# Patient Record
Sex: FEMALE | Race: WHITE | NOT HISPANIC OR LATINO | Employment: UNEMPLOYED | URBAN - METROPOLITAN AREA
[De-identification: names, ages, dates, MRNs, and addresses within clinical notes are randomized per-mention and may not be internally consistent; named-entity substitution may affect disease eponyms.]

---

## 2022-08-21 ENCOUNTER — ANESTHESIA EVENT (INPATIENT)
Dept: PERIOP | Facility: HOSPITAL | Age: 62
End: 2022-08-21
Payer: COMMERCIAL

## 2022-08-21 ENCOUNTER — APPOINTMENT (OUTPATIENT)
Dept: RADIOLOGY | Facility: HOSPITAL | Age: 62
End: 2022-08-21
Payer: COMMERCIAL

## 2022-08-21 ENCOUNTER — ANESTHESIA (INPATIENT)
Dept: PERIOP | Facility: HOSPITAL | Age: 62
End: 2022-08-21
Payer: COMMERCIAL

## 2022-08-21 ENCOUNTER — APPOINTMENT (EMERGENCY)
Dept: CT IMAGING | Facility: HOSPITAL | Age: 62
End: 2022-08-21
Payer: COMMERCIAL

## 2022-08-21 ENCOUNTER — HOSPITAL ENCOUNTER (INPATIENT)
Facility: HOSPITAL | Age: 62
LOS: 9 days | Discharge: HOME HEALTH | End: 2022-08-30
Attending: SURGERY | Admitting: SURGERY
Payer: COMMERCIAL

## 2022-08-21 ENCOUNTER — HOSPITAL ENCOUNTER (EMERGENCY)
Facility: HOSPITAL | Age: 62
End: 2022-08-21
Attending: EMERGENCY MEDICINE
Payer: COMMERCIAL

## 2022-08-21 VITALS
RESPIRATION RATE: 17 BRPM | OXYGEN SATURATION: 96 % | SYSTOLIC BLOOD PRESSURE: 107 MMHG | WEIGHT: 116.84 LBS | BODY MASS INDEX: 22.06 KG/M2 | HEIGHT: 61 IN | HEART RATE: 66 BPM | DIASTOLIC BLOOD PRESSURE: 56 MMHG | TEMPERATURE: 97.9 F

## 2022-08-21 DIAGNOSIS — S12.200A C3 CERVICAL FRACTURE (HCC): ICD-10-CM

## 2022-08-21 DIAGNOSIS — S02.2XXA NASAL BONE FRACTURE: ICD-10-CM

## 2022-08-21 DIAGNOSIS — J43.9 PULMONARY EMPHYSEMA, UNSPECIFIED EMPHYSEMA TYPE (HCC): ICD-10-CM

## 2022-08-21 DIAGNOSIS — S02.2XXA NASAL BONE FRACTURES: ICD-10-CM

## 2022-08-21 DIAGNOSIS — D64.9 ANEMIA: ICD-10-CM

## 2022-08-21 DIAGNOSIS — S12.100A C2 CERVICAL FRACTURE (HCC): Primary | ICD-10-CM

## 2022-08-21 DIAGNOSIS — S12.291D OTHER CLOSED NONDISPLACED FRACTURE OF THIRD CERVICAL VERTEBRA WITH ROUTINE HEALING, SUBSEQUENT ENCOUNTER: ICD-10-CM

## 2022-08-21 DIAGNOSIS — S12.101A CLOSED NONDISPLACED FRACTURE OF SECOND CERVICAL VERTEBRA, UNSPECIFIED FRACTURE MORPHOLOGY, INITIAL ENCOUNTER (HCC): Primary | ICD-10-CM

## 2022-08-21 DIAGNOSIS — S01.01XA SCALP LACERATION, INITIAL ENCOUNTER: ICD-10-CM

## 2022-08-21 DIAGNOSIS — S09.90XA INJURY OF HEAD, INITIAL ENCOUNTER: ICD-10-CM

## 2022-08-21 PROBLEM — G20 PARKINSON DISEASE (HCC): Status: ACTIVE | Noted: 2022-01-01

## 2022-08-21 PROBLEM — I82.409 DVT (DEEP VENOUS THROMBOSIS) (HCC): Status: ACTIVE | Noted: 2022-01-01

## 2022-08-21 PROBLEM — S15.102A: Status: ACTIVE | Noted: 2022-08-21
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
## 2022-08-21 LAB
ABO GROUP BLD: NORMAL
ABO GROUP BLD: NORMAL
ALBUMIN SERPL BCP-MCNC: 2.4 G/DL (ref 3.5–5)
ALP SERPL-CCNC: 108 U/L (ref 46–116)
ALT SERPL W P-5'-P-CCNC: 15 U/L (ref 12–78)
ANION GAP SERPL CALCULATED.3IONS-SCNC: 5 MMOL/L (ref 4–13)
APTT PPP: 33 SECONDS (ref 23–37)
AST SERPL W P-5'-P-CCNC: 51 U/L (ref 5–45)
BASOPHILS # BLD AUTO: 0.05 THOUSANDS/ΜL (ref 0–0.1)
BASOPHILS NFR BLD AUTO: 1 % (ref 0–1)
BILIRUB SERPL-MCNC: 0.56 MG/DL (ref 0.2–1)
BLD GP AB SCN SERPL QL: NEGATIVE
BUN SERPL-MCNC: 20 MG/DL (ref 5–25)
CA-I BLD-SCNC: 1.14 MMOL/L (ref 1.12–1.32)
CALCIUM ALBUM COR SERPL-MCNC: 8.7 MG/DL (ref 8.3–10.1)
CALCIUM SERPL-MCNC: 7.4 MG/DL (ref 8.3–10.1)
CHLORIDE SERPL-SCNC: 105 MMOL/L (ref 96–108)
CO2 SERPL-SCNC: 29 MMOL/L (ref 21–32)
CREAT SERPL-MCNC: 0.84 MG/DL (ref 0.6–1.3)
EOSINOPHIL # BLD AUTO: 0.13 THOUSAND/ΜL (ref 0–0.61)
EOSINOPHIL NFR BLD AUTO: 1 % (ref 0–6)
ERYTHROCYTE [DISTWIDTH] IN BLOOD BY AUTOMATED COUNT: 21.2 % (ref 11.6–15.1)
GFR SERPL CREATININE-BSD FRML MDRD: 74 ML/MIN/1.73SQ M
GLUCOSE SERPL-MCNC: 121 MG/DL (ref 65–140)
HCT VFR BLD AUTO: 24.2 % (ref 34.8–46.1)
HCT VFR BLD AUTO: 25.5 % (ref 34.8–46.1)
HCT VFR BLD AUTO: 28.8 % (ref 34.8–46.1)
HGB BLD-MCNC: 7.1 G/DL (ref 11.5–15.4)
HGB BLD-MCNC: 7.7 G/DL (ref 11.5–15.4)
HGB BLD-MCNC: 8.8 G/DL (ref 11.5–15.4)
IMM GRANULOCYTES # BLD AUTO: 0.02 THOUSAND/UL (ref 0–0.2)
IMM GRANULOCYTES NFR BLD AUTO: 0 % (ref 0–2)
INR PPP: 1.36 (ref 0.84–1.19)
LYMPHOCYTES # BLD AUTO: 2.14 THOUSANDS/ΜL (ref 0.6–4.47)
LYMPHOCYTES NFR BLD AUTO: 23 % (ref 14–44)
MAGNESIUM SERPL-MCNC: 1.7 MG/DL (ref 1.6–2.6)
MCH RBC QN AUTO: 28 PG (ref 26.8–34.3)
MCHC RBC AUTO-ENTMCNC: 30.2 G/DL (ref 31.4–37.4)
MCV RBC AUTO: 93 FL (ref 82–98)
MONOCYTES # BLD AUTO: 0.9 THOUSAND/ΜL (ref 0.17–1.22)
MONOCYTES NFR BLD AUTO: 10 % (ref 4–12)
NEUTROPHILS # BLD AUTO: 6.12 THOUSANDS/ΜL (ref 1.85–7.62)
NEUTS SEG NFR BLD AUTO: 65 % (ref 43–75)
NRBC BLD AUTO-RTO: 0 /100 WBCS
PHOSPHATE SERPL-MCNC: 4.2 MG/DL (ref 2.3–4.1)
PLATELET # BLD AUTO: 169 THOUSANDS/UL (ref 149–390)
PMV BLD AUTO: 11.5 FL (ref 8.9–12.7)
POTASSIUM SERPL-SCNC: 4.4 MMOL/L (ref 3.5–5.3)
PROT SERPL-MCNC: 5.2 G/DL (ref 6.4–8.4)
PROTHROMBIN TIME: 16.9 SECONDS (ref 11.6–14.5)
RBC # BLD AUTO: 2.75 MILLION/UL (ref 3.81–5.12)
RH BLD: NEGATIVE
RH BLD: NEGATIVE
SODIUM SERPL-SCNC: 139 MMOL/L (ref 135–147)
SPECIMEN EXPIRATION DATE: NORMAL
WBC # BLD AUTO: 9.36 THOUSAND/UL (ref 4.31–10.16)

## 2022-08-21 PROCEDURE — 99253 IP/OBS CNSLTJ NEW/EST LOW 45: CPT | Performed by: NEUROLOGICAL SURGERY

## 2022-08-21 PROCEDURE — 85014 HEMATOCRIT: CPT | Performed by: PHYSICIAN ASSISTANT

## 2022-08-21 PROCEDURE — 85025 COMPLETE CBC W/AUTO DIFF WBC: CPT | Performed by: EMERGENCY MEDICINE

## 2022-08-21 PROCEDURE — 99223 1ST HOSP IP/OBS HIGH 75: CPT | Performed by: EMERGENCY MEDICINE

## 2022-08-21 PROCEDURE — 85610 PROTHROMBIN TIME: CPT | Performed by: EMERGENCY MEDICINE

## 2022-08-21 PROCEDURE — 86920 COMPATIBILITY TEST SPIN: CPT

## 2022-08-21 PROCEDURE — 90471 IMMUNIZATION ADMIN: CPT

## 2022-08-21 PROCEDURE — 99291 CRITICAL CARE FIRST HOUR: CPT | Performed by: EMERGENCY MEDICINE

## 2022-08-21 PROCEDURE — 86850 RBC ANTIBODY SCREEN: CPT | Performed by: STUDENT IN AN ORGANIZED HEALTH CARE EDUCATION/TRAINING PROGRAM

## 2022-08-21 PROCEDURE — 96375 TX/PRO/DX INJ NEW DRUG ADDON: CPT

## 2022-08-21 PROCEDURE — 85018 HEMOGLOBIN: CPT | Performed by: PHYSICIAN ASSISTANT

## 2022-08-21 PROCEDURE — 99222 1ST HOSP IP/OBS MODERATE 55: CPT | Performed by: SURGERY

## 2022-08-21 PROCEDURE — 30233N1 TRANSFUSION OF NONAUTOLOGOUS RED BLOOD CELLS INTO PERIPHERAL VEIN, PERCUTANEOUS APPROACH: ICD-10-PCS | Performed by: SURGERY

## 2022-08-21 PROCEDURE — 93005 ELECTROCARDIOGRAM TRACING: CPT

## 2022-08-21 PROCEDURE — 94668 MNPJ CHEST WALL SBSQ: CPT

## 2022-08-21 PROCEDURE — 99285 EMERGENCY DEPT VISIT HI MDM: CPT

## 2022-08-21 PROCEDURE — 94664 DEMO&/EVAL PT USE INHALER: CPT

## 2022-08-21 PROCEDURE — P9040 RBC LEUKOREDUCED IRRADIATED: HCPCS

## 2022-08-21 PROCEDURE — NC001 PR NO CHARGE: Performed by: PHYSICIAN ASSISTANT

## 2022-08-21 PROCEDURE — 71045 X-RAY EXAM CHEST 1 VIEW: CPT

## 2022-08-21 PROCEDURE — 70450 CT HEAD/BRAIN W/O DYE: CPT

## 2022-08-21 PROCEDURE — 90715 TDAP VACCINE 7 YRS/> IM: CPT | Performed by: EMERGENCY MEDICINE

## 2022-08-21 PROCEDURE — 86900 BLOOD TYPING SEROLOGIC ABO: CPT | Performed by: STUDENT IN AN ORGANIZED HEALTH CARE EDUCATION/TRAINING PROGRAM

## 2022-08-21 PROCEDURE — G1004 CDSM NDSC: HCPCS

## 2022-08-21 PROCEDURE — 94760 N-INVAS EAR/PLS OXIMETRY 1: CPT

## 2022-08-21 PROCEDURE — 36415 COLL VENOUS BLD VENIPUNCTURE: CPT | Performed by: STUDENT IN AN ORGANIZED HEALTH CARE EDUCATION/TRAINING PROGRAM

## 2022-08-21 PROCEDURE — 86901 BLOOD TYPING SEROLOGIC RH(D): CPT | Performed by: STUDENT IN AN ORGANIZED HEALTH CARE EDUCATION/TRAINING PROGRAM

## 2022-08-21 PROCEDURE — 72125 CT NECK SPINE W/O DYE: CPT

## 2022-08-21 PROCEDURE — 96376 TX/PRO/DX INJ SAME DRUG ADON: CPT

## 2022-08-21 PROCEDURE — 85730 THROMBOPLASTIN TIME PARTIAL: CPT | Performed by: EMERGENCY MEDICINE

## 2022-08-21 PROCEDURE — 80053 COMPREHEN METABOLIC PANEL: CPT | Performed by: EMERGENCY MEDICINE

## 2022-08-21 PROCEDURE — 83735 ASSAY OF MAGNESIUM: CPT | Performed by: STUDENT IN AN ORGANIZED HEALTH CARE EDUCATION/TRAINING PROGRAM

## 2022-08-21 PROCEDURE — 84100 ASSAY OF PHOSPHORUS: CPT | Performed by: STUDENT IN AN ORGANIZED HEALTH CARE EDUCATION/TRAINING PROGRAM

## 2022-08-21 PROCEDURE — 70486 CT MAXILLOFACIAL W/O DYE: CPT

## 2022-08-21 PROCEDURE — 94640 AIRWAY INHALATION TREATMENT: CPT

## 2022-08-21 PROCEDURE — 82330 ASSAY OF CALCIUM: CPT | Performed by: STUDENT IN AN ORGANIZED HEALTH CARE EDUCATION/TRAINING PROGRAM

## 2022-08-21 PROCEDURE — 96374 THER/PROPH/DIAG INJ IV PUSH: CPT

## 2022-08-21 PROCEDURE — 70498 CT ANGIOGRAPHY NECK: CPT

## 2022-08-21 PROCEDURE — 72040 X-RAY EXAM NECK SPINE 2-3 VW: CPT

## 2022-08-21 PROCEDURE — 12004 RPR S/N/AX/GEN/TRK7.6-12.5CM: CPT | Performed by: EMERGENCY MEDICINE

## 2022-08-21 RX ORDER — NICOTINE 21 MG/24HR
14 PATCH, TRANSDERMAL 24 HOURS TRANSDERMAL DAILY
Status: DISCONTINUED | OUTPATIENT
Start: 2022-08-21 | End: 2022-08-30 | Stop reason: HOSPADM

## 2022-08-21 RX ORDER — FENTANYL CITRATE 50 UG/ML
50 INJECTION, SOLUTION INTRAMUSCULAR; INTRAVENOUS ONCE
Status: COMPLETED | OUTPATIENT
Start: 2022-08-21 | End: 2022-08-21

## 2022-08-21 RX ORDER — ACETYLCYSTEINE 200 MG/ML
3 SOLUTION ORAL; RESPIRATORY (INHALATION)
Status: DISCONTINUED | OUTPATIENT
Start: 2022-08-21 | End: 2022-08-23

## 2022-08-21 RX ORDER — ENTACAPONE 200 MG/1
200 TABLET ORAL 4 TIMES DAILY
Status: DISCONTINUED | OUTPATIENT
Start: 2022-08-21 | End: 2022-08-21 | Stop reason: SDUPTHER

## 2022-08-21 RX ORDER — AMOXICILLIN 250 MG
2 CAPSULE ORAL 2 TIMES DAILY
Status: DISCONTINUED | OUTPATIENT
Start: 2022-08-21 | End: 2022-08-30 | Stop reason: HOSPADM

## 2022-08-21 RX ORDER — MORPHINE SULFATE 4 MG/ML
4 INJECTION, SOLUTION INTRAMUSCULAR; INTRAVENOUS ONCE
Status: COMPLETED | OUTPATIENT
Start: 2022-08-21 | End: 2022-08-21

## 2022-08-21 RX ORDER — OXYCODONE HYDROCHLORIDE 5 MG/1
5 TABLET ORAL EVERY 4 HOURS PRN
Status: DISCONTINUED | OUTPATIENT
Start: 2022-08-21 | End: 2022-08-30 | Stop reason: HOSPADM

## 2022-08-21 RX ORDER — PAROXETINE HYDROCHLORIDE 20 MG/1
20 TABLET, FILM COATED ORAL DAILY
Status: DISCONTINUED | OUTPATIENT
Start: 2022-08-21 | End: 2022-08-30 | Stop reason: HOSPADM

## 2022-08-21 RX ORDER — SODIUM CHLORIDE, SODIUM GLUCONATE, SODIUM ACETATE, POTASSIUM CHLORIDE, MAGNESIUM CHLORIDE, SODIUM PHOSPHATE, DIBASIC, AND POTASSIUM PHOSPHATE .53; .5; .37; .037; .03; .012; .00082 G/100ML; G/100ML; G/100ML; G/100ML; G/100ML; G/100ML; G/100ML
500 INJECTION, SOLUTION INTRAVENOUS ONCE
Status: COMPLETED | OUTPATIENT
Start: 2022-08-21 | End: 2022-08-21

## 2022-08-21 RX ORDER — PAROXETINE HYDROCHLORIDE 20 MG/1
20 TABLET, FILM COATED ORAL DAILY
COMMUNITY

## 2022-08-21 RX ORDER — LEVALBUTEROL 1.25 MG/.5ML
1.25 SOLUTION, CONCENTRATE RESPIRATORY (INHALATION)
Status: DISCONTINUED | OUTPATIENT
Start: 2022-08-21 | End: 2022-08-23

## 2022-08-21 RX ORDER — CARBIDOPA, LEVODOPA AND ENTACAPONE 50; 200; 200 MG/1; MG/1; MG/1
1 TABLET, FILM COATED ORAL 4 TIMES DAILY
COMMUNITY

## 2022-08-21 RX ORDER — ACETAMINOPHEN 325 MG/1
975 TABLET ORAL EVERY 8 HOURS SCHEDULED
Status: DISCONTINUED | OUTPATIENT
Start: 2022-08-21 | End: 2022-08-30 | Stop reason: HOSPADM

## 2022-08-21 RX ORDER — ASPIRIN 81 MG/1
81 TABLET ORAL DAILY
Status: DISCONTINUED | OUTPATIENT
Start: 2022-08-21 | End: 2022-08-30 | Stop reason: HOSPADM

## 2022-08-21 RX ORDER — HEPARIN SODIUM 5000 [USP'U]/ML
5000 INJECTION, SOLUTION INTRAVENOUS; SUBCUTANEOUS EVERY 8 HOURS SCHEDULED
Status: DISCONTINUED | OUTPATIENT
Start: 2022-08-21 | End: 2022-08-22

## 2022-08-21 RX ORDER — ENTACAPONE 200 MG/1
200 TABLET ORAL 4 TIMES DAILY
Status: DISCONTINUED | OUTPATIENT
Start: 2022-08-21 | End: 2022-08-30 | Stop reason: HOSPADM

## 2022-08-21 RX ORDER — ALBUMIN, HUMAN INJ 5% 5 %
25 SOLUTION INTRAVENOUS ONCE
Status: COMPLETED | OUTPATIENT
Start: 2022-08-21 | End: 2022-08-21

## 2022-08-21 RX ORDER — ENOXAPARIN SODIUM 100 MG/ML
30 INJECTION SUBCUTANEOUS EVERY 12 HOURS
Status: DISCONTINUED | OUTPATIENT
Start: 2022-08-21 | End: 2022-08-21

## 2022-08-21 RX ORDER — LIDOCAINE 50 MG/G
1 PATCH TOPICAL DAILY
Status: DISCONTINUED | OUTPATIENT
Start: 2022-08-21 | End: 2022-08-30 | Stop reason: HOSPADM

## 2022-08-21 RX ORDER — CHLORHEXIDINE GLUCONATE 0.12 MG/ML
15 RINSE ORAL EVERY 12 HOURS SCHEDULED
Status: DISCONTINUED | OUTPATIENT
Start: 2022-08-21 | End: 2022-08-21

## 2022-08-21 RX ORDER — CARBIDOPA AND LEVODOPA 25; 100 MG/1; MG/1
2 TABLET, EXTENDED RELEASE ORAL 4 TIMES DAILY
Status: DISCONTINUED | OUTPATIENT
Start: 2022-08-21 | End: 2022-08-21 | Stop reason: SDUPTHER

## 2022-08-21 RX ORDER — PROPRANOLOL HYDROCHLORIDE 80 MG/1
160 CAPSULE, EXTENDED RELEASE ORAL DAILY
Status: DISCONTINUED | OUTPATIENT
Start: 2022-08-22 | End: 2022-08-30 | Stop reason: HOSPADM

## 2022-08-21 RX ORDER — GABAPENTIN 300 MG/1
300 CAPSULE ORAL 3 TIMES DAILY
COMMUNITY

## 2022-08-21 RX ORDER — MAGNESIUM SULFATE HEPTAHYDRATE 40 MG/ML
2 INJECTION, SOLUTION INTRAVENOUS ONCE
Status: COMPLETED | OUTPATIENT
Start: 2022-08-21 | End: 2022-08-21

## 2022-08-21 RX ORDER — GABAPENTIN 300 MG/1
300 CAPSULE ORAL 3 TIMES DAILY
Status: DISCONTINUED | OUTPATIENT
Start: 2022-08-21 | End: 2022-08-30 | Stop reason: HOSPADM

## 2022-08-21 RX ORDER — ACETYLCYSTEINE 200 MG/ML
3 SOLUTION ORAL; RESPIRATORY (INHALATION)
Status: DISCONTINUED | OUTPATIENT
Start: 2022-08-21 | End: 2022-08-21

## 2022-08-21 RX ORDER — ASPIRIN 81 MG/1
81 TABLET, CHEWABLE ORAL DAILY
COMMUNITY

## 2022-08-21 RX ORDER — HYDROMORPHONE HCL IN WATER/PF 6 MG/30 ML
0.2 PATIENT CONTROLLED ANALGESIA SYRINGE INTRAVENOUS EVERY 4 HOURS PRN
Status: DISCONTINUED | OUTPATIENT
Start: 2022-08-21 | End: 2022-08-26

## 2022-08-21 RX ORDER — OXYCODONE HYDROCHLORIDE 5 MG/1
2.5 TABLET ORAL EVERY 4 HOURS PRN
Status: DISCONTINUED | OUTPATIENT
Start: 2022-08-21 | End: 2022-08-30 | Stop reason: HOSPADM

## 2022-08-21 RX ADMIN — MORPHINE SULFATE 4 MG: 4 INJECTION INTRAVENOUS at 08:39

## 2022-08-21 RX ADMIN — GABAPENTIN 300 MG: 300 CAPSULE ORAL at 14:47

## 2022-08-21 RX ADMIN — TETANUS TOXOID, REDUCED DIPHTHERIA TOXOID AND ACELLULAR PERTUSSIS VACCINE, ADSORBED 0.5 ML: 5; 2.5; 8; 8; 2.5 SUSPENSION INTRAMUSCULAR at 03:22

## 2022-08-21 RX ADMIN — CARBIDOPA AND LEVODOPA 2 TABLET: 25; 100 TABLET ORAL at 18:21

## 2022-08-21 RX ADMIN — MORPHINE SULFATE 4 MG: 4 INJECTION INTRAVENOUS at 06:41

## 2022-08-21 RX ADMIN — ACETYLCYSTEINE 600 MG: 200 SOLUTION ORAL; RESPIRATORY (INHALATION) at 19:42

## 2022-08-21 RX ADMIN — IOHEXOL 64 ML: 350 INJECTION, SOLUTION INTRAVENOUS at 05:52

## 2022-08-21 RX ADMIN — FENTANYL CITRATE 50 MCG: 50 INJECTION INTRAMUSCULAR; INTRAVENOUS at 06:04

## 2022-08-21 RX ADMIN — ACETAMINOPHEN 975 MG: 325 TABLET ORAL at 13:58

## 2022-08-21 RX ADMIN — HEPARIN SODIUM 5000 UNITS: 5000 INJECTION INTRAVENOUS; SUBCUTANEOUS at 14:47

## 2022-08-21 RX ADMIN — HEPARIN SODIUM 5000 UNITS: 5000 INJECTION INTRAVENOUS; SUBCUTANEOUS at 21:53

## 2022-08-21 RX ADMIN — PAROXETINE HYDROCHLORIDE 20 MG: 20 TABLET, FILM COATED ORAL at 14:05

## 2022-08-21 RX ADMIN — ENTACAPONE 200 MG: 200 TABLET, FILM COATED ORAL at 21:55

## 2022-08-21 RX ADMIN — ENTACAPONE 200 MG: 200 TABLET, FILM COATED ORAL at 18:21

## 2022-08-21 RX ADMIN — NICOTINE 14 MG: 14 PATCH, EXTENDED RELEASE TRANSDERMAL at 13:58

## 2022-08-21 RX ADMIN — AMPICILLIN SODIUM AND SULBACTAM SODIUM 3 G: 2; 1 INJECTION, POWDER, FOR SOLUTION INTRAMUSCULAR; INTRAVENOUS at 14:58

## 2022-08-21 RX ADMIN — GABAPENTIN 300 MG: 300 CAPSULE ORAL at 21:53

## 2022-08-21 RX ADMIN — LIDOCAINE 5% 1 PATCH: 700 PATCH TOPICAL at 13:58

## 2022-08-21 RX ADMIN — ASPIRIN 81 MG: 81 TABLET, COATED ORAL at 14:47

## 2022-08-21 RX ADMIN — CARBIDOPA AND LEVODOPA 2 TABLET: 25; 100 TABLET ORAL at 21:55

## 2022-08-21 RX ADMIN — SENNOSIDES AND DOCUSATE SODIUM 2 TABLET: 50; 8.6 TABLET ORAL at 18:21

## 2022-08-21 RX ADMIN — ALBUMIN (HUMAN) 12.5 G: 12.5 INJECTION, SOLUTION INTRAVENOUS at 21:05

## 2022-08-21 RX ADMIN — CARBIDOPA AND LEVODOPA 2 TABLET: 25; 100 TABLET ORAL at 14:06

## 2022-08-21 RX ADMIN — ENTACAPONE 200 MG: 200 TABLET, FILM COATED ORAL at 14:06

## 2022-08-21 RX ADMIN — MAGNESIUM SULFATE HEPTAHYDRATE 2 G: 40 INJECTION, SOLUTION INTRAVENOUS at 18:18

## 2022-08-21 RX ADMIN — LEVALBUTEROL HYDROCHLORIDE 1.25 MG: 1.25 SOLUTION, CONCENTRATE RESPIRATORY (INHALATION) at 19:42

## 2022-08-21 RX ADMIN — AMPICILLIN SODIUM AND SULBACTAM SODIUM 3 G: 2; 1 INJECTION, POWDER, FOR SOLUTION INTRAMUSCULAR; INTRAVENOUS at 20:30

## 2022-08-21 RX ADMIN — SODIUM CHLORIDE, SODIUM GLUCONATE, SODIUM ACETATE, POTASSIUM CHLORIDE, MAGNESIUM CHLORIDE, SODIUM PHOSPHATE, DIBASIC, AND POTASSIUM PHOSPHATE 500 ML: .53; .5; .37; .037; .03; .012; .00082 INJECTION, SOLUTION INTRAVENOUS at 16:43

## 2022-08-21 RX ADMIN — FENTANYL CITRATE 50 MCG: 50 INJECTION INTRAMUSCULAR; INTRAVENOUS at 03:21

## 2022-08-21 RX ADMIN — ACETAMINOPHEN 975 MG: 325 TABLET ORAL at 21:53

## 2022-08-21 NOTE — ASSESSMENT & PLAN NOTE
8/21 CTA neck: No vascular enhancement of left vertebral artery in the neck  However, in this patient with no acute/persistent neurologic symptoms, the appearance is most suggestive of anatomic variation rather than acute traumatic left vertebral artery injury, specifically as there is no enhancement even of left vertebral artery origin  There is enhancement of left vertebral artery and left posterior cerebellar artery above the level of foramen magnum presumably via collateral circulation across vertebral basilar junction  There are patent posterior communicating arteries bilaterally and the basilar artery is somewhat diminutive consistent with anatomic variation       Neurosurgery consulted  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

## 2022-08-21 NOTE — H&P
H&P - Trauma   Dyan Lujan 57 y o  female MRN: [id number]  Unit/Bed#: ICU 13 Encounter: [id number]    Trauma Alert: Other transfer   Model of Arrival: Ambulance    Trauma Team: Attending Dr Mihir Beasley  Consultants:     Neurosurgery:   - STAT consult; returned call/text yes   ;     Assessment/Plan   Active Problems / Assessment:   C2,C3 fracture  Scalp laceration      Plan:   Admit SD 1   -neurosurgery consulted, Q1 hour neuro checks    History of Present Illness     Chief Complaint: fall  Mechanism:Fall     HPI:    Dyan Lujan is a 57 y o  female on eliquis who presented to [facility]  ED after experiencing and mechanical fall from standing height and striking her head on the shower  Pt was in the bathroom when she fell  She incurred a large scalp laceration that was stapled in the ED  She also was found to have C2 and C3 fractures and no neuro deficits  CTA showed vertebral artery fillin deficit vs anatomical variation  She was transferred to [facility] were she will be sent to [facility]  Review of Systems   Constitutional: Negative for diaphoresis and fever  HENT: Negative for dental problem, facial swelling, nosebleeds and rhinorrhea  Eyes: Negative for photophobia  Respiratory: Negative for cough, chest tightness and wheezing  Cardiovascular: Negative for chest pain  Gastrointestinal: Negative for anal bleeding, constipation and vomiting  Endocrine: Negative for polydipsia and polyuria  Genitourinary: Negative for dysuria, frequency, genital sores, hematuria and urgency  Musculoskeletal: Positive for neck pain  Negative for back pain and myalgias  Skin: Positive for wound  Neurological: Positive for tremors  Negative for syncope and headaches  Psychiatric/Behavioral: Negative for decreased concentration and hallucinations  All other systems reviewed and are negative  12-point, complete review of systems was reviewed and negative except as stated above       Historical Information     Past Medical History:   Diagnosis Date    Basedow's disease     Disease of thyroid gland     DVT (deep venous thrombosis) (HCC)     MGUS (monoclonal gammopathy of unknown significance)     Pulmonary emphysema (HCC)      No past surgical history on file  Immunization History   Administered Date(s) Administered    Tdap 08/21/2022     Last Tetanus: 8/21/2022  Family History: Non-contributory     Meds/Allergies   all current active meds have been reviewed   No Known Allergies    Objective   Initial Vitals:   Temperature: 97 5 °F (36 4 °C) (08/21/22 1000)  Pulse: 65 (08/21/22 1000)  Respirations: 22 (08/21/22 1000)  Blood Pressure: 100/53 (08/21/22 1000)    Primary Survey:   Airway:        Status: patent;        Pre-hospital Interventions: none        Hospital Interventions: none  Breathing:        Pre-hospital Interventions: none              Right breath sounds: normal       Left breath sounds: normal  Circulation:        Rhythm: regular       Rate: regular   Right Pulses Left Pulses    R radial: 2+  R femoral: 2+  R pedal: 2+     L radial: 2+  L femoral: 2+  L pedal: 2+       Disability:        GCS: Eye: 4; Verbal: 5 Motor: 6 Total: 15       Right Pupil: round;  reactive         Left Pupil:  round;  reactive      R Motor Strength L Motor Strength    R : 5/5  R dorsiflex: 5/5  R plantarflex: 5/5 L : 5/5  L dorsiflex: 5/5  L plantarflex: 5/5        Sensory:  No sensory deficit  Exposure:           Secondary Survey:  Physical Exam  Vitals reviewed  Constitutional:       General: She is not in acute distress  Appearance: She is not ill-appearing  HENT:      Head: Normocephalic  Comments: 12 cm stapled scalp laceration     Right Ear: External ear normal       Left Ear: External ear normal       Nose: Nose normal       Mouth/Throat:      Mouth: Mucous membranes are moist       Pharynx: No oropharyngeal exudate or posterior oropharyngeal erythema     Eyes:      General: Right eye: No discharge  Left eye: No discharge  Extraocular Movements: Extraocular movements intact  Pupils: Pupils are equal, round, and reactive to light  Neck:      Comments: c-collar  Cardiovascular:      Rate and Rhythm: Normal rate and regular rhythm  Pulses: Normal pulses  Pulmonary:      Effort: Pulmonary effort is normal       Breath sounds: Normal breath sounds  Abdominal:      General: Abdomen is flat  There is no distension  Tenderness: There is no abdominal tenderness  Musculoskeletal:         General: No swelling, tenderness or deformity  Normal range of motion  Skin:     General: Skin is warm  Neurological:      General: No focal deficit present  Mental Status: She is alert and oriented to person, place, and time  Cranial Nerves: No cranial nerve deficit  Comments: Tremor baseline   Psychiatric:         Mood and Affect: Mood normal          Invasive Devices  Report    Peripheral Intravenous Line  Duration           Peripheral IV 08/21/22 Left Antecubital <1 day    Peripheral IV 08/21/22 Left;Proximal;Ventral (anterior) Forearm <1 day              Lab Results:   BMP/CMP:   Lab Results   Component Value Date    SODIUM 139 08/21/2022    K 4 4 08/21/2022     08/21/2022    CO2 29 08/21/2022    BUN 20 08/21/2022    CREATININE 0 84 08/21/2022    CALCIUM 7 4 (L) 08/21/2022    AST 51 (H) 08/21/2022    ALT 15 08/21/2022    ALKPHOS 108 08/21/2022    EGFR 74 08/21/2022       Imaging Results: I have personally reviewed pertinent reports      Chest Xray(s): pending   FAST exam(s): negative for acute findings   CT Scan(s): positive for acute findings: see results   Additional Xray(s): N/A     Other Studies:     Code Status: Level 1 - Full Code  Advance Directive and Living Will:      Power of :    POLST:    I have spent 30 minutes with Patient  today in which greater than 50% of this time was spent in counseling/coordination of care regarding Impressions

## 2022-08-21 NOTE — ED PROVIDER NOTES
Emergency Department Trauma Note  Romario Silva 57 y o  female MRN: [id number]  Unit/Bed#: ED 07/ED 07 Encounter: [id number]      Trauma Alert: Trauma Acuity: Trauma Evaluation  Model of Arrival: Mode of Arrival: ALS via    Trauma Team: Current Providers  Attending Provider: Tiki Paul MD  Attending Provider: Kit Bower DO  Registered Nurse: Anton Ordaz RN  Registered Nurse: Oscar Chin RN  Consultants:     None      History of Present Illness     Chief Complaint:   Chief Complaint   Patient presents with   Meng Shack twice at home in the bathroom from standing position, struck head on a tablet and shower, no LOC, denies neck or back pain  Laceration to the top of the head and abrasion to the nose  No blood thinners  Bleeding controlled on arrival       HPI:  Romario Silva is a 57 y o  female who presents with fall in the bathroom, head injury, facial injury on Eliquis  Mechanism:Details of Incident: 2 falls at home from standing position, struck head on a tablet and shower          HPI  Review of Systems   Constitutional: Negative for chills, fatigue and fever  HENT: Negative for ear discharge, ear pain, rhinorrhea and sore throat  Eyes: Negative for pain and visual disturbance  Respiratory: Negative for cough and shortness of breath  Cardiovascular: Negative for chest pain and palpitations  Gastrointestinal: Negative for abdominal pain, diarrhea, nausea and vomiting  Endocrine: Negative for polydipsia, polyphagia and polyuria  Genitourinary: Negative for difficulty urinating, dysuria, flank pain and hematuria  Musculoskeletal: Negative for arthralgias and back pain  Skin: Positive for wound  Negative for color change and rash  Allergic/Immunologic: Negative for immunocompromised state  Neurological: Positive for headaches  Negative for dizziness, seizures, syncope and weakness  Psychiatric/Behavioral: Negative for confusion and self-injury   The patient is not nervous/anxious  All other systems reviewed and are negative  Historical Information     Immunizations:   Immunization History   Administered Date(s) Administered    Tdap 08/21/2022       Past Medical History:   Diagnosis Date    Basedow's disease     Disease of thyroid gland     DVT (deep venous thrombosis) (HCC)     MGUS (monoclonal gammopathy of unknown significance)     Pulmonary emphysema (HCC)      No family history on file  No past surgical history on file  Social History     Tobacco Use    Smoking status: Current Every Day Smoker     Packs/day: 1 00     Years: 45 00     Pack years: 45 00     Types: Cigarettes    Smokeless tobacco: Never Used   Substance Use Topics    Alcohol use: Never    Drug use: Never     E-Cigarette/Vaping     E-Cigarette/Vaping Substances       Family History: non-contributory    Meds/Allergies   Prior to Admission Medications   Prescriptions Last Dose Informant Patient Reported? Taking?    PARoxetine (PAXIL) 20 mg tablet   Yes Yes   Sig: Take 20 mg by mouth daily   apixaban (ELIQUIS) 5 mg   Yes Yes   Sig: Take 5 mg by mouth 2 (two) times a day   aspirin 81 mg chewable tablet   Yes Yes   Sig: Chew 81 mg daily   carbidopa-levodopa-entacapone (STALEVO) -200 MG per tablet   Yes Yes   Sig: Take 1 tablet by mouth 4 (four) times a day   gabapentin (NEURONTIN) 300 mg capsule   Yes Yes   Sig: Take 300 mg by mouth 3 (three) times a day   propranolol (INNOPRAN XL) 120 MG 24 hr capsule   Yes Yes   Sig: Take 160 mg by mouth daily at bedtime      Facility-Administered Medications: None       No Known Allergies    PHYSICAL EXAM    PE limited by:  Not limited    Objective   Vitals:   First set: Temperature: 97 9 °F (36 6 °C) (08/21/22 0310)  Pulse: 71 (08/21/22 0310)  Respirations: (!) 24 (08/21/22 0310)  Blood Pressure: 117/82 (08/21/22 0310)  SpO2: 95 % (2L NC) (08/21/22 0310)    Primary Survey:   (A) Airway:  Intact  (B) Breathing:  Clear to auscultation bilaterally  (C) Circulation: Pulses:   radial  4/4  (D) Disabliity:  GCS Total:  15  (E) Expose:  Completed    Secondary Survey: (Click on Physical Exam tab above)  Physical Exam  Vitals and nursing note reviewed  Constitutional:       General: She is not in acute distress  Appearance: Normal appearance  She is not ill-appearing, toxic-appearing or diaphoretic  HENT:      Head: Normocephalic  Comments: 11 cm laceration to the mid posterior frontal scalp     Right Ear: Tympanic membrane normal       Left Ear: Tympanic membrane normal       Nose: No congestion or rhinorrhea  Comments: Nasal swelling, ecchymosis to the nasal bridge, minor abrasions to the nasal bridge, no gross deformity     Mouth/Throat:      Mouth: Mucous membranes are moist       Pharynx: Oropharynx is clear  No oropharyngeal exudate or posterior oropharyngeal erythema  Eyes:      General:         Right eye: No discharge  Left eye: No discharge  Extraocular Movements: Extraocular movements intact  Conjunctiva/sclera: Conjunctivae normal       Pupils: Pupils are equal, round, and reactive to light  Cardiovascular:      Rate and Rhythm: Normal rate and regular rhythm  Pulses: Normal pulses  Heart sounds: Normal heart sounds  No murmur heard  No gallop  Pulmonary:      Effort: Pulmonary effort is normal  No respiratory distress  Breath sounds: No stridor  Wheezing present  No rhonchi or rales  Comments: Diffuse bilateral wheezes  Chest:      Chest wall: No tenderness  Abdominal:      General: Bowel sounds are normal  There is no distension  Palpations: Abdomen is soft  There is no mass  Tenderness: There is no abdominal tenderness  There is no right CVA tenderness, left CVA tenderness, guarding or rebound  Hernia: No hernia is present  Musculoskeletal:         General: Normal range of motion  Cervical back: Normal range of motion and neck supple     Skin: General: Skin is warm and dry  Capillary Refill: Capillary refill takes less than 2 seconds  Neurological:      General: No focal deficit present  Mental Status: She is alert and oriented to person, place, and time  Cranial Nerves: No cranial nerve deficit  Sensory: No sensory deficit  Motor: No weakness  Coordination: Coordination normal       Gait: Gait normal       Deep Tendon Reflexes: Reflexes normal       Comments: Intention tremor   Psychiatric:         Mood and Affect: Mood normal          Behavior: Behavior normal          Thought Content: Thought content normal          Judgment: Judgment normal          Cervical spine cleared by clinical criteria?  Yes     Invasive Devices  Report    None                 Lab Results:   Results Reviewed     Procedure Component Value Units Date/Time    Protime-INR [[id number]]  (Abnormal) Collected: 08/21/22 0316    Lab Status: Final result Specimen: Blood from Line, Venous Updated: 08/21/22 0346     Protime 16 9 seconds      INR 1 36    APTT [[id number]]  (Normal) Collected: 08/21/22 0316    Lab Status: Final result Specimen: Blood from Line, Venous Updated: 08/21/22 0346     PTT 33 seconds     Comprehensive metabolic panel [[id number]]  (Abnormal) Collected: 08/21/22 0316    Lab Status: Final result Specimen: Blood from Line, Venous Updated: 08/21/22 0343     Sodium 139 mmol/L      Potassium 4 4 mmol/L      Chloride 105 mmol/L      CO2 29 mmol/L      ANION GAP 5 mmol/L      BUN 20 mg/dL      Creatinine 0 84 mg/dL      Glucose 121 mg/dL      Calcium 7 4 mg/dL      Corrected Calcium 8 7 mg/dL      AST 51 U/L      ALT 15 U/L      Alkaline Phosphatase 108 U/L      Total Protein 5 2 g/dL      Albumin 2 4 g/dL      Total Bilirubin 0 56 mg/dL      eGFR 74 ml/min/1 73sq m     Narrative:      [place] guidelines for Chronic Kidney Disease (CKD):     Stage 1 with normal or high GFR (GFR > 90 mL/min/1 73 square meters)    Stage 2 Mild CKD (GFR = 60-89 mL/min/1 73 square meters)    Stage 3A Moderate CKD (GFR = 45-59 mL/min/1 73 square meters)    Stage 3B Moderate CKD (GFR = 30-44 mL/min/1 73 square meters)    Stage 4 Severe CKD (GFR = 15-29 mL/min/1 73 square meters)    Stage 5 End Stage CKD (GFR <15 mL/min/1 73 square meters)  Note: GFR calculation is accurate only with a steady state creatinine    CBC and differential [[id number]]  (Abnormal) Collected: 08/21/22 0316    Lab Status: Final result Specimen: Blood from Line, Venous Updated: 08/21/22 0321     WBC 9 36 Thousand/uL      RBC 2 75 Million/uL      Hemoglobin 7 7 g/dL      Hematocrit 25 5 %      MCV 93 fL      MCH 28 0 pg      MCHC 30 2 g/dL      RDW 21 2 %      MPV 11 5 fL      Platelets 142 Thousands/uL      nRBC 0 /100 WBCs      Neutrophils Relative 65 %      Immat GRANS % 0 %      Lymphocytes Relative 23 %      Monocytes Relative 10 %      Eosinophils Relative 1 %      Basophils Relative 1 %      Neutrophils Absolute 6 12 Thousands/µL      Immature Grans Absolute 0 02 Thousand/uL      Lymphocytes Absolute 2 14 Thousands/µL      Monocytes Absolute 0 90 Thousand/µL      Eosinophils Absolute 0 13 Thousand/µL      Basophils Absolute 0 05 Thousands/µL                  Imaging Studies:   Direct to CT: Yes  CTA neck with and without contrast   Final Result by Faustino Honeycutt MD (08/21 0731)      No vascular enhancement of left vertebral artery in the neck  However, in this patient with no acute/persistent neurologic symptoms, the appearance is most suggestive of anatomic variation rather than acute traumatic left vertebral artery injury,    specifically as there is no enhancement even of left vertebral artery origin  There is enhancement of left vertebral artery and left posterior cerebellar artery above the level of foramen magnum presumably via collateral circulation across vertebral    basilar junction    There are patent posterior communicating arteries bilaterally and the basilar artery is somewhat diminutive consistent with anatomic variation  Continued neurologic monitoring is recommended to exclude the development of neurologic    symptoms  Atherosclerotic changes, more advanced stability expected for the patient's age but without flow-limiting atherosclerotic stenosis  Emphysematous changes in the visualized upper lung zones  Reidentification of cervical spine degenerative changes and no cervical spine fractures without malalignment  I personally discussed this study with Santiago Leal on 8/21/2022 at 6:10 AM                   Workstation performed: [id number]         CT facial bones wo contrast   Final Result by Leela Pang DO (08/21 5992)      Large posterior vertex scalp hematoma/laceration, no calvarial fracture or acute intracranial abnormality is seen  Other findings as above  CT CERVICAL SPINE - WITHOUT CONTRAST      INDICATION:   Head trauma, moderate-severe   Fall, head injury, face injury, on Eliquis  COMPARISON:  None  TECHNIQUE:  CT examination of the cervical spine was performed without intravenous contrast   Contiguous axial images were obtained  Sagittal and coronal reconstructions were performed  Radiation dose length product (DLP) for this visit:  76 310 744 mGy-cm  (accession [id number]), 314 mGy-cm  (accession [id number]), 326 mGy-cm  (accession [id number])  This examination, like all CT scans performed in the [facility], was performed    utilizing techniques to minimize radiation dose exposure, including the use of iterative reconstruction and automated exposure control  IMAGE QUALITY:  Diagnostic  FINDINGS:      ALIGNMENT:  Normal alignment of the cervical spine  No subluxation  VERTEBRAL BODIES:  Oblique fracture of the anteroinferior aspect of C2 (sagittal image 57, series 604)  Oblique fracture of the left transverse process of C3 (axial image 62, series 5)  Visualized bones otherwise appear intact  Anterior plate and    screw fusion of C4-C7  Hardware appears intact  DEGENERATIVE CHANGES:  Intervertebral disc space narrowing at C2/C3, C3/C4, and C7/T1 with anterior, marginal, and uncovertebral osteophytosis at these levels  Multilevel facet joint arthropathy  PREVERTEBRAL AND PARASPINAL SOFT TISSUES:  Grossly unremarkable      THORACIC INLET:  Old fracture of the left clavicle  Moderate to severe pulmonary emphysematous changes  Mild atherosclerosis  IMPRESSION:      Oblique fracture of the anteroinferior aspect of C2 (sagittal image 57, series 604)  Oblique fracture of the left transverse process of C3 (axial image 62, series 5)  Recommend CTA of the neck to exclude vascular injury on the left  Spinal alignment appears maintained  Degenerative changes as described  Old fracture of the left clavicle, pulmonary emphysematous changes, and other findings as above  CT FACIAL BONES WITHOUT INTRAVENOUS CONTRAST      INDICATION:   Head trauma, moderate-severe   Fall, head injury, face injury, on Eliquis  COMPARISON: None  TECHNIQUE:  Axial CT images were obtained through the facial bones with additional sagittal and coronal reconstructions  Radiation dose length product (DLP) for this visit:  76 310 744 mGy-cm  (accession [id number]), 314 mGy-cm  (accession [id number]), 326 mGy-cm  (accession [id number])  This examination, like all CT scans performed in the [facility], was performed    utilizing techniques to minimize radiation dose exposure, including the use of iterative reconstruction and automated exposure control  IMAGE QUALITY:  Diagnostic  FINDINGS:       FACIAL BONES:  Nasal bone fracture  Normal alignment of the temporomandibular joints  No suspicious appearing osseous lesion        ORBITS:  Orbital globes, optic nerves, and extraocular muscles appear symmetric and normal  There is no evidence of retrobulbar mass, abscess, or hematoma  SINUSES:  Opacification of several bilateral ethmoid air cells; otherwise grossly unremarkable  SOFT TISSUES:  Perinasal soft tissue swelling and a small amount of subcutaneous emphysema  IMPRESSION:      Nasal bone fracture with associated perinasal soft tissue swelling  The orbits appear intact  Other findings as above  I personally discussed this study with Kehinde Friends on 8/21/2022 at 4:32 AM                Workstation performed: [id number]         CT spine cervical without contrast   Final Result by Yovnai Curry DO (08/21 9737)      Large posterior vertex scalp hematoma/laceration, no calvarial fracture or acute intracranial abnormality is seen  Other findings as above  CT CERVICAL SPINE - WITHOUT CONTRAST      INDICATION:   Head trauma, moderate-severe   Fall, head injury, face injury, on Eliquis  COMPARISON:  None  TECHNIQUE:  CT examination of the cervical spine was performed without intravenous contrast   Contiguous axial images were obtained  Sagittal and coronal reconstructions were performed  Radiation dose length product (DLP) for this visit:  76 310 744 mGy-cm  (accession [id number]), 314 mGy-cm  (accession [id number]), 326 mGy-cm  (accession [id number])  This examination, like all CT scans performed in the [facility], was performed    utilizing techniques to minimize radiation dose exposure, including the use of iterative reconstruction and automated exposure control  IMAGE QUALITY:  Diagnostic  FINDINGS:      ALIGNMENT:  Normal alignment of the cervical spine  No subluxation  VERTEBRAL BODIES:  Oblique fracture of the anteroinferior aspect of C2 (sagittal image 57, series 604)  Oblique fracture of the left transverse process of C3 (axial image 62, series 5)  Visualized bones otherwise appear intact    Anterior plate and screw fusion of C4-C7  Hardware appears intact  DEGENERATIVE CHANGES:  Intervertebral disc space narrowing at C2/C3, C3/C4, and C7/T1 with anterior, marginal, and uncovertebral osteophytosis at these levels  Multilevel facet joint arthropathy  PREVERTEBRAL AND PARASPINAL SOFT TISSUES:  Grossly unremarkable      THORACIC INLET:  Old fracture of the left clavicle  Moderate to severe pulmonary emphysematous changes  Mild atherosclerosis  IMPRESSION:      Oblique fracture of the anteroinferior aspect of C2 (sagittal image 57, series 604)  Oblique fracture of the left transverse process of C3 (axial image 62, series 5)  Recommend CTA of the neck to exclude vascular injury on the left  Spinal alignment appears maintained  Degenerative changes as described  Old fracture of the left clavicle, pulmonary emphysematous changes, and other findings as above  CT FACIAL BONES WITHOUT INTRAVENOUS CONTRAST      INDICATION:   Head trauma, moderate-severe   Fall, head injury, face injury, on Eliquis  COMPARISON: None  TECHNIQUE:  Axial CT images were obtained through the facial bones with additional sagittal and coronal reconstructions  Radiation dose length product (DLP) for this visit:  76 310 744 mGy-cm  (accession [id number]), 314 mGy-cm  (accession [id number]), 326 mGy-cm  (accession [id number])  This examination, like all CT scans performed in the [facility], was performed    utilizing techniques to minimize radiation dose exposure, including the use of iterative reconstruction and automated exposure control  IMAGE QUALITY:  Diagnostic  FINDINGS:       FACIAL BONES:  Nasal bone fracture  Normal alignment of the temporomandibular joints  No suspicious appearing osseous lesion  ORBITS:  Orbital globes, optic nerves, and extraocular muscles appear symmetric and normal  There is no evidence of retrobulbar mass, abscess, or hematoma  SINUSES:  Opacification of several bilateral ethmoid air cells; otherwise grossly unremarkable  SOFT TISSUES:  Perinasal soft tissue swelling and a small amount of subcutaneous emphysema  IMPRESSION:      Nasal bone fracture with associated perinasal soft tissue swelling  The orbits appear intact  Other findings as above  I personally discussed this study with Brooke Hernandes on 8/21/2022 at 4:32 AM                Workstation performed: [id number]         CT head without contrast   Final Result by Saima Munguia DO (08/21 7421)      Large posterior vertex scalp hematoma/laceration, no calvarial fracture or acute intracranial abnormality is seen  Other findings as above  CT CERVICAL SPINE - WITHOUT CONTRAST      INDICATION:   Head trauma, moderate-severe   Fall, head injury, face injury, on Eliquis  COMPARISON:  None  TECHNIQUE:  CT examination of the cervical spine was performed without intravenous contrast   Contiguous axial images were obtained  Sagittal and coronal reconstructions were performed  Radiation dose length product (DLP) for this visit:  76 310 744 mGy-cm  (accession [id number]), 314 mGy-cm  (accession [id number]), 326 mGy-cm  (accession [id number])  This examination, like all CT scans performed in the [facility], was performed    utilizing techniques to minimize radiation dose exposure, including the use of iterative reconstruction and automated exposure control  IMAGE QUALITY:  Diagnostic  FINDINGS:      ALIGNMENT:  Normal alignment of the cervical spine  No subluxation  VERTEBRAL BODIES:  Oblique fracture of the anteroinferior aspect of C2 (sagittal image 57, series 604)  Oblique fracture of the left transverse process of C3 (axial image 62, series 5)  Visualized bones otherwise appear intact  Anterior plate and    screw fusion of C4-C7  Hardware appears intact        DEGENERATIVE CHANGES:  Intervertebral disc space narrowing at C2/C3, C3/C4, and C7/T1 with anterior, marginal, and uncovertebral osteophytosis at these levels  Multilevel facet joint arthropathy  PREVERTEBRAL AND PARASPINAL SOFT TISSUES:  Grossly unremarkable      THORACIC INLET:  Old fracture of the left clavicle  Moderate to severe pulmonary emphysematous changes  Mild atherosclerosis  IMPRESSION:      Oblique fracture of the anteroinferior aspect of C2 (sagittal image 57, series 604)  Oblique fracture of the left transverse process of C3 (axial image 62, series 5)  Recommend CTA of the neck to exclude vascular injury on the left  Spinal alignment appears maintained  Degenerative changes as described  Old fracture of the left clavicle, pulmonary emphysematous changes, and other findings as above  CT FACIAL BONES WITHOUT INTRAVENOUS CONTRAST      INDICATION:   Head trauma, moderate-severe   Fall, head injury, face injury, on Eliquis  COMPARISON: None  TECHNIQUE:  Axial CT images were obtained through the facial bones with additional sagittal and coronal reconstructions  Radiation dose length product (DLP) for this visit:  76 310 744 mGy-cm  (accession [id number]), 314 mGy-cm  (accession [id number]), 326 mGy-cm  (accession [id number])  This examination, like all CT scans performed in the [facility], was performed    utilizing techniques to minimize radiation dose exposure, including the use of iterative reconstruction and automated exposure control  IMAGE QUALITY:  Diagnostic  FINDINGS:       FACIAL BONES:  Nasal bone fracture  Normal alignment of the temporomandibular joints  No suspicious appearing osseous lesion  ORBITS:  Orbital globes, optic nerves, and extraocular muscles appear symmetric and normal  There is no evidence of retrobulbar mass, abscess, or hematoma        SINUSES:  Opacification of several bilateral ethmoid air cells; otherwise grossly unremarkable  SOFT TISSUES:  Perinasal soft tissue swelling and a small amount of subcutaneous emphysema  IMPRESSION:      Nasal bone fracture with associated perinasal soft tissue swelling  The orbits appear intact  Other findings as above  I personally discussed this study with Juan Conklinmarkie on 8/21/2022 at 4:32 AM                Workstation performed: [id number]               Procedures  Laceration repair    Date/Time: 8/21/2022 4:50 AM  Performed by: Penny Mann MD  Authorized by: Penny Mann MD   Consent: Verbal consent obtained  Risks and benefits: risks, benefits and alternatives were discussed  Consent given by: patient and spouse  Patient identity confirmed: verbally with patient, arm band, provided demographic data and hospital-assigned identification number  Time out: Immediately prior to procedure a "time out" was called to verify the correct patient, procedure, equipment, support staff and site/side marked as required  Body area: head/neck  Location details: scalp  Laceration length: 11 cm  Tendon involvement: none  Nerve involvement: none  Vascular damage: no  Anesthesia: local infiltration    Anesthesia:  Local Anesthetic: lidocaine 1% with epinephrine  Anesthetic total: 8 mL    Sedation:  Patient sedated: no      Wound Dehiscence:  Superficial Wound Dehiscence: simple closure      Procedure Details:  Preparation: Patient was prepped and draped in the usual sterile fashion  Irrigation solution: saline  Irrigation method: syringe  Amount of cleaning: extensive  Debridement: none  Degree of undermining: none  Skin closure: staples  Number of sutures: 23 staples    Approximation: close  Approximation difficulty: simple  Patient tolerance: patient tolerated the procedure well with no immediate complications    CriticalCare Time  Performed by: Penny Mann MD  Authorized by: Penny Mann MD     Critical care provider statement:     Critical care time (minutes):  45    Critical care was necessary to treat or prevent imminent or life-threatening deterioration of the following conditions:  Trauma    Critical care was time spent personally by me on the following activities:  Interpretation of cardiac output measurements, ordering and performing treatments and interventions, ordering and review of laboratory studies, ordering and review of radiographic studies, re-evaluation of patient's condition, review of old charts, examination of patient, evaluation of patient's response to treatment, discussions with consultants, development of treatment plan with patient or surrogate and obtaining history from patient or surrogate    I assumed direction of critical care for this patient from another provider in my specialty: no               ED Course  ED Course as of 08/21/22 1915   Sun Aug 21, 2022   [id number]:  Patient appears well, vital signs reviewed  Trauma eval called  Head injury/facial injury on Eliquis  Normal neurological exam   Plan to complete CT head, CT cervical spine, CT maxillofacial   No chest trauma, no chest complaints, chest x-ray for gone  Full range of motion of both hips, no pelvic pain, x-rays of the pelvis for gone  I will give analgesics for her discomfort, update her tetanus status, and prepare for primary repair of scalp laceration  0528 102:  CTs discussed with radiologist   Patient noted to have cervical spine fractures  Patient still without neck pain  I will consult Trauma for admission  Plan to repair wound, see procedure note for full details  [id number]:  Discussed with Dr Colby Vazquez trauma team, accepts for admission  Requesting CTA neck    [id number]: CTA neck d/w radiologist, 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  Neuro exam unchanged             MDM        Disposition  [facility] Transfer: No  Final diagnoses:   Injury of head, initial encounter   Scalp laceration, initial encounter   Nasal bone fractures   Anemia   C2 cervical fracture ([facility])   C3 cervical fracture ([facility] [address] )     Time reflects when diagnosis was documented in both MDM as applicable and the Disposition within this note     Time User Action Codes Description Comment    8/21/2022  4:09 AM Sandrea Stage Add [S09 90XA] Injury of head, initial encounter     8/21/2022  4:09 AM Sandrea Stage Add [S01 01XA] Scalp laceration, initial encounter     8/21/2022  4:09 AM Sandrea Stage Add [S02  2XXA] Nasal bone fractures     8/21/2022  4:09 AM Qing Shahab [D64 9] Anemia     8/21/2022  4:33 AM Sandrea Stage Add [Y61 923A] C2 cervical fracture ([facility] [address] )     8/21/2022  4:33 AM Sandrea Stage Modify [S09 90XA] Injury of head, initial encounter     8/21/2022  4:33 AM Sandrea Stage Modify [D10 302B] C2 cervical fracture ([facility] [address] )     8/21/2022  4:33 AM Sandrea Stage Add [S12 200A] C3 cervical fracture [facility])       ED Disposition     ED Disposition   Transfer to Another Facility-In Network    Condition   --    Date/Time   Sun Aug 21, 2022  4:33 AM    Comment              MD Documentation    Flowsheet Row Most Recent Value   Patient Condition The patient has been stabilized such that within reasonable medical probability, no material deterioration of the patient condition or the condition of the unborn child(maryam) is likely to result from the transfer   Reason for Transfer Level of Care needed not available at this facility   Benefits of Transfer Specialized equipment and/or services available at the receiving facility (Include comment)________________________   Risks of Transfer Potential for delay in receiving treatment, Possible worsening of condition or death during transfer, Loss of IV, Potential deterioration of medical condition, Increased discomfort during transfer   Accepting Physician Dr Darline Paris Name, Antonio Ferrell MD   Provider Certification General risk, such as traffic hazards, adverse weather conditions, rough terrain or turbulence, possible failure of equipment (including vehicle or aircraft), or consequences of actions of persons outside the control of the transport personnel, Unanticipated needs of medical equipment and personnel during transport, Risk of worsening condition, The possibility of a transport vehicle being unavailable      RN Documentation    Flowsheet Row Most [facility] Name, [place]  [facility]      Follow-up Information    None       Discharge Medication List as of 8/21/2022  8:57 AM      CONTINUE these medications which have NOT CHANGED    Details   apixaban (ELIQUIS) 5 mg Take 5 mg by mouth 2 (two) times a day, Historical Med      aspirin 81 mg chewable tablet Chew 81 mg daily, Historical Med      carbidopa-levodopa-entacapone (STALEVO) -200 MG per tablet Take 1 tablet by mouth 4 (four) times a day, Historical Med      gabapentin (NEURONTIN) 300 mg capsule Take 300 mg by mouth 3 (three) times a day, Historical Med      PARoxetine (PAXIL) 20 mg tablet Take 20 mg by mouth daily, Historical Med      propranolol (INNOPRAN XL) 120 MG 24 hr capsule Take 160 mg by mouth daily at bedtime, Historical Med           No discharge procedures on file      PDMP Review     None          ED Provider  Electronically Signed by         Vladimir Mckeon MD  08/21/22 3129

## 2022-08-21 NOTE — EMTALA/ACUTE CARE TRANSFER
[facility]  [address]  [facility] [address]  Dept: [phone]      EMTALA TRANSFER CONSENT    NAME Seema Ambriz                                         1960                              MRN [id number]    I have been informed of my rights regarding examination, treatment, and transfer   by Dr Kay Reynolds MD    Benefits: Specialized equipment and/or services available at the receiving facility (Include comment)________________________    Risks: Potential for delay in receiving treatment, Possible worsening of condition or death during transfer, Loss of IV, Potential deterioration of medical condition, Increased discomfort during transfer      Consent for Transfer:  I acknowledge that my medical condition has been evaluated and explained to me by the emergency department physician or other qualified medical person and/or my attending physician, who has recommended that I be transferred to the service of  Accepting Physician: Dr Mary Valdivia at [facility] Name, [address] : [facility]  The above potential benefits of such transfer, the potential risks associated with such transfer, and the probable risks of not being transferred have been explained to me, and I fully understand them  The doctor has explained that, in my case, the benefits of transfer outweigh the risks  I agree to be transferred  I authorize the performance of emergency medical procedures and treatments upon me in both transit and upon arrival at the receiving facility  Additionally, I authorize the release of any and all medical records to the receiving facility and request they be transported with me, if possible  I understand that the safest mode of transportation during a medical emergency is an ambulance and that the Hospital advocates the use of this mode of transport   Risks of traveling to the receiving facility by car, including absence of medical control, life sustaining equipment, such as oxygen, and medical personnel has been explained to me and I fully understand them  (PREMA CORRECT BOX BELOW)  [  ]  I consent to the stated transfer and to be transported by ambulance/helicopter  [  ]  I consent to the stated transfer, but refuse transportation by ambulance and accept full responsibility for my transportation by car  I understand the risks of non-ambulance transfers and I exonerate the Hospital and its staff from any deterioration in my condition that results from this refusal     X___________________________________________    DATE  22  TIME________  Signature of patient or legally responsible individual signing on patient behalf           RELATIONSHIP TO PATIENT_________________________          Provider Certification    NAME Jason Costello                                         1960                              MRN [id number]    A medical screening exam was performed on the above named patient  Based on the examination:    Condition Necessitating Transfer The primary encounter diagnosis was C2 cervical fracture ([address] )  Diagnoses of Injury of head, initial encounter, Scalp laceration, initial encounter, Nasal bone fractures, Anemia, and C3 cervical fracture ([address] ) were also pertinent to this visit      Patient Condition: The patient has been stabilized such that within reasonable medical probability, no material deterioration of the patient condition or the condition of the unborn child(maryam) is likely to result from the transfer    Reason for Transfer: Level of Care needed not available at this facility    Transfer Requirements: Facility [facility]   · Space available and qualified personnel available for treatment as acknowledged by    · Agreed to accept transfer and to provide appropriate medical treatment as acknowledged by       Dr Anton Turner  · Appropriate medical records of the examination and treatment of the patient are provided at the time of transfer   STAFF INITIAL WHEN COMPLETED _______  · Transfer will be performed by qualified personnel from    and appropriate transfer equipment as required, including the use of necessary and appropriate life support measures  Provider Certification: I have examined the patient and explained the following risks and benefits of being transferred/refusing transfer to the patient/family:  General risk, such as traffic hazards, adverse weather conditions, rough terrain or turbulence, possible failure of equipment (including vehicle or aircraft), or consequences of actions of persons outside the control of the transport personnel, Unanticipated needs of medical equipment and personnel during transport, Risk of worsening condition, The possibility of a transport vehicle being unavailable      Based on these reasonable risks and benefits to the patient and/or the unborn child(maryam), and based upon the information available at the time of the patients examination, I certify that the medical benefits reasonably to be expected from the provision of appropriate medical treatments at another medical facility outweigh the increasing risks, if any, to the individuals medical condition, and in the case of labor to the unborn child, from effecting the transfer      X____________________________________________ DATE 08/21/22        TIME_______      ORIGINAL - SEND TO MEDICAL RECORDS   COPY - SEND WITH PATIENT DURING TRANSFER

## 2022-08-21 NOTE — CONSULTS
Patient Name: Seun Kelley YOB: 1960    Medical Record No : [id number]     Admit/Registration Date: 8/21/2022  9:49 AM  Date of Consult: 08/21/22      Oral and Maxillofacial Surgery Consult Note    Assessment:  57 y o  female with PMH of MGUS, parkinson disease, emphysema, DVT, scalp laceration, left vertebral artery occlusion who is s/p alleged fall sustained bilateral nasal bones and C2, C3 cervical fractures  The patient is in C-collar presently  Plan/Recs:  - OR with [facility] for closed reduction of bilateral nasal bone fractures in OR setting pending clearance from Neurosurgery  - NPO  - Sinus precautions  - Unasyn 3 gm q6h or Augmentin 875/125 mg BID x 5-7 days  - Pain meds as per primary team  - [email or web address] if permissible  - Call [facility] with any questions, thank you  -----------------------------------------    Chief Complaint:  Facial trauma secondary to fall    HPI:  57 y o  female with PMH of MGUS, parkinson disease, emphysema, DVT, scalp laceration, left vertebral artery occlusion who is s/p alleged fall sustained bilateral nasal bones and C2, C3 cervical fractures  The patient is in C-collar presently  Pre-admission records reviewed  PMH/PSH/Meds/Allergies Reviewed  Past Medical History:   Diagnosis Date    Basedow's disease     Disease of thyroid gland     DVT (deep venous thrombosis) (HCC)     MGUS (monoclonal gammopathy of unknown significance)     Pulmonary emphysema (HCC)      No past surgical history on file      No Known Allergies    Social History     Socioeconomic History    Marital status: Unknown     Spouse name: Not on file    Number of children: Not on file    Years of education: Not on file    Highest education level: Not on file   Occupational History    Not on file   Tobacco Use    Smoking status: Not on file    Smokeless tobacco: Not on file   Substance and Sexual Activity    Alcohol use: Not on file    Drug use: Not on file    Sexual activity: Not on file   Other Topics Concern    Not on file   Social History Narrative    Not on file     Social Determinants of Health     Financial Resource Strain: Not on file   Food Insecurity: Not on file   Transportation Needs: Not on file   Physical Activity: Not on file   Stress: Not on file   Social Connections: Not on file   Intimate Partner Violence: Not on file   Housing Stability: Not on file       Scheduled Medications  Current Facility-Administered Medications   Medication Dose Route Frequency Provider Last Rate    acetaminophen  975 mg Oral [facility] Chilo Marcos [place], DO      HYDROmorphone  0 2 mg Intravenous Q4H PRN Chilo Marcos Cerni, DO      lidocaine  1 patch Topical Daily Felisa Hughes MD      nicotine  14 mg Transdermal Daily Felisa Hughes MD      oxyCODONE  2 5 mg Oral Q4H PRN Chilo Marcos Cerni, DO      oxyCODONE  5 mg Oral Q4H PRN Chilo Marcos Cerni, DO      senna-docusate sodium  2 tablet Oral BID Felisa Hughes MD         PRN Medications    HYDROmorphone    oxyCODONE    oxyCODONE    Medication Infusions       Review of Systems    Vitals:    Temp:  [97 5 °F (36 4 °C)-97 9 °F (36 6 °C)] 97 5 °F (36 4 °C)  HR:  [58-83] 58  Resp:  [15-26] 15  BP: ()/(50-82) 92/51  Wt Readings from Last 1 Encounters:   08/21/22 53 kg (116 lb 13 5 oz)     Ht Readings from Last 1 Encounters:   08/21/22 5' 1" (1 549 m)     There is no height or weight on file to calculate BMI  Respiratory  Report   Lab Data (Last 4 hours)    None         O2/Vent Data (Last 4 hours)    None              Patient Lines/Drains/Airways Status     Active Airway     None              I/O:  Current Diet Order:        Diet Orders   (From admission, onward)             Start     Ordered    08/21/22 1114  Diet NPO  Diet effective now        References:    Nutrtion Support Algorithm Enteral vs  Parenteral   Question Answer Comment   Diet Type NPO    RD to adjust diet per protocol?  Yes        08/21/22 1115               No intake or output data in the 24 hours ending 08/21/22 1254    Labs:  Results from last 7 days   Lab Units 08/21/22  0316   WBC Thousand/uL 9 36   HEMOGLOBIN g/dL 7 7*   HEMATOCRIT % 25 5*   PLATELETS Thousands/uL 169     Results from last 7 days   Lab Units 08/21/22  0316   POTASSIUM mmol/L 4 4   CHLORIDE mmol/L 105   CO2 mmol/L 29   BUN mg/dL 20   CREATININE mg/dL 0 84   CALCIUM mg/dL 7 4*     Results from last 7 days   Lab Units 08/21/22  0316   INR  1 36*   PTT seconds 33         Pain Management Panel    There is no flowsheet data to display  Imaging:   CT: Maxillofacial without contrast reviewed : Bilateral nasal bone fractures  Physical Exam:   General: Integment: skin warm and dry, patient is WD/WN, Voice quality: normal, in NAD  Neuro Exam: AAOX3, CN V,VII grossly intact, GCS: 15  Head: Normocephalic, no scalp lacerations or hematoma  Face: No lacerations/abrasions   Ears: Pinna wnl bilaterally, no otorrhea, hearing grossly intact,    Eyes/Periorbital: Pupils equal, round, reactive to light and Extraocular movements intact, intercanthal distance wnl   Nose: Edema and ecchymosis noted with nasal dorsum, +nasal crepitus, no nasal septal hematoma, no rhinorrhea, no epistaxis, bilateral nares patent   Oral Exam:Lips and mucosal surfaces wnl, floor of mouth is soft with no palpable masses, tongue protrusion is midline and has full range of motion, no pharyngeal edema or exudate   Dentalalveolar Exam: edentulous maxilla and mandible     Lymph/Neck Exam: Neck is soft, trachea is midline, no gross cervical lymphadenopathy bilaterally     Tayler Zavala, JAMIE

## 2022-08-21 NOTE — ASSESSMENT & PLAN NOTE
8/21 CT cspine: anterioinferior C2 fx, L transverse process fx C3, old L clavicle fx  Neurosurgery consulted  Appreciate recommendations  Cervical spine precautions  Cervical collar  Holding DVT ppx and AC/AP pending NSG recommendations  Neurovascular exams Q1hr  No focal deficits on arrival to [facility]

## 2022-08-21 NOTE — ASSESSMENT & PLAN NOTE
On Eliquis for DVT   Holding in setting of trauma   Hgb 7 7, INR 1 36, PTT 33  Follow up NSG recommendations regarding resumption of anticoagulation

## 2022-08-21 NOTE — CONSULTS
[facility] 1960, 57 y o  female MRN: [id number]  Unit/Bed#: ICU 13 Encounter: [id number]  Primary Care Provider: No primary care provider on file  Date and time admitted to hospital: 8/21/2022  9:49 AM    Consults    MGUS (monoclonal gammopathy of unknown significance)  Assessment & Plan  Per Epic review, MGUS w/ h/o recurrent DVTs on Eliquis/ASA  Holding Eliquis and ASA pending NSG evaluation  Outpatient follow up    Pulmonary emphysema ([address] )  Assessment & Plan  [facility] [place] on 3L supplemental O2 sating 100%  Not on home O2  Wean O2 as able  Pulmonary toilet  F/u cxr r/o rib fractures - no chest wall tenderness  Q1hr I/S use  1PPD smoker  Nicotine patch    DVT (deep venous thrombosis) ([facility])  Assessment & Plan  On Eliquis for DVT   Holding in setting of trauma   Hgb 7 7, INR 1 36, PTT 33  Follow up NSG recommendations regarding resumption of anticoagulation    Fall  Assessment & Plan  8/21 mechanical fall d/t ambulatory dysfunction and Parkinson's disease  No home assistive devices for ambulation  PT OT consulted  Injuries as below    CTH negative for acute injuries  C cspine w/C2 fx, and L transverse process C3 fx  CT facial bones w/nasal bone fx  CTA neck w/nonenhancement of L vertebral artery  Chest xray pending  FAST pending    Scalp laceration, initial encounter  Assessment & Plan  11cm scalp laceration after fall  S/p staple repair at OSH 8/21  Staple removal 10-14 days  [facility] and monitoring  Outpatient follow up trauma clinic    Unspecified injury of left vertebral artery, initial encounter  Assessment & Plan  8/21 CTA neck: No vascular enhancement of left vertebral artery in the neck    However, in this patient with no acute/persistent neurologic symptoms, the appearance is most suggestive of anatomic variation rather than acute traumatic left vertebral artery injury, specifically as there is no enhancement even of left vertebral artery origin  There is enhancement of left vertebral artery and left posterior cerebellar artery above the level of foramen magnum presumably via collateral circulation across vertebral basilar junction  There are patent posterior communicating arteries bilaterally and the basilar artery is somewhat diminutive consistent with anatomic variation       Neurosurgery consulted  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      Nasal fracture  Assessment & Plan  8/21 CT facial bones: nasal bone fracture  OMFS consulted  Appreciate recommendations    C3 cervical fracture (HCC)  Assessment & Plan  As per C2 cervical fracture plan:    8/21 CT cspine: anterioinferior C2 fx, L transverse process fx C3, old L clavicle fx  Neurosurgery consulted  Appreciate recommendations  Cervical spine precautions  Cervical collar  Holding DVT ppx and AC/AP pending NSG recommendations  Neurovascular exams Q1hr  No focal deficits on arrival to [facility]    Parkinson disease ([facility] [address] )  Assessment & Plan  Takes carbidopa-levodopa-entacapone (STALEVO) -200 MG per tablet  Formulary recommendation per pharmacy:     * C2 cervical fracture [facility])  Assessment & Plan  8/21 CT cspine: anterioinferior C2 fx, L transverse process fx C3, old L clavicle fx  Neurosurgery consulted  Appreciate recommendations  Cervical spine precautions  Cervical collar  Holding DVT ppx and AC/AP pending NSG recommendations  Neurovascular exams Q1hr  No focal deficits on arrival to [address] [place] [address] 57 y o  female MRN: [id number]  Unit/Bed#: ICU 13 Encounter: [id number]      -------------------------------------------------------------------------------------------------------------  Chief Complaint: headache    History of Present Illness     Kiki Clark is a 57 y o  female w/ PMHx C5-7 anterior discectomy and fusion - Dr Esa Lopez at [facility], H/o Thyrotoxicosis, Parkinsons, MGUS w/recurrent DVTs on ASA/Eliquis, COLALDO, HTN, smoker 1 PPD, Emphysema not on home O2, who presents as a trauma transfer for Neurosurgery evaluation of C2 and C3 fractures after mechanical fall  Patient experienced a ground level fall striking the back of her head  She denies symptoms suggestive of syncopal episode or seizure activity  No neurological deficits on exam and maintained in cervical collar  Additional injuries significant for nasal bone fracture      History obtained from spouse, chart review and the patient   -------------------------------------------------------------------------------------------------------------  Assessment and Plan:    Neuro:    Diagnosis: Analgesia  o PRN oxy, breakthrough dilaudid  o Tosha tylenol   o Lidocaine patch   Diagnosis: anterioinferior C2 fracture, C3 fracture transverse process  o S/p mechanical fall  o Cervical spine precautions  o Maintain c-collar  o Neurosurgery evaluation  o Q1hr neurovascular checks   Diagnosis: Parkinson's disease  o Medication reconciliation pending  o Per patient, her sister, Yusef Bryant, manages her home medications  o Awaiting call back for resumption of home medications      CV:    No acute issues      Pulm:   Diagnosis: Pulmonary emhysema  o Not on home O2  o Arrived to hospitals on 3L NC  o Wean NC as tolerated  o Pulmonary toilet  o Smoking cessation - 1PPD smoker  o Nicotine patch  o Q1hr I/S use  o F/u cxr      GI:    No acute concerns      :    No acute concerns      F/E/N:    F - hold off pending NSG recs can consider resume diet initiation    E - repletion PRN   N - NPO      Heme/Onc:    Diagnosis: MGUS w/recurrent DVTs  o Holding home Eliquis and ASA  o Holding DVT ppx pending NSG consult  o SCDs  o Outpatient follow up with Hematology   Diagnosis: Anemia  o Hgb 7 7  o Trend Q8hrs      Endo:    No acute concerns  o 3/14/22 A1C 5 2%      ID:    No acute concerns      MSK/Skin:    Diagnosis: Ambulatory dysfunction   o S/p mechanical fall  o No home assistive devices  o PT OT consults   Diagnosis: At risk for deconditioning  o Q2hr repositioning  o LWC PRN  o PT OT consults    Diagnosis: Scalp laceration  o S/p staple repair at OSH  o [address]  o Outpatient follow up w/trauma clinic 10-14d for removal    Disposition: Admit to Stepdown Level 1  Code Status: Level 1 - Full Code  --------------------------------------------------------------------------------------------------------------  Review of Systems   Constitutional: Negative  HENT: Negative  Eyes: Negative  Respiratory: Negative  Cardiovascular: Negative  Gastrointestinal: Negative  Endocrine: Negative  Genitourinary: Negative  Musculoskeletal: Negative  Skin: Negative  Allergic/Immunologic: Negative  Neurological: Positive for headaches  Negative for weakness, light-headedness and numbness  Hematological: Negative  Psychiatric/Behavioral: Negative  All other systems reviewed and are negative  A 12-point, complete review of systems was reviewed and negative except as stated above     Physical Exam  Vitals reviewed  Constitutional:       General: She is not in acute distress  Appearance: She is ill-appearing  Comments: Frail, baseline generalized tremors, ecchymosis to b/l eyes   HENT:      Head:      Comments: Large scalp laceration s/p staple repair with some oozing     Right Ear: External ear normal       Left Ear: External ear normal       Nose: Nose normal       Mouth/Throat:      Mouth: Mucous membranes are dry  Pharynx: Oropharynx is clear  Eyes:      Extraocular Movements: Extraocular movements intact  Conjunctiva/sclera: Conjunctivae normal       Comments: Glasses, ecchymosis   Neck:      Comments: Cervical collar in place  Cardiovascular:      Rate and Rhythm: Normal rate  Pulmonary:      Effort: Pulmonary effort is normal  No respiratory distress        Comments: No accessory muscle use, on 3L NC  Abdominal:      General: There is no distension  Palpations: Abdomen is soft  Tenderness: There is no abdominal tenderness  Musculoskeletal:         General: No swelling, tenderness or deformity  Normal range of motion  Comments: L hip ecchymosis, no pelvic instability or pain, m/s grossly intact to b/l upper and lower extremities   Skin:     General: Skin is warm and dry  Findings: Bruising present  Neurological:      General: No focal deficit present  Mental Status: She is alert and oriented to person, place, and time  Cranial Nerves: No cranial nerve deficit  Sensory: No sensory deficit  Motor: No weakness  Psychiatric:         Mood and Affect: Mood normal          Behavior: Behavior normal          Thought Content: Thought content normal          Judgment: Judgment normal        --------------------------------------------------------------------------------------------------------------  Vitals:   Vitals:    08/21/22 1015 08/21/22 1030 08/21/22 1044 08/21/22 1109   BP: 92/56 165/71  92/51   Pulse: 68 64 60 58   Resp: 20 (!) 26 18 15   Temp:       TempSrc:       SpO2: 100% 99% 99% 100%     Temp  Min: 97 5 °F (36 4 °C)  Max: 97 9 °F (36 6 °C)        There is no height or weight on file to calculate BMI    PAP:  , PAP mean:   , CVP:  , PCWP:   , SvO2:   , ScvO2:  , CO:  , CI:  , SVR:  , SVRI:  , PVR:  , SV:  , SVI:  , ICP Mean:  , CPP:      Laboratory and Diagnostics:  Results from last 7 days   Lab Units 08/21/22  0316   WBC Thousand/uL 9 36   HEMOGLOBIN g/dL 7 7*   HEMATOCRIT % 25 5*   PLATELETS Thousands/uL 169   NEUTROS PCT % 65   MONOS PCT % 10     Results from last 7 days   Lab Units 08/21/22  0316   SODIUM mmol/L 139   POTASSIUM mmol/L 4 4   CHLORIDE mmol/L 105   CO2 mmol/L 29   ANION GAP mmol/L 5   BUN mg/dL 20   CREATININE mg/dL 0 84   CALCIUM mg/dL 7 4*   GLUCOSE RANDOM mg/dL 121   ALT U/L 15   AST U/L 51*   ALK PHOS U/L 108   ALBUMIN g/dL 2 4* TOTAL BILIRUBIN mg/dL 0 56          Results from last 7 days   Lab Units 08/21/22  0316   INR  1 36*   PTT seconds 33              ABG:    VBG:          Micro:        EKG: sinus rhythm w/short MD interval  Imaging: I have personally reviewed pertinent reports  Historical Information   Past Medical History:   Diagnosis Date    Basedow's disease     Disease of thyroid gland     DVT (deep venous thrombosis) (HCC)     MGUS (monoclonal gammopathy of unknown significance)     Pulmonary emphysema (HCC)      No past surgical history on file  Social History   Social History     Substance and Sexual Activity   Alcohol Use Not on file     Social History     Substance and Sexual Activity   Drug Use Not on file     Social History     Tobacco Use   Smoking Status Not on file   Smokeless Tobacco Not on file     Exercise History: ambulatory dysfunction  Family History:   No family history on file  I have reviewed this patient's family history and commented on sigificant items within the HPI      Medications:  Current Facility-Administered Medications   Medication Dose Route Frequency    acetaminophen (TYLENOL) tablet 975 mg  975 mg Oral Q8H [facility] & intermediate    HYDROmorphone HCl (DILAUDID) injection 0 2 mg  0 2 mg Intravenous Q4H PRN    lidocaine (LIDODERM) 5 % patch 1 patch  1 patch Topical Daily    nicotine (NICODERM CQ) 14 mg/24hr TD 24 hr patch 14 mg  14 mg Transdermal Daily    oxyCODONE (ROXICODONE) IR tablet 2 5 mg  2 5 mg Oral Q4H PRN    oxyCODONE (ROXICODONE) IR tablet 5 mg  5 mg Oral Q4H PRN    senna-docusate sodium (SENOKOT S) 8 6-50 mg per tablet 2 tablet  2 tablet Oral BID     Home medications:  Prior to Admission Medications   Prescriptions Last Dose Informant Patient Reported? Taking?    PARoxetine (PAXIL) 20 mg tablet   Yes No   Sig: Take 20 mg by mouth daily   apixaban (Eliquis) 5 mg   Yes No   Sig: Take 5 mg by mouth 2 (two) times a day   aspirin 81 mg chewable tablet   Yes No   Sig: Chew 81 mg daily carbidopa-levodopa-entacapone (STALEVO) -200 MG per tablet   Yes No   Sig: Take 1 tablet by mouth 4 (four) times a day   gabapentin (NEURONTIN) 300 mg capsule   Yes No   Sig: Take 300 mg by mouth 3 (three) times a day   propranolol (INNOPRAN XL) 120 MG 24 hr capsule   Yes No   Sig: Take 160 mg by mouth daily at bedtime      Facility-Administered Medications: None     Allergies:  No Known Allergies  ------------------------------------------------------------------------------------------------------------  Advance Directive and Living Will:      Power of :    POLST:    ------------------------------------------------------------------------------------------------------------  Anticipated Length of Stay is > 2 midnights    Care Time Delivered:   Upon my evaluation, this patient had a high probability of imminent or life-threatening deterioration due to cervical spine fractures, which required my direct attention, intervention, and personal management  I have personally provided 45 minutes of critical care time, exclusive of procedures, teaching, family meetings, and any prior time recorded by providers other than myself  Felisa Hughes MD        Portions of the record may have been created with voice recognition software  Occasional wrong word or "sound a like" substitutions may have occurred due to the inherent limitations of voice recognition software    Read the chart carefully and recognize, using context, where substitutions have occurred

## 2022-08-21 NOTE — TRAUMA DOCUMENTATION
Patient to be transfer to [facility] ED via [address] [facility]  Accepting physician Dr Saray Lozada, number for report [phone]  ETA of transportation 0830

## 2022-08-21 NOTE — ASSESSMENT & PLAN NOTE
Arrived to [facility] on 3L supplemental O2 sating 100%  Not on home O2  Wean O2 as able  Pulmonary toilet  F/u cxr r/o rib fractures - no chest wall tenderness  Q1hr I/S use  1PPD smoker  Nicotine patch

## 2022-08-21 NOTE — ASSESSMENT & PLAN NOTE
11cm scalp laceration after fall  S/p staple repair at OSH 8/21  Staple removal 10-14 days  [facility]  Outpatient follow up trauma clinic

## 2022-08-21 NOTE — ASSESSMENT & PLAN NOTE
Takes carbidopa-levodopa-entacapone (STALEVO) -200 MG per tablet  Formulary recommendation per pharmacy:

## 2022-08-21 NOTE — QUICK NOTE
Critical Care Quick Note:    Received permission from patient to call and speak with her sister, Mk Baptiste at [phone]  Patient states Mk Baptiste helps manage her home medications and distribution  I called Mk Baptiste and performed a medication reconciliation with her regarding the patient  All home medications were re-ordered and timed appropriately aside from AP/AC  Will continue to hold Eliquis and ASA      Claudia Ritter MD  1:30pm 08/21/22

## 2022-08-21 NOTE — ASSESSMENT & PLAN NOTE
As per C2 cervical fracture plan:    8/21 CT cspine: anterioinferior C2 fx, L transverse process fx C3, old L clavicle fx  Neurosurgery consulted  Appreciate recommendations  Cervical spine precautions  Cervical collar  Holding DVT ppx and AC/AP pending NSG recommendations  Neurovascular exams Q1hr  No focal deficits on arrival to [facility]

## 2022-08-21 NOTE — ANESTHESIA PREPROCEDURE EVALUATION
Procedure:  CLOSED REDUCTION NASAL FRACTURE (Bilateral Nose)    Relevant Problems   CARDIO   (+) DVT (deep venous thrombosis) (HCC)   (+) Unspecified injury of left vertebral artery, initial encounter      PULMONARY   (+) Pulmonary emphysema (HCC)      Endocrine   (+) Basedow's disease      Nervous and Auditory   (+) Parkinson disease (HCC)      Musculoskeletal and Integument   (+) C2 cervical fracture (HCC)   (+) C3 cervical fracture (HCC)   (+) Nasal fracture      Other   (+) Fall   (+) MGUS (monoclonal gammopathy of unknown significance)   (+) Scalp laceration, initial encounter        Physical Exam    Airway    Mallampati score: III  TM Distance: >3 FB  Neck ROM: full     Dental   upper dentures and lower dentures,     Cardiovascular  Cardiovascular exam normal    Pulmonary  Pulmonary exam normal     Other Findings    C collar in place  Emphysema not on home O2    Anesthesia Plan  ASA Score- 3     Anesthesia Type- general with ASA Monitors  Additional Monitors:   Airway Plan: ETT  Plan Factors-Exercise tolerance (METS): >4 METS  Chart reviewed  EKG reviewed  Existing labs reviewed  Patient summary reviewed  Patient is a current smoker  Induction- intravenous  Postoperative Plan- Plan for postoperative opioid use  Informed Consent- Anesthetic plan and risks discussed with patient  I personally reviewed this patient with the CRNA  Discussed and agreed on the Anesthesia Plan with the CRNA  Ty Jason

## 2022-08-21 NOTE — PROGRESS NOTES
Consultation - Neurosurgery   Romario Silva 57 y o  female MRN: [id number]  Unit/Bed#: ICU 13 Encounter: [id number]        Assessment/Plan     Assessment:  C2 and C3 nondisplaced fractures  Scalp laceration  Longstanding Parkinson's  Multiple falls with high fall risk  Longstanding left vertebral artery occlusion  Scalp laceration    Plan:  [place] cervical collar  I anticipate that she will heal in a collar  This is likely a longstanding vertebral artery occlusion  If not otherwise contraindicated I would recommend continuing her aspirin  Upright x-rays and collar      Consults    History of Present Illness   HPI: Romario Silva is a 57y o  year old female who fell while in the bathroom  She has fallen frequently lately  She has a very dedicated  who his by her side but was not by her side when she fell  She does not use a walker  She has longstanding Parkinson's disease  She has had a previous anterior cervical diskectomy and fixation fusion at [facility]  She developed a post surgical deep venous thrombosis  She has been maintained on Eliquis  After stopping Eliquis she had a no other DVT  Eliquis was restarted  Over the last 9 months aspirin 325 mg was added although the family is uncertain as to why        Review of Systems  History obtained from spouse, chart review and the patient  General ROS:  Parkinson's  Psychological ROS: negative  Ophthalmic ROS: negative  ENT ROS:  Balance difficulties  Allergy and Immunology ROS: negative  Hematological and Lymphatic ROS: negative  Endocrine ROS: negative  Respiratory ROS: no cough, shortness of breath, or wheezing  Cardiovascular ROS: no chest pain or dyspnea on exertion  Gastrointestinal ROS: no abdominal pain, change in bowel habits, or black or bloody stools  Genito-Urinary ROS: negative  Musculoskeletal ROS: negative  Neurological ROS:  Parkinson's with multiple falls    Historical Information   Past Medical History:   Diagnosis Date    Basedow's disease     Disease of thyroid gland     DVT (deep venous thrombosis) (HCC)     MGUS (monoclonal gammopathy of unknown significance)     Pulmonary emphysema (HCC)      No past surgical history on file  Social History     Substance and Sexual Activity   Alcohol Use Not on file     Social History     Substance and Sexual Activity   Drug Use Not on file     Social History     Tobacco Use   Smoking Status Not on file   Smokeless Tobacco Not on file     No family history on file  Meds/Allergies   Current Facility-Administered Medications   Medication Dose Route Frequency    acetaminophen (TYLENOL) tablet 975 mg  975 mg Oral Q8H [address]    HYDROmorphone HCl (DILAUDID) injection 0 2 mg  0 2 mg Intravenous Q4H PRN    lidocaine (LIDODERM) 5 % patch 1 patch  1 patch Topical Daily    nicotine (NICODERM CQ) 14 mg/24hr TD 24 hr patch 14 mg  14 mg Transdermal Daily    oxyCODONE (ROXICODONE) IR tablet 2 5 mg  2 5 mg Oral Q4H PRN    oxyCODONE (ROXICODONE) IR tablet 5 mg  5 mg Oral Q4H PRN    senna-docusate sodium (SENOKOT S) 8 6-50 mg per tablet 2 tablet  2 tablet Oral BID     Medications Prior to Admission   Medication    apixaban (Eliquis) 5 mg    aspirin 81 mg chewable tablet    carbidopa-levodopa-entacapone (STALEVO) -200 MG per tablet    gabapentin (NEURONTIN) 300 mg capsule    PARoxetine (PAXIL) 20 mg tablet    propranolol (INNOPRAN XL) 120 MG 24 hr capsule         No Known Allergies    Objective     PHYSICAL EXAMINATION  General appearance: alert, appears stated age, cooperative and no distress, scalp laceration  Head: Normocephalic, scalp laceration stapled  Eyes: conjunctivae/corneas clear  Raccoon eyes PERRL, EOM's intact     Neck: no adenopathy, well-healed incision, wearing a collar, no carotid bruit, no JVD, some neck pain symmetrical, trachea midline and thyroid not enlarged,   Back: no pain to palpation  Extremities: extremities normal, atraumatic, no cyanosis or edema  Neurologic:  Awake, alert, oriented x3  speech: normal in context and clarity  memory: intact grossly 3 at 3minutes  motor strength:5/5 bilaterally in the upper and lower extremities, full proximally and distally  Tremors: involuntary movements at low-frequency high amplitude consistent with Parkinson's  No abnormal movements  sensation: intact to vibration, position, pain, and light touch  cerebellar: finger-to-nose and heel-to-shin intact  gait:  Not ambulated    Vitals:Blood pressure 92/51, pulse 58, temperature 97 5 °F (36 4 °C), temperature source Oral, resp  rate 15, SpO2 100 %  ,There is no height or weight on file to calculate BMI  Lab Results:  Reviewed    No intake or output data in the 24 hours ending 08/21/22 1222    Imaging Studies: I have personally reviewed pertinent films in PACS  I spent 1 hour with the patient  Asked to review the patient's images by the surgical resident    Briefly this is a 74-year-old female fallen a bathroom on [facility] she has a history of a previous anterior cervical diskectomy and fixation fusion        Patient has a large scalp laceration  Neurologically she is reported to be intact    A CTA is reviewed which demonstrates the left vertebral artery to have minimal opacification although the transverse foramen appear to be open on the left  There is ample flow on the right  This is likely a chronic condition although it does not appear to be congenital given the size of the transverse foramen           EKG, Pathology, and Other Studies: I have personally reviewed pertinent reports        VTE Prophylaxis: Sequential compression device [place])     Code Status: Level 1 - Full Code  Advance Directive and Living Will:      Power of :    POLST:      Counseling / Coordination of Care

## 2022-08-21 NOTE — ASSESSMENT & PLAN NOTE
8/21 mechanical fall d/t ambulatory dysfunction and Parkinson's disease  No home assistive devices for ambulation  PT OT consulted  Injuries as below    CTH negative for acute injuries  C cspine w/C2 fx, and L transverse process C3 fx  CT facial bones w/nasal bone fx  CTA neck w/nonenhancement of L vertebral artery  Chest xray pending  FAST pending

## 2022-08-21 NOTE — QUICK NOTE
ICU update    Patient with acute hypotension  BP 76/48 MAP 50, HR 60  Mentating and symptomatic  Repeat Hg 7 7->7 1  Scalp lac hemastatic  No abdominal pain or complaints at this time  No recent medications given  No abdominal imaging on initial evaluation, FAST negative at presentation, repeated FAST negative  Bolused 500ml awaiting 1U pRBC stat  Will recheck Hg post transfusion and if she hasn't responded will obtain CT AP       Renata Ball MD  ICU Fellow

## 2022-08-21 NOTE — PLAN OF CARE
Problem: MOBILITY - ADULT  Goal: Maintain or return to baseline ADL function  Description: INTERVENTIONS:  -  Assess patient's ability to carry out ADLs; assess patient's baseline for ADL function and identify physical deficits which impact ability to perform ADLs (bathing, care of mouth/teeth, toileting, grooming, dressing, etc )  - Assess/evaluate cause of self-care deficits   - Assess range of motion  - Assess patient's mobility; develop plan if impaired  - Assess patient's need for assistive devices and provide as appropriate  - Encourage maximum independence but intervene and supervise when necessary  - Involve family in performance of ADLs  - Assess for home care needs following discharge   - Consider OT consult to assist with ADL evaluation and planning for discharge  - Provide patient education as appropriate  Outcome: Progressing  Goal: Maintains/Returns to pre admission functional level  Description: INTERVENTIONS:  - Perform BMAT or MOVE assessment daily    - Set and communicate daily mobility goal to care team and patient/family/caregiver  - Collaborate with rehabilitation services on mobility goals if consulted  - Perform Range of Motion 3 times a day  - Reposition patient every 2 hours    - Record patient progress and toleration of activity level   Outcome: Progressing     Problem: PAIN - ADULT  Goal: Verbalizes/displays adequate comfort level or baseline comfort level  Description: Interventions:  - Encourage patient to monitor pain and request assistance  - Assess pain using appropriate pain scale  - Administer analgesics based on type and severity of pain and evaluate response  - Implement non-pharmacological measures as appropriate and evaluate response  - Consider cultural and social influences on pain and pain management  - Notify physician/advanced practitioner if interventions unsuccessful or patient reports new pain  Outcome: Progressing     Problem: INFECTION - ADULT  Goal: Absence or prevention of progression during hospitalization  Description: INTERVENTIONS:  - Assess and monitor for signs and symptoms of infection  - Monitor lab/diagnostic results  - Monitor all insertion sites, i e  indwelling lines, tubes, and drains  - Monitor endotracheal if appropriate and nasal secretions for changes in amount and color  - Rowe appropriate cooling/warming therapies per order  - Administer medications as ordered  - Instruct and encourage patient and family to use good hand hygiene technique  - Identify and instruct in appropriate isolation precautions for identified infection/condition  Outcome: Progressing  Goal: Absence of fever/infection during neutropenic period  Description: INTERVENTIONS:  - Monitor WBC    Outcome: Progressing     Problem: SAFETY ADULT  Goal: Maintain or return to baseline ADL function  Description: INTERVENTIONS:  -  Assess patient's ability to carry out ADLs; assess patient's baseline for ADL function and identify physical deficits which impact ability to perform ADLs (bathing, care of mouth/teeth, toileting, grooming, dressing, etc )  - Assess/evaluate cause of self-care deficits   - Assess range of motion  - Assess patient's mobility; develop plan if impaired  - Assess patient's need for assistive devices and provide as appropriate  - Encourage maximum independence but intervene and supervise when necessary  - Involve family in performance of ADLs  - Assess for home care needs following discharge   - Consider OT consult to assist with ADL evaluation and planning for discharge  - Provide patient education as appropriate  Outcome: Progressing  Goal: Maintains/Returns to pre admission functional level  Description: INTERVENTIONS:  - Perform BMAT or MOVE assessment daily    - Set and communicate daily mobility goal to care team and patient/family/caregiver  - Collaborate with rehabilitation services on mobility goals if consulted  - Perform Range of Motion 3 times a day    - Reposition patient every 2 hours  - Record patient progress and toleration of activity level   Outcome: Progressing  Goal: Patient will remain free of falls  Description: INTERVENTIONS:  - Educate patient/family on patient safety including physical limitations  - Instruct patient to call for assistance with activity   - Consult OT/PT to assist with strengthening/mobility   - Keep Call bell within reach  - Keep bed low and locked with side rails adjusted as appropriate  - Keep care items and personal belongings within reach  - Initiate and maintain comfort rounds  - Make Fall Risk Sign visible to staff  - Offer Toileting every 2 Hours, in advance of need  - Initiate/Maintain bed alarm  - Obtain necessary fall risk management equipment  - Apply yellow socks and bracelet for high fall risk patients  - Consider moving patient to room near nurses station  Outcome: Progressing     Problem: DISCHARGE PLANNING  Goal: Discharge to home or other facility with appropriate resources  Description: INTERVENTIONS:  - Identify barriers to discharge w/patient and caregiver  - Arrange for needed discharge resources and transportation as appropriate  - Identify discharge learning needs (meds, wound care, etc )  - Arrange for interpretive services to assist at discharge as needed  - Refer to Case Management Department for coordinating discharge planning if the patient needs post-hospital services based on physician/advanced practitioner order or complex needs related to functional status, cognitive ability, or social support system  Outcome: Progressing     Problem: Knowledge Deficit  Goal: Patient/family/caregiver demonstrates understanding of disease process, treatment plan, medications, and discharge instructions  Description: Complete learning assessment and assess knowledge base    Interventions:  - Provide teaching at level of understanding  - Provide teaching via preferred learning methods  Outcome: Progressing

## 2022-08-21 NOTE — ED NOTES
Linen changed, patient had 175mL urine output on bedpan  C-collar switched out for peds size due to adult being too large on patient        Cm Schofield RN  08/21/22 0000

## 2022-08-21 NOTE — ASSESSMENT & PLAN NOTE
Per Epic review, MGUS w/ h/o recurrent DVTs on Eliquis/ASA  Holding Eliquis and ASA pending NSG evaluation  Outpatient follow up

## 2022-08-22 ENCOUNTER — TELEPHONE (OUTPATIENT)
Dept: NEUROSURGERY | Facility: CLINIC | Age: 62
End: 2022-08-22

## 2022-08-22 PROBLEM — D62 ACUTE BLOOD LOSS ANEMIA: Status: ACTIVE | Noted: 2022-08-22

## 2022-08-22 LAB
ABO GROUP BLD BPU: NORMAL
ALBUMIN SERPL BCP-MCNC: 2.7 G/DL (ref 3.5–5)
ALP SERPL-CCNC: 135 U/L (ref 46–116)
ALT SERPL W P-5'-P-CCNC: 14 U/L (ref 12–78)
ANION GAP SERPL CALCULATED.3IONS-SCNC: 2 MMOL/L (ref 4–13)
AST SERPL W P-5'-P-CCNC: 43 U/L (ref 5–45)
ATRIAL RATE: 63 BPM
BASOPHILS # BLD AUTO: 0.04 THOUSANDS/ΜL (ref 0–0.1)
BASOPHILS NFR BLD AUTO: 1 % (ref 0–1)
BILIRUB SERPL-MCNC: 2.48 MG/DL (ref 0.2–1)
BPU ID: NORMAL
BUN SERPL-MCNC: 13 MG/DL (ref 5–25)
CALCIUM ALBUM COR SERPL-MCNC: 9.5 MG/DL (ref 8.3–10.1)
CALCIUM SERPL-MCNC: 8.5 MG/DL (ref 8.3–10.1)
CHLORIDE SERPL-SCNC: 103 MMOL/L (ref 96–108)
CO2 SERPL-SCNC: 31 MMOL/L (ref 21–32)
CREAT SERPL-MCNC: 0.78 MG/DL (ref 0.6–1.3)
CROSSMATCH: NORMAL
EOSINOPHIL # BLD AUTO: 0.16 THOUSAND/ΜL (ref 0–0.61)
EOSINOPHIL NFR BLD AUTO: 3 % (ref 0–6)
ERYTHROCYTE [DISTWIDTH] IN BLOOD BY AUTOMATED COUNT: 18.6 % (ref 11.6–15.1)
GFR SERPL CREATININE-BSD FRML MDRD: 81 ML/MIN/1.73SQ M
GLUCOSE SERPL-MCNC: 102 MG/DL (ref 65–140)
HCT VFR BLD AUTO: 31.5 % (ref 34.8–46.1)
HCT VFR BLD AUTO: 31.9 % (ref 34.8–46.1)
HGB BLD-MCNC: 9.7 G/DL (ref 11.5–15.4)
HGB BLD-MCNC: 9.8 G/DL (ref 11.5–15.4)
IMM GRANULOCYTES # BLD AUTO: 0.01 THOUSAND/UL (ref 0–0.2)
IMM GRANULOCYTES NFR BLD AUTO: 0 % (ref 0–2)
LYMPHOCYTES # BLD AUTO: 2.7 THOUSANDS/ΜL (ref 0.6–4.47)
LYMPHOCYTES NFR BLD AUTO: 49 % (ref 14–44)
MAGNESIUM SERPL-MCNC: 2.6 MG/DL (ref 1.6–2.6)
MCH RBC QN AUTO: 29.3 PG (ref 26.8–34.3)
MCHC RBC AUTO-ENTMCNC: 30.8 G/DL (ref 31.4–37.4)
MCV RBC AUTO: 95 FL (ref 82–98)
MONOCYTES # BLD AUTO: 0.57 THOUSAND/ΜL (ref 0.17–1.22)
MONOCYTES NFR BLD AUTO: 11 % (ref 4–12)
NEUTROPHILS # BLD AUTO: 1.94 THOUSANDS/ΜL (ref 1.85–7.62)
NEUTS SEG NFR BLD AUTO: 36 % (ref 43–75)
NRBC BLD AUTO-RTO: 0 /100 WBCS
P AXIS: 89 DEGREES
PHOSPHATE SERPL-MCNC: 3.6 MG/DL (ref 2.3–4.1)
PLATELET # BLD AUTO: 135 THOUSANDS/UL (ref 149–390)
PMV BLD AUTO: 11.9 FL (ref 8.9–12.7)
POTASSIUM SERPL-SCNC: 4.5 MMOL/L (ref 3.5–5.3)
PR INTERVAL: 110 MS
PROT SERPL-MCNC: 6.1 G/DL (ref 6.4–8.4)
QRS AXIS: 72 DEGREES
QRSD INTERVAL: 72 MS
QT INTERVAL: 398 MS
QTC INTERVAL: 407 MS
RBC # BLD AUTO: 3.31 MILLION/UL (ref 3.81–5.12)
SODIUM SERPL-SCNC: 136 MMOL/L (ref 135–147)
T WAVE AXIS: 57 DEGREES
UNIT DISPENSE STATUS: NORMAL
UNIT PRODUCT CODE: NORMAL
UNIT PRODUCT VOLUME: 350 ML
UNIT RH: NORMAL
VENTRICULAR RATE: 63 BPM
WBC # BLD AUTO: 5.42 THOUSAND/UL (ref 4.31–10.16)

## 2022-08-22 PROCEDURE — 93010 ELECTROCARDIOGRAM REPORT: CPT | Performed by: INTERNAL MEDICINE

## 2022-08-22 PROCEDURE — 99233 SBSQ HOSP IP/OBS HIGH 50: CPT | Performed by: STUDENT IN AN ORGANIZED HEALTH CARE EDUCATION/TRAINING PROGRAM

## 2022-08-22 PROCEDURE — 85014 HEMATOCRIT: CPT | Performed by: PHYSICIAN ASSISTANT

## 2022-08-22 PROCEDURE — 80053 COMPREHEN METABOLIC PANEL: CPT | Performed by: STUDENT IN AN ORGANIZED HEALTH CARE EDUCATION/TRAINING PROGRAM

## 2022-08-22 PROCEDURE — 85018 HEMOGLOBIN: CPT | Performed by: PHYSICIAN ASSISTANT

## 2022-08-22 PROCEDURE — 84100 ASSAY OF PHOSPHORUS: CPT | Performed by: STUDENT IN AN ORGANIZED HEALTH CARE EDUCATION/TRAINING PROGRAM

## 2022-08-22 PROCEDURE — 99233 SBSQ HOSP IP/OBS HIGH 50: CPT | Performed by: NEUROLOGICAL SURGERY

## 2022-08-22 PROCEDURE — 97163 PT EVAL HIGH COMPLEX 45 MIN: CPT

## 2022-08-22 PROCEDURE — 94760 N-INVAS EAR/PLS OXIMETRY 1: CPT

## 2022-08-22 PROCEDURE — 97167 OT EVAL HIGH COMPLEX 60 MIN: CPT

## 2022-08-22 PROCEDURE — 85025 COMPLETE CBC W/AUTO DIFF WBC: CPT | Performed by: STUDENT IN AN ORGANIZED HEALTH CARE EDUCATION/TRAINING PROGRAM

## 2022-08-22 PROCEDURE — 0NSBXZZ REPOSITION NASAL BONE, EXTERNAL APPROACH: ICD-10-PCS | Performed by: DENTIST

## 2022-08-22 PROCEDURE — 83735 ASSAY OF MAGNESIUM: CPT | Performed by: STUDENT IN AN ORGANIZED HEALTH CARE EDUCATION/TRAINING PROGRAM

## 2022-08-22 PROCEDURE — 94640 AIRWAY INHALATION TREATMENT: CPT

## 2022-08-22 PROCEDURE — NC001 PR NO CHARGE: Performed by: PHYSICIAN ASSISTANT

## 2022-08-22 RX ORDER — COCAINE HYDROCHLORIDE 40 MG/ML
SOLUTION NASAL AS NEEDED
Status: DISCONTINUED | OUTPATIENT
Start: 2022-08-22 | End: 2022-08-22 | Stop reason: HOSPADM

## 2022-08-22 RX ORDER — PROPOFOL 10 MG/ML
INJECTION, EMULSION INTRAVENOUS AS NEEDED
Status: DISCONTINUED | OUTPATIENT
Start: 2022-08-22 | End: 2022-08-22

## 2022-08-22 RX ORDER — SODIUM CHLORIDE, SODIUM LACTATE, POTASSIUM CHLORIDE, CALCIUM CHLORIDE 600; 310; 30; 20 MG/100ML; MG/100ML; MG/100ML; MG/100ML
INJECTION, SOLUTION INTRAVENOUS CONTINUOUS PRN
Status: DISCONTINUED | OUTPATIENT
Start: 2022-08-22 | End: 2022-08-22

## 2022-08-22 RX ORDER — LIDOCAINE HYDROCHLORIDE 10 MG/ML
INJECTION, SOLUTION EPIDURAL; INFILTRATION; INTRACAUDAL; PERINEURAL AS NEEDED
Status: DISCONTINUED | OUTPATIENT
Start: 2022-08-22 | End: 2022-08-22

## 2022-08-22 RX ORDER — SUCCINYLCHOLINE/SOD CL,ISO/PF 100 MG/5ML
SYRINGE (ML) INTRAVENOUS AS NEEDED
Status: DISCONTINUED | OUTPATIENT
Start: 2022-08-22 | End: 2022-08-22

## 2022-08-22 RX ORDER — ENOXAPARIN SODIUM 100 MG/ML
30 INJECTION SUBCUTANEOUS EVERY 12 HOURS SCHEDULED
Status: DISCONTINUED | OUTPATIENT
Start: 2022-08-22 | End: 2022-08-23

## 2022-08-22 RX ORDER — HYDROCODONE BITARTRATE AND ACETAMINOPHEN 5; 325 MG/1; MG/1
1 TABLET ORAL EVERY 6 HOURS PRN
Status: DISCONTINUED | OUTPATIENT
Start: 2022-08-22 | End: 2022-08-23

## 2022-08-22 RX ORDER — FENTANYL CITRATE 50 UG/ML
INJECTION, SOLUTION INTRAMUSCULAR; INTRAVENOUS AS NEEDED
Status: DISCONTINUED | OUTPATIENT
Start: 2022-08-22 | End: 2022-08-22

## 2022-08-22 RX ORDER — SODIUM CHLORIDE, SODIUM GLUCONATE, SODIUM ACETATE, POTASSIUM CHLORIDE, MAGNESIUM CHLORIDE, SODIUM PHOSPHATE, DIBASIC, AND POTASSIUM PHOSPHATE .53; .5; .37; .037; .03; .012; .00082 G/100ML; G/100ML; G/100ML; G/100ML; G/100ML; G/100ML; G/100ML
500 INJECTION, SOLUTION INTRAVENOUS ONCE
Status: COMPLETED | OUTPATIENT
Start: 2022-08-22 | End: 2022-08-22

## 2022-08-22 RX ORDER — ALBUTEROL SULFATE 2.5 MG/3ML
2.5 SOLUTION RESPIRATORY (INHALATION) EVERY 6 HOURS PRN
Status: DISCONTINUED | OUTPATIENT
Start: 2022-08-22 | End: 2022-08-30 | Stop reason: HOSPADM

## 2022-08-22 RX ORDER — ONDANSETRON 2 MG/ML
INJECTION INTRAMUSCULAR; INTRAVENOUS AS NEEDED
Status: DISCONTINUED | OUTPATIENT
Start: 2022-08-22 | End: 2022-08-22

## 2022-08-22 RX ORDER — ONDANSETRON 2 MG/ML
INJECTION INTRAMUSCULAR; INTRAVENOUS
Status: COMPLETED
Start: 2022-08-22 | End: 2022-08-22

## 2022-08-22 RX ORDER — ONDANSETRON 2 MG/ML
4 INJECTION INTRAMUSCULAR; INTRAVENOUS EVERY 4 HOURS PRN
Status: DISCONTINUED | OUTPATIENT
Start: 2022-08-22 | End: 2022-08-30 | Stop reason: HOSPADM

## 2022-08-22 RX ORDER — LIDOCAINE HYDROCHLORIDE AND EPINEPHRINE 10; 10 MG/ML; UG/ML
INJECTION, SOLUTION INFILTRATION; PERINEURAL AS NEEDED
Status: DISCONTINUED | OUTPATIENT
Start: 2022-08-22 | End: 2022-08-22 | Stop reason: HOSPADM

## 2022-08-22 RX ADMIN — SODIUM CHLORIDE, SODIUM GLUCONATE, SODIUM ACETATE, POTASSIUM CHLORIDE, MAGNESIUM CHLORIDE, SODIUM PHOSPHATE, DIBASIC, AND POTASSIUM PHOSPHATE 500 ML: .53; .5; .37; .037; .03; .012; .00082 INJECTION, SOLUTION INTRAVENOUS at 02:43

## 2022-08-22 RX ADMIN — LIDOCAINE HYDROCHLORIDE 50 MG: 10 INJECTION, SOLUTION EPIDURAL; INFILTRATION; INTRACAUDAL; PERINEURAL at 16:13

## 2022-08-22 RX ADMIN — CARBIDOPA AND LEVODOPA 2 TABLET: 25; 100 TABLET ORAL at 09:22

## 2022-08-22 RX ADMIN — CARBIDOPA AND LEVODOPA 2 TABLET: 25; 100 TABLET ORAL at 18:28

## 2022-08-22 RX ADMIN — SODIUM CHLORIDE 3 G: 9 INJECTION, SOLUTION INTRAVENOUS at 16:20

## 2022-08-22 RX ADMIN — ONDANSETRON 4 MG: 2 INJECTION INTRAMUSCULAR; INTRAVENOUS at 16:24

## 2022-08-22 RX ADMIN — ASPIRIN 81 MG: 81 TABLET, COATED ORAL at 09:22

## 2022-08-22 RX ADMIN — PROPRANOLOL HYDROCHLORIDE 160 MG: 80 CAPSULE, EXTENDED RELEASE ORAL at 13:39

## 2022-08-22 RX ADMIN — ENOXAPARIN SODIUM 30 MG: 30 INJECTION SUBCUTANEOUS at 22:11

## 2022-08-22 RX ADMIN — AMPICILLIN SODIUM AND SULBACTAM SODIUM 3 G: 2; 1 INJECTION, POWDER, FOR SOLUTION INTRAMUSCULAR; INTRAVENOUS at 23:38

## 2022-08-22 RX ADMIN — PAROXETINE HYDROCHLORIDE 20 MG: 20 TABLET, FILM COATED ORAL at 09:22

## 2022-08-22 RX ADMIN — ENTACAPONE 200 MG: 200 TABLET, FILM COATED ORAL at 13:36

## 2022-08-22 RX ADMIN — ENTACAPONE 200 MG: 200 TABLET, FILM COATED ORAL at 09:22

## 2022-08-22 RX ADMIN — HEPARIN SODIUM 5000 UNITS: 5000 INJECTION INTRAVENOUS; SUBCUTANEOUS at 05:15

## 2022-08-22 RX ADMIN — GABAPENTIN 300 MG: 300 CAPSULE ORAL at 09:22

## 2022-08-22 RX ADMIN — LIDOCAINE 5% 1 PATCH: 700 PATCH TOPICAL at 09:34

## 2022-08-22 RX ADMIN — NICOTINE 14 MG: 14 PATCH, EXTENDED RELEASE TRANSDERMAL at 09:34

## 2022-08-22 RX ADMIN — AMPICILLIN SODIUM AND SULBACTAM SODIUM 3 G: 2; 1 INJECTION, POWDER, FOR SOLUTION INTRAMUSCULAR; INTRAVENOUS at 09:21

## 2022-08-22 RX ADMIN — ACETAMINOPHEN 975 MG: 325 TABLET ORAL at 05:14

## 2022-08-22 RX ADMIN — ENTACAPONE 200 MG: 200 TABLET, FILM COATED ORAL at 18:29

## 2022-08-22 RX ADMIN — SODIUM CHLORIDE, SODIUM LACTATE, POTASSIUM CHLORIDE, AND CALCIUM CHLORIDE: .6; .31; .03; .02 INJECTION, SOLUTION INTRAVENOUS at 16:03

## 2022-08-22 RX ADMIN — ALBUTEROL SULFATE 2.5 MG: 2.5 SOLUTION RESPIRATORY (INHALATION) at 17:31

## 2022-08-22 RX ADMIN — ENOXAPARIN SODIUM 30 MG: 30 INJECTION SUBCUTANEOUS at 11:05

## 2022-08-22 RX ADMIN — Medication 60 MG: at 16:13

## 2022-08-22 RX ADMIN — AMPICILLIN SODIUM AND SULBACTAM SODIUM 3 G: 2; 1 INJECTION, POWDER, FOR SOLUTION INTRAMUSCULAR; INTRAVENOUS at 02:16

## 2022-08-22 RX ADMIN — PROPOFOL 100 MG: 10 INJECTION, EMULSION INTRAVENOUS at 16:13

## 2022-08-22 RX ADMIN — ACETYLCYSTEINE 600 MG: 200 SOLUTION ORAL; RESPIRATORY (INHALATION) at 14:05

## 2022-08-22 RX ADMIN — GABAPENTIN 300 MG: 300 CAPSULE ORAL at 22:11

## 2022-08-22 RX ADMIN — ENTACAPONE 200 MG: 200 TABLET, FILM COATED ORAL at 22:11

## 2022-08-22 RX ADMIN — SENNOSIDES AND DOCUSATE SODIUM 2 TABLET: 50; 8.6 TABLET ORAL at 09:22

## 2022-08-22 RX ADMIN — ONDANSETRON 4 MG: 2 INJECTION INTRAMUSCULAR; INTRAVENOUS at 10:32

## 2022-08-22 RX ADMIN — LEVALBUTEROL HYDROCHLORIDE 1.25 MG: 1.25 SOLUTION, CONCENTRATE RESPIRATORY (INHALATION) at 19:39

## 2022-08-22 RX ADMIN — CARBIDOPA AND LEVODOPA 2 TABLET: 25; 100 TABLET ORAL at 13:36

## 2022-08-22 RX ADMIN — SENNOSIDES AND DOCUSATE SODIUM 2 TABLET: 50; 8.6 TABLET ORAL at 18:31

## 2022-08-22 RX ADMIN — GABAPENTIN 300 MG: 300 CAPSULE ORAL at 18:30

## 2022-08-22 RX ADMIN — CARBIDOPA AND LEVODOPA 2 TABLET: 25; 100 TABLET ORAL at 22:11

## 2022-08-22 RX ADMIN — ACETAMINOPHEN 975 MG: 325 TABLET ORAL at 22:11

## 2022-08-22 RX ADMIN — ACETYLCYSTEINE 600 MG: 200 SOLUTION ORAL; RESPIRATORY (INHALATION) at 19:39

## 2022-08-22 RX ADMIN — FENTANYL CITRATE 50 MCG: 50 INJECTION INTRAMUSCULAR; INTRAVENOUS at 16:13

## 2022-08-22 RX ADMIN — LEVALBUTEROL HYDROCHLORIDE 1.25 MG: 1.25 SOLUTION, CONCENTRATE RESPIRATORY (INHALATION) at 07:44

## 2022-08-22 RX ADMIN — LEVALBUTEROL HYDROCHLORIDE 1.25 MG: 1.25 SOLUTION, CONCENTRATE RESPIRATORY (INHALATION) at 14:05

## 2022-08-22 RX ADMIN — ACETYLCYSTEINE 600 MG: 200 SOLUTION ORAL; RESPIRATORY (INHALATION) at 07:44

## 2022-08-22 NOTE — PROGRESS NOTES
[facility]  ICU Transfer Note Harl Books 1960, 57 y o  female MRN: [id number]  Unit/Bed#: ICU 13 Encounter: [id number]  Primary Care Provider: No primary care provider on file  Date and time admitted to hospital: 8/21/2022  9:49 AM    * C2 cervical fracture [facility])  Assessment & Plan  · 8/21 CT cspine: anterioinferior C2 fx, L transverse process fx C3, old L clavicle fx  · Neurosurgery consulted  · Appreciate recommendations  · Cervical spine precautions  · Non-op in cervical collar, upright imaging    C3 cervical fracture (HCC)  Assessment & Plan  As per C2 cervical fracture plan:      Unspecified injury of left vertebral artery, initial encounter  Assessment & Plan  8/21 CTA neck: No vascular enhancement of left vertebral artery in the neck  However, in this patient with no acute/persistent neurologic symptoms, the appearance is most suggestive of anatomic variation rather than acute traumatic left vertebral artery injury, specifically as there is no enhancement even of left vertebral artery origin  There is enhancement of left vertebral artery and left posterior cerebellar artery above the level of foramen magnum presumably via collateral circulation across vertebral basilar junction  There are patent posterior communicating arteries bilaterally and the basilar artery is somewhat diminutive consistent with anatomic variation       Neurosurgery consulted  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      Nasal fracture  Assessment & Plan  · 8/21 CT facial bones: nasal bone fracture  · Unasyn for PPX  · Plan for operative fixation 8/22    Acute blood loss anemia  Assessment & Plan  · Likely 2/2 scalp laceration losses  · S/p 1u PRBC with appropriate response and stability  · Daily CBC    Scalp laceration, initial encounter  Assessment & Plan  · 11cm scalp laceration after fall  · S/p staple repair at OSH 8/21  · Staple removal 10-14 days  · Outpatient follow up trauma clinic    Pulmonary emphysema ([facility] [address] )  Assessment & Plan  · Arrived to B on 3L supplemental O2 sating 100%  · PRN bronchodilators  · Q1hr I/S use  · 1PPD smoker  · Nicotine patch    Parkinson disease ([facility] [address] )  Assessment & Plan  · Takes carbidopa-levodopa-entacapone (STALEVO) -200 MG per tablet  · paxil  · Propranolol for tremor    DVT (deep venous thrombosis) (HCC)  Assessment & Plan  · On Eliquis for DVT   · Hold in the setting of recent ABLA  · If Hgb stable on 8/23 plan to resume AC    MGUS (monoclonal gammopathy of unknown significance)  Assessment & Plan  · Per Epic review, MGUS w/ h/o recurrent DVTs on Eliquis/ASA      Fall  Assessment & Plan  · 8/21 mechanical fall d/t ambulatory dysfunction and Parkinson's disease  · No home assistive devices for ambulation  · PT OT consulted      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  Assessment & Plan  · levothyroxine      Code Status: Level 1 - Full Code  POA:    POLST:      Reason for ICU admission:   C2/C3 fracture    Active problems:   Principal Problem:    C2 cervical fracture ([facility] [address] )  Active Problems:    C3 cervical fracture ([facility] [address] )    Nasal fracture    Unspecified injury of left vertebral artery, initial encounter    Acute blood loss anemia    Scalp laceration, initial encounter    Pulmonary emphysema (HCC)    Parkinson disease (HCC)    DVT (deep venous thrombosis) (HCC)    MGUS (monoclonal gammopathy of unknown significance)    Fall  Resolved Problems:    Basedow's disease      Consultants:   Neurosurgery  OMFS    History of Present Illness:   57 y o  female w/ PMHx C5-7 anterior discectomy and fusion - Dr Vinny Nassar [facility], H/o Thyrotoxicosis, Parkinsons, MGUS w/recurrent DVTs on ASA/Eliquis, COLLADO, HTN, smoker 1 PPD, Emphysema not on home O2, who presents as a trauma transfer for Neurosurgery evaluation of C2 and C3 fractures after mechanical fall   Patient experienced a ground level fall striking the back of her head  She denies symptoms suggestive of syncopal episode or seizure activity  No neurological deficits on exam and maintained in cervical collar  Additional injuries significant for nasal bone fracture  Summary of clinical course:   Patient with hypotension and anemia yesterday  Received in total 1L IVF and 1u PRBC with improvement in BP  Hemoglobin reyes appropriately and has stayed appropriate  Patient is NPO with anticipation of OMFS fixation of her nasal bone fractures  NSx reccomends non-op in a collar  Patient is stable for downgrade to SD2 non-tele  Recent or scheduled procedures:   8/21 Scalp laceration repair  8/22 OMFS nasal bone repair     Outstanding/pending diagnostics:   NA    Cultures:   NA       Mobilization Plan:   PT/OT    Nutrition Plan:   Regular diet following OR     Invasive Devices Review  Invasive Devices  Report    Peripheral Intravenous Line  Duration           Peripheral IV 08/21/22 Left;Proximal;Ventral (anterior) Forearm 1 day    Peripheral IV 08/22/22 Right;Upper;Ventral (anterior) Arm <1 day                Rationale for remaining devices: NA    VTE Pharmacologic Prophylaxis: Enoxaparin (Lovenox)  VTE Mechanical Prophylaxis: sequential compression device      Spoke with Dyllan VILLA  regarding transfer  Please contact critical care via [employer] with any questions or concerns  Portions of the record may have been created with voice recognition software  Occasional wrong word or "sound a like" substitutions may have occurred due to the inherent limitations of voice recognition software  Read the chart carefully and recognize, using context, where substitutions have occurred

## 2022-08-22 NOTE — OCCUPATIONAL THERAPY NOTE
Occupational Therapy Evaluation     Patient Name: Terence DAILEYHF'V Date: 8/22/2022  Problem List  Principal Problem:    C2 cervical fracture ([address] )  Active Problems:    Parkinson disease ([address] )    C3 cervical fracture ([address] )    Nasal fracture    Unspecified injury of left vertebral artery, initial encounter    Scalp laceration, initial encounter    Fall    DVT (deep venous thrombosis) ([address] )    Pulmonary emphysema (HCC)    MGUS (monoclonal gammopathy of unknown significance)    Acute blood loss anemia    Past Medical History  Past Medical History:   Diagnosis Date    Basedow's disease     Disease of thyroid gland     DVT (deep venous thrombosis) (HCC)     MGUS (monoclonal gammopathy of unknown significance)     Pulmonary emphysema ([facility] [address] )      Past Surgical History  History reviewed  No pertinent surgical history  08/22/22 0902   OT Last Visit   OT Visit Date 08/22/22   Note Type   Note type Evaluation   Restrictions/Precautions   Weight Bearing Precautions Per Order No   Braces or Orthoses (S)  C/S Collar   Other Precautions Multiple lines;Telemetry;Pain; Fall Risk;Spinal precautions;O2  (3L O2; sinus precautions)   Pain Assessment   Pain Assessment Tool 0-10   Pain Score No Pain   Home Living   Type of Home House   Home Layout Two level;Stairs to enter with rails  (5 CLIFTON w/ HR)   Bathroom Shower/Tub Tub/shower unit  (and walk in 2nd floor)   Bathroom Toilet Standard  (raised 2nd floor)   Bathroom Equipment Grab bars around toilet  (on 1st floor)   [address] Other (Comment)  (denies an y)   Additional Comments Pt reports living in 2 SH, 5 CLIFTON w/ HR, 2nd floor setup w/ main bed and bath (tub/stand toilet); pt has full bath on 1st floor also (walk in shower, s/c, raised toilet and GB)   Prior Function   Level of [place] Independent with ADLs and functional mobility   Lives With Spouse; Other (Comment); Family  (sister)   Receives Help From Family   ADL Assistance Independent   IADLs Independent  (laundry on 2nd floor)   Falls in the last 6 months 1 to 4  (3)   Vocational On disability   Comments Pt reports being I w/ ADLS/IADLS (but struggles w/ IADLS), I w/ transfers and functional mobility  (-) ; sister transports  Lifestyle   Autonomy I w/ ADLS/IADLS, transfers and functional mobility PTA   Reciprocal Relationships Pt lives w/ his sister and spouse   Service to Others on SSD; worked at a [employer] on her [address] [facility] [employer] ([address]) [address]  [address] [facility] [address]  [address] [place] [address] Dr 5  [address] [place] [address] Dr 4  [address] 5  Supervision/Setup    [facility] 4  [address] [facility]  3  Moderate [address] 3  Moderate Assistance   Functional Deficit Steadying;Verbal cueing;Supervision/safety; Increased time to complete   Bed Mobility   Supine to Sit 3  Moderate assistance   Additional items Assist x 1; Increased time required;Verbal cues;LE management;HOB elevated   Sit to Supine Unable to assess   Additional Comments Pt sat EOB w/ Fair sitting balance/trunk control   Transfers   Sit to Stand 3  Moderate assistance   Additional items Assist x 1; Increased time required;Verbal cues   Stand to Sit 3  Moderate assistance   Additional items Assist x 1; Increased time required;Verbal cues   Additional Comments HHA used into standing; VC for safety/proper technique   Functional Mobility   Functional Mobility 3  Moderate assistance   Additional Comments Pt took few small steps from EOB to chair w/ Mod A x1; HHA used  VC for safety  Pt O2 dropped to 86%when seated up OOB; educated on deep breathing strategies; RN made aware; remained on 3L O2     Additional items Hand hold assistance   Balance   Static Sitting Fair -   Dynamic Sitting Poor +   Static Standing Poor +   Dynamic Standing Poor   Ambulatory Poor   Activity Tolerance   Activity Tolerance Patient limited by fatigue   Medical Staff Made Aware PTOsbaldo   Nurse Made Aware yes, Issa Allen Assessment   RUE Assessment WFL   LUE Assessment   LUE Assessment WFL   Hand Function   Gross Motor Coordination Functional   Fine Motor Coordination Functional   Sensation   Light Touch No apparent deficits   Proprioception   Proprioception No apparent deficits   Vision-Basic Assessment   Current Vision No visual deficits   Vision - Complex Assessment   Ocular Range of Motion WFL   Cognition   Overall Cognitive Status WFL   Arousal/Participation Responsive; Cooperative   Attention Within functional limits   Orientation Level Oriented X4   Memory Within functional limits   Following Commands Follows all commands and directions without difficulty   Comments Pt is pleasant and cooperative; needs occasional cue for safety   Assessment   Limitation Decreased ADL status; Decreased Safe judgement during ADL;Decreased endurance;Decreased high-level ADLs; Decreased self-care trans   Prognosis Fair   Assessment Pt is a 58 y/o female seen for OT eval s/p adm to [facility] as a transfer from [facility] where she presented after experiencing a mechanical fall from standing height and striking her head on the shower  Pt found to have C2, C3 fractures, nasal bone fractures and vertebral artery occlusion  Pt has C/S collar w/ active spinal precautions; non-op from Nsx  Pt is pending OMFS surgical intervention; maintained sinus precautions and HOB @ 30 degrees  Pt  has a past medical history of Basedow's disease, Disease of thyroid gland, DVT (deep venous thrombosis) ([facility] [address] ), MGUS (monoclonal gammopathy of unknown significance), and Pul[facility] emph[facility]sema ([facility] )  Pt with active OT orders and up with assistance  orders   Pt lives with her spouse and sister in 2 SH, 5 CLIFTON w/ HR, 2nd floor bed/bath, full bath available on 1st  Pt was I w/  ADLS and IADLS, does not drive (sister transports), & required no use of DME PTA  Pt is currently demonstrating the following occupational deficits: S UB ADLS, Min A LB ADLS, Mod A bed mobility, transfers and functional mobility w/ HHA  These deficits that are impacting pt's baseline areas of occupation are a result of the following impairments: endurance, activity tolerance, functional mobility, forward functional reach, balance, trunk control, functional standing tolerance, decreased I w/ ADLS/IADLS, decreased safety awareness and decreased insight into deficits  The following Occupational Performance Areas to address include: bathing/shower, toilet hygiene, dressing, health maintenance, functional mobility, clothing management and household maintenance  Recommend inpatient rehab  upon D/C  Pt to continue to benefit from acute immediate OT services to address the following goals 3-5x/week to  w/in 10-14 days:   Goals   Patient Goals to be independent again   LTG Time Frame 10-   Long Term Goal #1 see below listed goals   Plan   Treatment Interventions ADL retraining;Functional transfer training;UE strengthening/ROM; Endurance training;Patient/family training;Equipment evaluation/education; Compensatory technique education;Continued evaluation; Energy conservation; Activityengagement   Goal Expiration Date 22   OT Frequency 3-5x/wk   Recommendation   Recommendation (S)  Physiatry Consult   OT Discharge Recommendation Post acute rehabilitation services   Additional Comments  The patient's raw score on the AM-PAC Daily Activity inpatient short form is 19, standardized score is 40 22, greater than 39 4  Patients at this level are likely to benefit from discharge to home  Please refer to the recommendation of the Occupational Therapist for safe discharge planning  Pt still requiring Mod A for functional tasks; recommend inpt rehab at this time     Additional Comments 2 Pt seen as a co-evaluation due to the patient's co-morbidities, clinically unstable presentation, and present impairments which are a regression from the patient's baseline   AM-PAC Daily Activity Inpatient   Lower Body Dressing 3   Bathing 3   Toileting 2   Upper Body Dressing 3   Grooming 4   Eating 4   Daily Activity Raw Score 19   Daily Activity Standardized Score (Calc for Raw Score >=11) 40 22   AM-PAC Applied Cognition Inpatient   Following a Speech/Presentation 3   Understanding Ordinary Conversation 4   Taking Medications 4   Remembering Where Things Are Placed or Put Away 4   Remembering List of 4-5 Errands 4   Taking Care of Complicated Tasks 3   Applied Cognition Raw Score 22   Applied Cognition Standardized Score 47 83       GOALS    1) Pt will complete rolling left/right in bed with S assist, as prerequisite for further engagement in ADLS   2) Pt will complete supine to sit transfer with S using B/L UEs to initiate bed mobility   3) Pt will tolerate sitting at EOB 20 minutes with S assist and stable vital signs, as prerequisite for further engagement in ADLS   4) Pt will complete grooming task with S assist and increased time to increase independence in functional tasks  5) Pt will increase B/L UE ROM 1/2 MMT to increase functional UB use during ADLS   6) Pt will complete UB ADLS with Mod I and use of AD/DME as needed to increase independence in functional tasks  7) Pt will complete LB ADLS with S and use of AD/DME as needed to increase independence in functional tasks  8) Pt will complete sit to stand transfer / sit pivot transfer / stand pivot transfer with S assist on/off all ADL surfaces   9) Pt will follow 100% simple one step verbal commands and be A/Ox4 consistently t/o use of external environmental cues to increase awareness for functional tasks  10) Pt will demonstrate 100% carryover of spinal precautions and E C  techniques t/o fx'l I/ADL/ leisure tasks w/o cues s/p skilled education  11) Pt will improve functional mobility to S w/ use of AD/DME as needed to increase engagement in meaningful tasks  12) Pt will be attentive 100% of the time during ongoing full cognitive assessment to assist w/ safe D/C planning and increase safety for functional tasks    Sayra Oleary MS, OTR/L

## 2022-08-22 NOTE — ASSESSMENT & PLAN NOTE
Oblique fracture of the anterior inferior aspect of C2 as well as oblique fracture of the left transverse process of C3   · Status post fall while in bathroom, patient falling frequently lately  History of Parkinson's  · History of previous anterior cervical diskectomy and fixation fusion at [facility]  Unfortunately postoperatively patient developed DVT and has been maintained on Eliquis  And patient currently on aspirin  · Patient also sustained nasal fracture    Imaging:    CT cervical spine wo 08/21/2022: Oblique fracture of the anteroinferior aspect of C2 (sagittal image 57, series 604)  Oblique fracture of the left transverse process of C3 (axial image 62, series 5)  Spinal alignment appears maintained  Degenerative changes as described  Old fracture of the left clavicle, pulmonary emphysematous changes, and other findings as above  CTA neck w/wo 8/22/22:No vascular enhancement of left vertebral artery in the neck  However, in this patient with no acute/persistent neurologic symptoms, the appearance is most suggestive of anatomic variation rather than acute traumatic left vertebral artery injury, specifically as there is no enhancement even of left vertebral artery origin  There is enhancement of left vertebral artery and left posterior cerebellar artery above the level of foramen magnum presumably via collateral circulation across vertebral basilar junction  There are patent posterior communicating arteries bilaterally and the basilar artery is somewhat diminutive consistent with anatomic variation  Continued neurologic monitoring is recommended to exclude the development of neurologic symptoms  Atherosclerotic changes, more advanced stability expected for the patient's age but without flow-limiting atherosclerotic stenosis  Emphysematous changes in the visualized upper lung zones  Reidentification of cervical spine degenerative changes and no cervical spine fractures without malalignment  Cervical spine x-ray 08/21/2022:No change C2 reported fracture  C3 left transverse process fracture not appreciated radiographically  Plan:  · Continue neurological checks  · Reviewed imaging with patient  · Ordered Vista collar to be worn at all times, okay for [place] collar for showering  · Medical management and pain control per primary team  · Mobilize with PT/OT with collar  · No neurosurgical intervention warranted  · DVT PPX: SCDs and SQ Lovenox    Neurosurgery will sign off, patient will follow-up in 2 weeks with repeat cervical spine x-rays, call with any further questions or concerns

## 2022-08-22 NOTE — ASSESSMENT & PLAN NOTE
· On Eliquis for DVT   · Hold in the setting of recent ABLA  · If Hgb stable on 8/23 plan to resume [facility] [place]

## 2022-08-22 NOTE — DISCHARGE INSTRUCTIONS
Neurosurgery discharge instructions following neck fracture:     VISTA collar at all times except for showering change to guerline (peach) collar  No bending, twisting or heavy lifting  No pushing or pulling over 10lbs  No strenuous activities  NO DRIVING  **Please notify MD immediately if you have increased neck or arm pain  New numbness and/or weakness in your arm  Difficulty swallowing or breathing especially while lying down  Numbness or weakness in arms or legs   Increased difficulty walking **

## 2022-08-22 NOTE — UTILIZATION REVIEW
Initial Clinical Review    Pt initially presented to [address] [place] [address] ED  Pt was transferred by EMS to [address] [place] [address] for a higher level of care w/ OMFS and Neurosurgery consults  Admission: Date/Time/Statement:   Admission Orders (From admission, onward)     Ordered        08/21/22 1038  Inpatient Admission  Once                      Orders Placed This Encounter   Procedures    Inpatient Admission     Standing Status:   Standing     Number of Occurrences:   1     Order Specific Question:   Level of Care     Answer:   Level 1 Stepdown [13]     Order Specific Question:   Estimated length of stay     Answer:   More than 2 Midnights     Order Specific Question:   Certification     Answer:   I certify that inpatient services are medically necessary for this patient for a duration of greater than two midnights  See H&P and MD Progress Notes for additional information about the patient's course of treatment  Chief Complaint   Patient presents with    Fall     Trauma transfer from [address] [place] [address]  Head strike on bath rub       Initial Presentation: 57 y o  female who initially presented to [address] [place] [address] ED, was transferred by EMS to [address] [place] [address] as a direct admission  Inpatient admission for evaluation and treatment of c-spine fracture  PMHx: Parkinson's, Emphysema, hx of DVT on Eliquis  Presented s/p fall w/ head strike  Found to have 12 cm scalp lac stapled close prior to transfer, C2 & C3 fractures as well as nasal bone fracture  On exam, GCS 15, c-collar, baseline tremor, large scalp lac s/p repair w/ some oozing, b/l ecchymosis to eyes, on supplemental O2 (no baseline requirement), neurovascularly intact   Plan: c-collar, c-spine precautions, analgesics, q1h neuro/neurovascular checks, Supplemental O2, weaned as tolerated; incentive spirometry, NPO, Trend labs, replete electrolytes as needed; hold home Eliquis & ASA, frequent repositioning, Cardio-pulm monitoring, trend H&H q8h  Neurosurgery, OMFS consulted  OMFS Consult: Pt w/ b/l nasal bone and C2/C3 fractures  PlaN: OR for closed reduction of b/l nasal bone fractures once cleared by neurosurgery, NPO, sinus precautions, IV ABX, analgesics, HOB @ 30  Neurosurgery Consult: Pt w/ C2/C3 fractures, CTA showed 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  Tremors consistent w/ Parkinson's  Neurovascularly intact  Plan: cervical collar, recommend continuing ASA  No anticipated neurosurgical intervention at this time  Date: 08/22/22   Day 2: Overnight pt w/ hyporension to 70s/40s; given 500 mL bolus and 1 unit pRBC transfusion  Repeat bout of hypotension; given albumin and 500 mL bolus  Pt reports feeling improved today  On exam, bradycardic, wheezing, GCS 15  Remains on 3L O2  Plan: continue vista collar, neuro checks, continue home meds, analgeiscs, MAP > 65, Supplemental O2, weaned as tolerated; NPO for possible OR w/ OMFS, I&O, cardio-pulm monitoring, trend H&H, IV ABX      8/22 Surgery  Procedure: CLOSED REDUCTION NASAL FRACTURE (Bilateral)  Indication: Nasal bone fracture  Anesthesia: General  ASA Score: 3  Findings: Minimally displaced fracture of nasal bones         Wt Readings from Last 1 Encounters:   08/21/22 43 1 kg (95 lb 0 3 oz)     Vital Signs:   Date/Time Temp Pulse Resp BP MAP (mmHg) SpO2 FiO2 (%) Calculated FIO2 (%) - Nasal Cannula O2 Flow Rate (L/min) Nasal Cannula O2 Flow Rate (L/min) O2 Device   08/22/22 2300 -- -- -- -- -- 94 % -- -- -- -- --   08/22/22 22:14:45 98 1 °F (36 7 °C) 78 19 116/70 85 89 % Abnormal  -- -- -- -- --   08/22/22 2040 -- -- -- -- -- -- -- -- -- -- Face tent   08/22/22 2038 98 5 °F (36 9 °C) 86 20 146/114 Abnormal  -- 98 % -- -- -- -- --   08/22/22 2003 -- -- -- -- -- 96 % 60 -- 10 L/min -- Face tent   08/22/22 2000 -- 82 24 Abnormal  143/64 97 94 % -- -- -- -- --   08/22/22 1936 -- -- -- -- -- 94 % -- 52 -- 8 L/min Simple mask 08/22/22 1900 -- 74 22 136/72 92 97 % -- -- -- -- --   08/22/22 1800 -- 74 21 119/66 88 94 % -- -- -- -- --   08/22/22 1732 -- -- -- -- -- 97 % -- -- -- -- --   08/22/22 1730 -- 72 23 Abnormal  132/68 88 97 % -- -- -- -- --   08/22/22 1713 97 8 °F (36 6 °C) 80 28 Abnormal  133/66 94 93 % -- 52 -- 8 L/min Simple mask   08/22/22 1705 -- 76 27 Abnormal  119/61 90 94 % -- 52 -- 8 L/min Simple mask   08/22/22 1420 -- -- -- -- -- -- -- -- -- -- Nasal cannula   08/22/22 1400 -- 76 24 Abnormal  126/66 90 95 % -- 36 -- 4 L/min Nasal cannula   08/22/22 1330 -- 76 22 118/54 71 96 % -- 36 -- 4 L/min Nasal cannula   08/22/22 1300 -- 64 20 116/58 78 97 % -- 36 -- 4 L/min Nasal cannula   08/22/22 1200 97 7 °F (36 5 °C) 68 20 109/58 79 97 % -- 36 -- 4 L/min Nasal cannula   08/22/22 1100 -- 66 22 115/75 92 98 % -- -- -- -- --   08/22/22 1000 -- 66 20 134/64 93 91 % -- -- -- -- --       Date/Time Temp Pulse Resp BP MAP (mmHg) SpO2 Calculated FIO2 (%) - Nasal Cannula Nasal Cannula O2 Flow Rate (L/min) O2 Device   08/22/22 0908 97 6 °F (36 4 °C) 64 21 132/67 99 92 % 32 3 L/min Nasal cannula   08/22/22 0600 -- 54 Abnormal  17 101/52 70 98 % -- -- --   08/22/22 0530 -- 62 22 -- -- 97 % -- -- --   08/22/22 0500 -- 68 20 109/66 73 100 % -- -- --   08/22/22 0430 -- 68 21 110/66 80 100 % -- -- --   08/22/22 0400 97 8 °F (36 6 °C) 60 17 97/60 75 99 % 28 2 L/min Nasal cannula   08/22/22 0330 -- 60 17 102/69 74 99 % -- -- --   08/22/22 0300 -- 62 17 93/53 68 99 % -- -- --   08/22/22 0230 -- 60 17 80/49 Abnormal  59 Abnormal  97 % -- -- --   08/22/22 0200 -- 58 18 93/53 68 97 % -- -- --   08/22/22 0130 -- 64 17 94/59 71 96 % -- -- --   08/22/22 0100 -- 64 18 89/54 Abnormal  66 97 % -- -- --   08/22/22 0030 -- 66 18 97/55 70 97 % -- -- --   08/22/22 0000 -- 66 18 97/54 64 Abnormal  99 % 28 2 L/min Nasal cannula   08/21/22 2330 -- 72 21 112/73 88 98 % -- -- --   08/21/22 2300 -- 66 17 96/55 69 99 % -- -- --   08/21/22 2230 -- 64 17 85/53 Abnormal  63 Abnormal  99 % -- -- --   08/21/22 2200 -- 60 18 83/52 Abnormal  60 Abnormal  96 % -- -- --   08/21/22 2130 -- 60 17 74/54 Abnormal  59 Abnormal  97 % -- -- --   08/21/22 2100 -- 62 17 84/52 Abnormal  60 Abnormal  97 % -- -- --   08/21/22 2030 -- 58 19 74/49 Abnormal  56 Abnormal  97 % -- -- --   08/21/22 2000 98 °F (36 7 °C) 68 19 88/53 Abnormal  58 Abnormal  100 % -- -- None (Room air)   08/21/22 1946 -- -- -- -- -- 99 % -- -- --   08/21/22 1930 -- 58 17 68/43 Abnormal  51 Abnormal  97 % -- -- --   08/21/22 1900 -- 60 17 87/52 Abnormal  62 Abnormal  99 % -- -- --   08/21/22 1830 -- 60 17 80/49 Abnormal  64 Abnormal  99 % -- -- --   08/21/22 1820 97 7 °F (36 5 °C) -- -- -- -- -- -- -- --   08/21/22 1800 -- 68 22 113/64 88 98 % -- -- --   08/21/22 1730 -- 64 19 92/59 73 99 % -- -- --   08/21/22 1724 98 °F (36 7 °C) -- -- -- -- -- -- -- --   08/21/22 1713 -- 78 20 92/64 72 100 % -- -- --   08/21/22 1703 98 1 °F (36 7 °C) 61 18 78/45 Abnormal  -- -- -- -- --   08/21/22 1700 -- 66 26 Abnormal  78/45 Abnormal  56 Abnormal  100 % -- -- --   08/21/22 1654 -- 60 24 Abnormal  76/48 Abnormal  56 Abnormal  99 % -- -- --   08/21/22 1638 -- 58 19 61/33 Abnormal  40 Abnormal  98 % -- -- --   08/21/22 1630 -- 58 15 73/39 Abnormal  54 Abnormal  98 % -- -- --   08/21/22 1600 -- 60 16 81/47 Abnormal  57 Abnormal  98 % -- -- --   08/21/22 1500 -- 58 17 108/47 Abnormal  67 99 % -- -- --   08/21/22 1400 -- 60 17 103/53 67 100 % -- -- --   08/21/22 1300 -- 58 13 106/55 72 100 % -- -- --   08/21/22 1200 97 1 °F (36 2 °C) 62 18 97/52 70 100 % -- -- --   08/21/22 1109 -- 58 15 92/51 65 100 % -- -- --   08/21/22 1044 -- 60 18 -- -- 99 % -- -- --   08/21/22 1030 -- 64 26 Abnormal  165/71 97 99 % -- -- --   08/21/22 1015 -- 68 20 92/56 69 100 % -- -- --   08/21/22 1000 97 5 °F (36 4 °C) 65 22 100/53 -- 93 % -- -- Nasal cannula       Pertinent Labs/Diagnostic Test Results:   XR chest portable   Final Result by Jose Patricia, MD (08/21 1246)      Emphysematous changes are noted  No focal consolidation, pleural effusion, or pneumothorax  Workstation performed: [id number]         XR spine cervical 2 or 3 vw injury    (Results Pending)       Results from last 7 days   Lab Units 08/22/22  0541 08/21/22  1942 08/21/22  1452 08/21/22  0316   WBC Thousand/uL 5 42  --   --  9 36   HEMOGLOBIN g/dL 9 8*  9 7* 8 8* 7 1* 7 7*   HEMATOCRIT % 31 9*  31 5* 28 8* 24 2* 25 5*   PLATELETS Thousands/uL 135*  --   --  169   NEUTROS ABS Thousands/µL 1 94  --   --  6 12         Results from last 7 days   Lab Units 08/22/22  0541 08/21/22  1452 08/21/22  1111 08/21/22  0316   SODIUM mmol/L 136  --   --  139   POTASSIUM mmol/L 4 5  --   --  4 4   CHLORIDE mmol/L 103  --   --  105   CO2 mmol/L 31  --   --  29   ANION GAP mmol/L 2*  --   --  5   BUN mg/dL 13  --   --  20   CREATININE mg/dL 0 78  --   --  0 84   EGFR ml/min/1 73sq m 81  --   --  74   CALCIUM mg/dL 8 5  --   --  7 4*   CALCIUM, IONIZED mmol/L  --   --  1 14  --    MAGNESIUM mg/dL 2 6 1 7  --   --    PHOSPHORUS mg/dL 3 6 4 2*  --   --      Results from last 7 days   Lab Units 08/22/22  0541 08/21/22  0316   AST U/L 43 51*   ALT U/L 14 15   ALK PHOS U/L 135* 108   TOTAL PROTEIN g/dL 6 1* 5 2*   ALBUMIN g/dL 2 7* 2 4*   TOTAL BILIRUBIN mg/dL 2 48* 0 56         Results from last 7 days   Lab Units 08/22/22  0541 08/21/22  0316   GLUCOSE RANDOM mg/dL 102 121     Results from last 7 days   Lab Units 08/21/22  0316   PROTIME seconds 16 9*   INR  1 36*   PTT seconds 33       Past Medical History:   Diagnosis Date    Basedow's disease     Disease of thyroid gland     DVT (deep venous thrombosis) (HCC)     MGUS (monoclonal gammopathy of unknown significance)     Pulmonary emphysema (HCC)        Admitting Diagnosis: Nasal bone fracture [S02  2XXA]  Closed nondisplaced fracture of second cervical vertebra, unspecified fracture morphology, [facility] [S12 101A]  Age/Sex: 57 y o  female  Admission Orders:  NPO  Cervical spine precautions  Fall precautions  Sinus precautions  Cardio-Pulm monitoring  DW  I&O  SCDs   q1h neuro & neurovascular checks  H&H q8h  Scheduled Medications:  acetaminophen, 975 mg, Oral, Q8H [address]  acetylcysteine, 3 mL, Nebulization, TID  ampicillin-sulbactam, 3 g, Intravenous, Q6H  aspirin, 81 mg, Oral, Daily  carbidopa-levodopa, 2 tablet, Oral, 4x Daily   And  entacapone, 200 mg, Oral, 4x Daily  enoxaparin, 30 mg, Subcutaneous, Q12H NENA  gabapentin, 300 mg, Oral, TID  levalbuterol, 1 25 mg, Nebulization, TID  lidocaine, 1 patch, Topical, Daily  nicotine, 14 mg, Transdermal, Daily  PARoxetine, 20 mg, Oral, Daily  propranolol, 160 mg, Oral, Daily  senna-docusate sodium, 2 tablet, Oral, BID    Continuous IV Infusions:    none    PRN Meds:  albumin human 5 %, 25 g, Intravenous; 8/21 x1  albuterol, 2 5 mg, Nebulization, Q6H PRN; 8/22 x1  HYDROmorphone, 0 2 mg, Intravenous, Q4H PRN  magnesium sulfate, 2 g, Intravenous; 8/21 x1  morphine injection, 2 mg, Intravenous, Q3H PRN  multi-electrolyte, 500 mL Bolus, Intravenous; 8/21 x1, 8/22 x1  ondansetron, 4 mg, Intravenous, Q4H PRN; 8/22 x1  oxyCODONE, 2 5 mg, Oral, Q4H PRN  oxyCODONE, 5 mg, Oral, Q4H PRN        IP CONSULT TO NEUROSURGERY  IP CONSULT TO CASE MANAGEMENT  IP CONSULT TO ORAL AND MAXILLOFACIAL SURGERY  IP CONSULT TO CASE MANAGEMENT    Network Utilization Review Department  ATTENTION: Please call with any questions or concerns to [phone] and carefully listen to the prompts so that you are directed to the right person  All voicemails are confidential   Sudie Crigler all requests for admission clinical reviews, approved or denied determinations and any other requests to dedicated fax number below belonging to the campus where the patient is receiving treatment   List of dedicated fax numbers for the Facilities:  FACILITY NAME JAREK Newsome 25 DENIALS (Administrative/Medical Necessity) [phone] [address] (Maternity/NICU/Pediatrics) 261 [facility] 40 125 [facility]  [phone]   [address] [facility] [place] [address] [address] [place] [address] Mike Sara Zeng [address] [facility] [address] [facility] [phone]

## 2022-08-22 NOTE — CASE MANAGEMENT
Case Management Assessment & Discharge Planning Note    Patient name Christiano Hogue  Location ICU 13/ICU 15 MRN [id number]  : 1960 Date 2022       Current Admission Date: 2022  Current Admission Diagnosis:C2 cervical fracture [facility])   Patient Active Problem List    Diagnosis Date Noted    Acute blood loss anemia 2022    Parkinson disease ([facility] ) 2022    C2 cervical fracture ([facility] ) 2022    [facility] 20[facility]    Nasal fracture 2022    Unspecified injury of left vertebral artery, initial encounter 2022    Scalp laceration, initial encounter 2022    Fall 2022    D[facility] venous thrombosis) ([facility] ) 2022    Pulmonary emphysema (HCC)     MGUS (monoclonal gammopathy of unknown significance)     Basedow's disease       LOS (days): 1  Geometric Mean LOS (GMLOS) (days):   Days to GMLOS:     OBJECTIVE:    Risk of Unplanned Readmission Score: 13 66         Current admission status: Inpatient       Preferred Pharmacy:   [facility] [address] [place] [address]  Phone: [phone] Fax: [phone]    Primary Care Provider: No primary care provider on file  Primary Insurance: [employer]  Secondary Insurance:     ASSESSMENT:  Active Health Care Proxies    There are no active Health Care Proxies on file         Advance Directives  Does patient have a [address] [place] [address] Avenue?: No  Was patient offered paperwork?: Yes  Does patient currently have a Health Care decision maker?: Yes, please see Health Care Proxy section  Does patient have Advance Directives?: No  Was patient offered paperwork?: Yes  Primary Contact: Gila Macdonald (Spouse)   [phone]    Readmission Root Cause  30 Day Readmission: No    Patient Information  Admitted from[de-identified] Home  Mental Status: Alert  During Assessment patient was accompanied by: Not accompanied during assessment  Assessment information provided by[de-identified] Patient  Primary Caregiver: Self  Support Systems: Self, Spouse/significant other, Family members  [place] of Residence: [address] do you live in?: [facility] entry access options   Select all that apply : Stairs  Number of steps to enter home : 5  Do the steps have railings?: Yes  Type of Current Residence: 2 story home  Upon entering residence, is there a bedroom on the main floor (no further steps)?: No  A bedroom is located on the following floor levels of residence (select all that apply):: 2nd Floor  Upon entering residence, is there a bathroom on the main floor (no further steps)?: Yes  Number of steps to 2nd floor from main floor: One Flight  In the last 12 months, was there a time when you were not able to pay the mortgage or rent on time?: No  In the last 12 months, how many places have you lived?: 1  In the last 12 months, was there a time when you did not have a steady place to sleep or slept in a shelter (including now)?: No  Homeless/housing insecurity resource given?: N/A  Living Arrangements: Lives w/ Spouse/significant other  Is patient a ?: No    Activities of Daily Living Prior to Admission  Functional Status: Independent  Completes ADLs independently?: Yes  Ambulates independently?: Yes  Does patient use assisted devices?: No  Does patient currently own DME?: No  Does patient have a history of Outpatient Therapy (PT/OT)?: No  Does the patient have a history of Short-Term Rehab?: No  Does patient have a history of HHC?: No  Does patient currently have [facility]?: No    Patient Information Continued  Income Source: SSI/SSD  Does patient have prescription coverage?: Yes  Within the past 12 months, you worried that your food would run out before you got the money to buy more : Never true  Within the past 12 months, the food you bought just didn't last and you didn't have money to get more : Never true  Food insecurity resource given?: N/A  Does patient receive dialysis treatments?: No  Does patient have a history of substance abuse?: No  Does patient have a history of Mental Health Diagnosis?: No    Means of Transportation  Means of Transport to Appts[de-identified] Family transport  In the past 12 months, has lack of transportation kept you from medical appointments or from getting medications?: No  In the past 12 months, has lack of transportation kept you from meetings, work, or from getting things needed for daily living?: No  Was application for public transport provided?: N/A    DISCHARGE DETAILS:    Discharge planning discussed with[de-identified] patient  Freedom of Choice: Yes     CM contacted family/caregiver?: Yes  Were Treatment Team discharge recommendations reviewed with patient/caregiver?: Yes  Did patient/caregiver verbalize understanding of patient care needs?: Yes  Were patient/caregiver advised of the risks associated with not following Treatment Team discharge recommendations?: Yes    Contacts  Patient Contacts: Karo Deal (Spouse) [phone]  Relationship to Patient[de-identified] Family  Contact Method: Phone  Phone Number: [phone]  Reason/Outcome: Continuity of Care, Emergency Contact, Discharge Planning    Cm met with pt to discuss the role of CM  Pt lives with her spouse (works as ), and her sister (doesn't work), in a 2 story home which has 5STE from the front, 1STE from the back and 12-13 steps inside  Pt has a 1st floor tub/shower with standard toilet  Pt's bedroom and main bathroom (walk-in shower with standard toilet) are on the 2nd floor  Pt doesn't drive, is on SSD 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, and reports being fully independent for all ADL/IADLs  Pt's had 3 falls in the last 6 months  Pt ambulates independently and owns no DME  Pt enjoys playing games on her tablet  Pt uses [facility] [place]  Pt doesn't have a living will  Pt has no documented hx of mental health, substance abuse, IP rehab, or VNA     CM will appreciate therapy recommendations when appropriate  Pt has 2 pfizer COVID vaccinations:  Dose 1  05/07/2021 (COVID-19 PFIZER VACCINE 0 3 ML IM)      Dose 2  06/01/2021 (COVID-19 PFIZER VACCINE 0 3 ML IM)       CM reviewed d/c planning process including the following: identifying help at home, patient preference for d/c planning needs, Discharge Lounge, [facility] Meds to Bed program, availability of treatment team to discuss questions or concerns patient and/or family may have regarding understanding medications and recognizing signs and symptoms once discharged  CM also encouraged patient to follow up with all recommended appointments after discharge  Patient advised of importance for patient and family to participate in managing patients medical well being

## 2022-08-22 NOTE — OP NOTE
OPERATIVE REPORT  PATIENT NAME: Roxana Acosta    :  1960  MRN: [id number]  Pt Location: [facility] OR ROOM 03    SURGERY DATE: 2022    Surgeon(s) and Role:     * Mak Morley DMD - Primary    Preop Diagnosis:  * No pre-op diagnosis entered *    * No Diagnosis Codes entered *    Procedure(s) (LRB):  CLOSED REDUCTION NASAL FRACTURE (Bilateral)    Specimen(s):  * No specimens in log *    Estimated Blood Loss:   Minimal    Drains:  * No LDAs found *    Anesthesia Type:   General    Operative Indications:  * No pre-op diagnosis entered *  Nasal bone fracture    Operative Findings:  Minimally displaced fracture of nasal bones  Complications:   None    Procedure and Technique:  The patient was greeted in the preoperative holding area by the oral and maxillofacial surgery team as well as anesthesia teams  All the risks benefits of the procedure were explained detail all questions have been answered consent had already been signed  Care was then handed back to anesthesia team where the patient was brought into the operating room placed in the supine position where he remained for the rest of the case  Anesthesia was able to establish an orotracheal intubation without any complications  Care was then handed back to the OMFS team     Patient was prepped and draped in a sterile manner timeout was performed in which the patient was correctly identified by name medical record number as well as to site of the procedure to be performed  Patient had received preoperative Decadron was given and Unasyn 3grams antibiotics  Once a timeout was completed patient was given local anesthesia with 1% lidocaine 1-100,000 epinephrine at the sites of bilateral V2 blocks as well as locally at the site of the nasal bones and intra-nasally within the mucosa  Cocaine-soaked-soaked neuro-patties were placed in bilateral nares to aid in hemostasis   Neuro patties were then removed nasal speculum was used to do an intranasal exam  There is no abnormal masses or lacerations noticed  At this point [facility] elevator was used to reduce the deviated nasal bones with good symmetry achieved  Once the nasal bones were reduced speculum was used to inspect bilateral nares found that the patient was hemostatic  Gelfoam soaked in bacitracin was used internal nasal packing  Next benzoin ointment was placed on the nasal dorsum followed by external nasal splint  Care was then handed back to the anesthesia team where the patient was extubated in the operating room without any complications and transferred to the postanesthesia care unit       I was present for the entire procedure    Patient Disposition:  PACU  and extubated and stable      SIGNATURE: Mak Morley DMD  DATE: August 22, 2022  TIME: 4:26 PM

## 2022-08-22 NOTE — PLAN OF CARE
Problem: OCCUPATIONAL THERAPY ADULT  Goal: Performs self-care activities at highest level of function for planned discharge setting  See evaluation for individualized goals  Description: Treatment Interventions: ADL retraining, Functional transfer training, UE strengthening/ROM, Endurance training, Patient/family training, Equipment evaluation/education, Compensatory technique education, Continued evaluation, Energy conservation, Activityengagement          See flowsheet documentation for full assessment, interventions and recommendations  Note: Limitation: Decreased ADL status, Decreased Safe judgement during ADL, Decreased endurance, Decreased high-level ADLs, Decreased self-care trans  Prognosis: Fair  Assessment: Pt is a 58 y/o female seen for OT eval s/p adm to [facility] as a transfer from [facility] where she presented after experiencing a mechanical fall from standing height and striking her head on the shower  Pt found to have C2, C3 fractures, nasal bone fractures and vertebral artery occlusion  Pt has C/S collar w/ active spinal precautions; non-op from [facility]  Pt is pending OMFS surgical intervention; maintained sinus precautions and HOB @ 30 degrees  Pt  has a past medical history of Basedow's disease, Disease of thyroid gland, DVT (deep venous thrombosis) ([facility] [address] ), MGUS (monoclonal gammopathy of unknown significance), and Pulmonary emphysema ([facility] [address] )  Pt with active OT orders and up with assistance  orders  Pt lives with her spouse and sister in 2 [place], 5 [place] w/ HR, 2nd floor bed/bath, full bath available on 1st  Pt was I w/  ADLS and IADLS, does not drive (sister transports), & required no use of DME PTA  Pt is currently demonstrating the following occupational deficits: S UB ADLS, Min A LB ADLS, Mod A bed mobility, transfers and functional mobility w/ HHA   These deficits that are impacting pt's baseline areas of occupation are a result of the following impairments: endurance, activity tolerance, functional mobility, forward functional reach, balance, trunk control, functional standing tolerance, decreased I w/ ADLS/IADLS, decreased safety awareness and decreased insight into deficits  The following Occupational Performance Areas to address include: bathing/shower, toilet hygiene, dressing, health maintenance, functional mobility, clothing management and household maintenance  Recommend inpatient rehab  upon D/C   Pt to continue to benefit from acute immediate OT services to address the following goals 3-5x/week to  w/in 10-14 days:  Recommendation: (S) Physiatry Consult  OT Discharge Recommendation: Post acute rehabilitation services     Prasanna Toro MS, OTR/L

## 2022-08-22 NOTE — PLAN OF CARE
Problem: MOBILITY - ADULT  Goal: Maintain or return to baseline ADL function  Description: INTERVENTIONS:  -  Assess patient's ability to carry out ADLs; assess patient's baseline for ADL function and identify physical deficits which impact ability to perform ADLs (bathing, care of mouth/teeth, toileting, grooming, dressing, etc )  - Assess/evaluate cause of self-care deficits   - Assess range of motion  - Assess patient's mobility; develop plan if impaired  - Assess patient's need for assistive devices and provide as appropriate  - Encourage maximum independence but intervene and supervise when necessary  - Involve family in performance of ADLs  - Assess for home care needs following discharge   - Consider OT consult to assist with ADL evaluation and planning for discharge  - Provide patient education as appropriate  Outcome: Progressing  Goal: Maintains/Returns to pre admission functional level  Description: INTERVENTIONS:  - Perform BMAT or MOVE assessment daily    - Set and communicate daily mobility goal to care team and patient/family/caregiver  - Collaborate with rehabilitation services on mobility goals if consulted  - Perform Range of Motion 6 times a day  - Reposition patient every 2 hours    - Dangle patient 2 times a day  - Stand patient 2 times a day  - Ambulate patient 2 times a day  - Out of bed to chair 2 times a day   - Out of bed for meals 2 times a day  - Out of bed for toileting  - Record patient progress and toleration of activity level   Outcome: Progressing     Problem: PAIN - ADULT  Goal: Verbalizes/displays adequate comfort level or baseline comfort level  Description: Interventions:  - Encourage patient to monitor pain and request assistance  - Assess pain using appropriate pain scale  - Administer analgesics based on type and severity of pain and evaluate response  - Implement non-pharmacological measures as appropriate and evaluate response  - Consider cultural and social influences on pain and pain management  - Notify physician/advanced practitioner if interventions unsuccessful or patient reports new pain  Outcome: Progressing     Problem: INFECTION - ADULT  Goal: Absence or prevention of progression during hospitalization  Description: INTERVENTIONS:  - Assess and monitor for signs and symptoms of infection  - Monitor lab/diagnostic results  - Monitor all insertion sites, i e  indwelling lines, tubes, and drains  - Monitor endotracheal if appropriate and nasal secretions for changes in amount and color  - [place] appropriate cooling/warming therapies per order  - Administer medications as ordered  - Instruct and encourage patient and family to use good hand hygiene technique  - Identify and instruct in appropriate isolation precautions for identified infection/condition  Outcome: Progressing     Problem: SAFETY ADULT  Goal: Maintain or return to baseline ADL function  Description: INTERVENTIONS:  -  Assess patient's ability to carry out ADLs; assess patient's baseline for ADL function and identify physical deficits which impact ability to perform ADLs (bathing, care of mouth/teeth, toileting, grooming, dressing, etc )  - Assess/evaluate cause of self-care deficits   - Assess range of motion  - Assess patient's mobility; develop plan if impaired  - Assess patient's need for assistive devices and provide as appropriate  - Encourage maximum independence but intervene and supervise when necessary  - Involve family in performance of ADLs  - Assess for home care needs following discharge   - Consider OT consult to assist with ADL evaluation and planning for discharge  - Provide patient education as appropriate  Outcome: Progressing  Goal: Maintains/Returns to pre admission functional level  Description: INTERVENTIONS:  - Perform BMAT or MOVE assessment daily    - Set and communicate daily mobility goal to care team and patient/family/caregiver     - Collaborate with rehabilitation services on mobility goals if consulted  - Perform Range of Motion 6 times a day  - Reposition patient every 2 hours    - Dangle patient 2 times a day  - Stand patient 2 times a day  - Ambulate patient 2 times a day  - Out of bed to chair 2 times a day   - Out of bed for meals 2 times a day  - Out of bed for toileting  - Record patient progress and toleration of activity level   Outcome: Progressing  Goal: Patient will remain free of falls  Description: INTERVENTIONS:  - Educate patient/family on patient safety including physical limitations  - Instruct patient to call for assistance with activity   - Consult OT/PT to assist with strengthening/mobility   - Keep Call bell within reach  - Keep bed low and locked with side rails adjusted as appropriate  - Keep care items and personal belongings within reach  - Initiate and maintain comfort rounds  - Make Fall Risk Sign visible to staff  - Offer Toileting every 2 Hours, in advance of need  - Initiate/Maintain Bed/Chair alarm  - Obtain necessary fall risk management equipment  - Apply yellow socks and bracelet for high fall risk patients  - Consider moving patient to room near nurses station  Outcome: Progressing     Problem: DISCHARGE PLANNING  Goal: Discharge to home or other facility with appropriate resources  Description: INTERVENTIONS:  - Identify barriers to discharge w/patient and caregiver  - Arrange for needed discharge resources and transportation as appropriate  - Identify discharge learning needs (meds, wound care, etc )  - Arrange for interpretive services to assist at discharge as needed  - Refer to Case Management Department for coordinating discharge planning if the patient needs post-hospital services based on physician/advanced practitioner order or complex needs related to functional status, cognitive ability, or social support system  Outcome: Progressing     Problem: Knowledge Deficit  Goal: Patient/family/caregiver demonstrates understanding of disease process, treatment plan, medications, and discharge instructions  Description: Complete learning assessment and assess knowledge base  Interventions:  - Provide teaching at level of understanding  - Provide teaching via preferred learning methods  Outcome: Progressing     Problem: Nutrition/Hydration-ADULT  Goal: Nutrient/Hydration intake appropriate for improving, restoring or maintaining nutritional needs  Description: Monitor and assess patient's nutrition/hydration status for malnutrition  Collaborate with interdisciplinary team and initiate plan and interventions as ordered  Monitor patient's weight and dietary intake as ordered or per policy  Utilize nutrition screening tool and intervene as necessary  Determine patient's food preferences and provide high-protein, high-caloric foods as appropriate       INTERVENTIONS:  - Monitor oral intake, urinary output, labs, and treatment plans  - Assess nutrition and hydration status and recommend course of action  - Evaluate amount of meals eaten  - Assist patient with eating if necessary   - Allow adequate time for meals  - Recommend/ encourage appropriate diets, oral nutritional supplements, and vitamin/mineral supplements  - Order, calculate, and assess calorie counts as needed  - Recommend, monitor, and adjust tube feedings and TPN/PPN based on assessed needs  - Assess need for intravenous fluids  - Provide specific nutrition/hydration education as appropriate  - Include patient/family/caregiver in decisions related to nutrition  Outcome: Progressing     Problem: Prexisting or High Potential for Compromised Skin Integrity  Goal: Skin integrity is maintained or improved  Description: INTERVENTIONS:  - Identify patients at risk for skin breakdown  - Assess and monitor skin integrity  - Assess and monitor nutrition and hydration status  - Monitor labs   - Assess for incontinence   - Turn and reposition patient  - Assist with mobility/ambulation  - Relieve pressure over bony prominences  - Avoid friction and shearing  - Provide appropriate hygiene as needed including keeping skin clean and dry  - Evaluate need for skin moisturizer/barrier cream  - Collaborate with interdisciplinary team   - Patient/family teaching  - Consider wound care consult   Outcome: Progressing     Problem: Potential for Falls  Goal: Patient will remain free of falls  Description: INTERVENTIONS:  - Educate patient/family on patient safety including physical limitations  - Instruct patient to call for assistance with activity   - Consult OT/PT to assist with strengthening/mobility   - Keep Call bell within reach  - Keep bed low and locked with side rails adjusted as appropriate  - Keep care items and personal belongings within reach  - Initiate and maintain comfort rounds  - Make Fall Risk Sign visible to staff  - Offer Toileting every 2 Hours, in advance of need  - Initiate/Maintain Bed/Chair alarm  - Obtain necessary fall risk management equipment  - Apply yellow socks and bracelet for high fall risk patients  - Consider moving patient to room near nurses station  Outcome: Progressing     Problem: NEUROSENSORY - ADULT  Goal: Achieves stable or improved neurological status  Description: INTERVENTIONS  - Monitor and report changes in neurological status  - Monitor vital signs such as temperature, blood pressure, glucose, and any other labs ordered   - Initiate measures to prevent increased intracranial pressure  - Monitor for seizure activity and implement precautions if appropriate      Outcome: Progressing     Problem: CARDIOVASCULAR - ADULT  Goal: Maintains optimal cardiac output and hemodynamic stability  Description: INTERVENTIONS:  - Monitor I/O, vital signs and rhythm  - Monitor for S/S and trends of decreased cardiac output  - Administer and titrate ordered vasoactive medications to optimize hemodynamic stability  - Assess quality of pulses, skin color and temperature  - Assess for signs of decreased coronary artery perfusion  - Instruct patient to report change in severity of symptoms  Outcome: Progressing  Goal: Absence of cardiac dysrhythmias or at baseline rhythm  Description: INTERVENTIONS:  - Continuous cardiac monitoring, vital signs, obtain 12 lead EKG if ordered  - Administer antiarrhythmic and heart rate control medications as ordered  - Monitor electrolytes and administer replacement therapy as ordered  Outcome: Progressing     Problem: RESPIRATORY - ADULT  Goal: Achieves optimal ventilation and oxygenation  Description: INTERVENTIONS:  - Assess for changes in respiratory status  - Assess for changes in mentation and behavior  - Position to facilitate oxygenation and minimize respiratory effort  - Oxygen administered by appropriate delivery if ordered  - Initiate smoking cessation education as indicated  - Encourage broncho-pulmonary hygiene including cough, deep breathe, Incentive Spirometry  - Assess the need for suctioning and aspirate as needed  - Assess and instruct to report SOB or any respiratory difficulty  - Respiratory Therapy support as indicated  Outcome: Progressing     Problem: GASTROINTESTINAL - ADULT  Goal: Minimal or absence of nausea and/or vomiting  Description: INTERVENTIONS:  - Administer IV fluids if ordered to ensure adequate hydration  - Maintain NPO status until nausea and vomiting are resolved  - Nasogastric tube if ordered  - Administer ordered antiemetic medications as needed  - Provide nonpharmacologic comfort measures as appropriate  - Advance diet as tolerated, if ordered  - Consider nutrition services referral to assist patient with adequate nutrition and appropriate food choices  Outcome: Progressing  Goal: Maintains or returns to baseline bowel function  Description: INTERVENTIONS:  - Assess bowel function  - Encourage oral fluids to ensure adequate hydration  - Administer IV fluids if ordered to ensure adequate hydration  - Administer ordered medications as needed  - Encourage mobilization and activity  - Consider nutritional services referral to assist patient with adequate nutrition and appropriate food choices  Outcome: Progressing  Goal: Maintains adequate nutritional intake  Description: INTERVENTIONS:  - Monitor percentage of each meal consumed  - Identify factors contributing to decreased intake, treat as appropriate  - Assist with meals as needed  - Monitor I&O, weight, and lab values if indicated  - Obtain nutrition services referral as needed  Outcome: Progressing     Problem: GENITOURINARY - ADULT  Goal: Maintains or returns to baseline urinary function  Description: INTERVENTIONS:  - Assess urinary function  - Encourage oral fluids to ensure adequate hydration if ordered  - Administer IV fluids as ordered to ensure adequate hydration  - Administer ordered medications as needed  - Offer frequent toileting  - Follow urinary retention protocol if ordered  Outcome: Progressing     Problem: METABOLIC, FLUID AND ELECTROLYTES - ADULT  Goal: Electrolytes maintained within normal limits  Description: INTERVENTIONS:  - Monitor labs and assess patient for signs and symptoms of electrolyte imbalances  - Administer electrolyte replacement as ordered  - Monitor response to electrolyte replacements, including repeat lab results as appropriate  - Instruct patient on fluid and nutrition as appropriate  Outcome: Progressing  Goal: Fluid balance maintained  Description: INTERVENTIONS:  - Monitor labs   - Monitor I/O and WT  - Instruct patient on fluid and nutrition as appropriate  - Assess for signs & symptoms of volume excess or deficit  Outcome: Progressing     Problem: SKIN/TISSUE INTEGRITY - ADULT  Goal: Skin Integrity remains intact(Skin Breakdown Prevention)  Description: Assess:  -Perform Nate assessment every shift  -Clean and moisturize skin every shift  -Inspect skin when repositioning, toileting, and assisting with ADLS  -Assess under medical devices every shift and PRN  -Assess extremities for adequate circulation and sensation     Bed Management:  -Have minimal linens on bed & keep smooth, unwrinkled  -Change linens as needed when moist or perspiring  -Avoid sitting or lying in one position for more than 2 hours while in bed  -Keep HOB at 30 degrees     Toileting:  -Offer bedside commode  -Assess for incontinence every 2 hours  -Use incontinent care products after each incontinent episode     Activity:  -Mobilize patient 2 times a day  -Encourage activity and walks on unit  -Encourage or provide ROM exercises   -Turn and reposition patient every 2 Hours  -Use appropriate equipment to lift or move patient in bed  -Instruct/ Assist with weight shifting every 30 minutes when out of bed in chair  -Consider limitation of chair time 4 hour intervals    Skin Care:  -Avoid use of baby powder, tape, friction and shearing, hot water or constrictive clothing  -Relieve pressure over bony prominences   -Do not massage red bony areas    Next Steps:  -Teach patient strategies to minimize risks    -Consider consults to  interdisciplinary teams   Outcome: Progressing  Goal: Incision(s), wounds(s) or drain site(s) healing without S/S of infection  Description: INTERVENTIONS  - Assess and document dressing, incision, wound bed, drain sites and surrounding tissue  - Provide patient and family education  - Perform skin care/dressing changes every shift and PRN  Outcome: Progressing     Problem: HEMATOLOGIC - ADULT  Goal: Maintains hematologic stability  Description: INTERVENTIONS  - Assess for signs and symptoms of bleeding or hemorrhage  - Monitor labs  - Administer supportive blood products/factors as ordered and appropriate  Outcome: Progressing     Problem: MUSCULOSKELETAL - ADULT  Goal: Maintain or return mobility to safest level of function  Description: INTERVENTIONS:  - Assess patient's ability to carry out ADLs; assess patient's baseline for ADL function and identify physical deficits which impact ability to perform ADLs (bathing, care of mouth/teeth, toileting, grooming, dressing, etc )  - Assess/evaluate cause of self-care deficits   - Assess range of motion  - Assess patient's mobility  - Assess patient's need for assistive devices and provide as appropriate  - Encourage maximum independence but intervene and supervise when necessary  - Involve family in performance of ADLs  - Assess for home care needs following discharge   - Consider OT consult to assist with ADL evaluation and planning for discharge  - Provide patient education as appropriate  Outcome: Progressing     Problem: COPING  Goal: Pt/Family able to verbalize concerns and demonstrate effective coping strategies  Description: INTERVENTIONS:  - Assist patient/family to identify coping skills, available support systems and cultural and spiritual values  - Provide emotional support, including active listening and acknowledgement of concerns of patient and caregivers  - Reduce environmental stimuli, as able  - Provide patient education  - Assess for spiritual pain/suffering and initiate spiritual care, including notification of Pastoral Care or [place] based community as needed  - Assess effectiveness of coping strategies  Outcome: Progressing  Goal: Will report anxiety at manageable levels  Description: INTERVENTIONS:  - Administer medication as ordered  - Teach and encourage coping skills  - Provide emotional support  - Assess patient/family for anxiety and ability to cope  Outcome: Progressing

## 2022-08-22 NOTE — PROGRESS NOTES
[address] [facility]  Progress Note Manuel Mcmillan 1960, 57 y o  female MRN: [id number]  Unit/Bed#: ICU 13 Encounter: [id number]  Primary Care Provider: No primary care provider on file  Date and time admitted to hospital: 8/21/2022  9:49 AM    * C2 cervical fracture [facility])  Assessment & Plan  Oblique fracture of the anterior inferior aspect of C2 as well as oblique fracture of the left transverse process of C3   · Status post fall while in bathroom, patient falling frequently lately  History of Parkinson's  · History of previous anterior cervical diskectomy and fixation fusion at [facility]  Unfortunately postoperatively patient developed DVT and has been maintained on Eliquis  And patient currently on aspirin  · Patient also sustained nasal fracture    Imaging:    CT cervical spine wo 08/21/2022: Oblique fracture of the anteroinferior aspect of C2 (sagittal image 57, series 604)  Oblique fracture of the left transverse process of C3 (axial image 62, series 5)  Spinal alignment appears maintained  Degenerative changes as described  Old fracture of the left clavicle, pulmonary emphysematous changes, and other findings as above  CTA neck w/wo 8/22/22:No vascular enhancement of left vertebral artery in the neck  However, in this patient with no acute/persistent neurologic symptoms, the appearance is most suggestive of anatomic variation rather than acute traumatic left vertebral artery injury, specifically as there is no enhancement even of left vertebral artery origin  There is enhancement of left vertebral artery and left posterior cerebellar artery above the level of foramen magnum presumably via collateral circulation across vertebral basilar junction  There are patent posterior communicating arteries bilaterally and the basilar artery is somewhat diminutive consistent with anatomic variation    Continued neurologic monitoring is recommended to exclude the development of neurologic symptoms  Atherosclerotic changes, more advanced stability expected for the patient's age but without flow-limiting atherosclerotic stenosis  Emphysematous changes in the visualized upper lung zones  Reidentification of cervical spine degenerative changes and no cervical spine fractures without malalignment  Cervical spine x-ray 08/21/2022:No change C2 reported fracture  C3 left transverse process fracture not appreciated radiographically  Plan:  · Continue neurological checks  · Reviewed imaging with patient  · Ordered [place] collar to be worn at all times, okay for [place] collar for showering  · Medical management and pain control per primary team  · Mobilize with PT/OT with collar  · No neurosurgical intervention warranted  · DVT PPX: SCDs and SQ Lovenox    Neurosurgery will sign off, patient will follow-up in 2 weeks with repeat cervical spine x-rays, call with any further questions or concerns  Unspecified injury of left vertebral artery, initial encounter  Assessment & Plan  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    Plan:  · Continue home aspirin  · See above plan    Nasal fracture  Assessment & Plan  OMS following, input appreciated  Patient currently on Unasyn    C3 cervical fracture [facility])  Assessment & Plan  See above plan    Subjective/Objective     Chief Complaint: "I am okay"    Subjective:  Patient states she vomited once this morning  She denies any neck pain even with movement  She has no tenderness to palpation of her cervical spine  She denies any headaches, dizziness, blurry vision, chest pain, shortness of breath, abdominal pain, diarrhea, no problems with bowel or bladder, no new weakness or numbness/tingling  Objective:  Patient comfortably sitting out in recliner with Aspen collar in place, NAD  I/O       08/20 0701 08/21 0700 08/21 0701 08/22 0700 08/22 0701 08/23 0700    P  O   200     I V  (mL/kg)  1090 (25 3)     Blood 449 2     IV Piggyback  500     Total Intake(mL/kg)  2239 2 (52)     Urine (mL/kg/hr)  700     Total Output  700     Net  +1539 2                  Invasive Devices  Report    Peripheral Intravenous Line  Duration           Peripheral IV 08/21/22 Left;Proximal;Ventral (anterior) Forearm 1 day    Peripheral IV 08/22/22 Right;Upper;Ventral (anterior) Arm <1 day                Physical Exam:  Vitals: Blood pressure 115/75, pulse 66, temperature 97 6 °F (36 4 °C), temperature source Oral, resp  rate 22, height 5' 1" (1 549 m), weight 43 1 kg (95 lb 0 3 oz), SpO2 98 %  ,Body mass index is 17 95 kg/m²  General appearance: alert, appears stated age, cooperative and no distress  Head: Normocephalic, without obvious abnormality, atraumatic  Eyes: EOMI, PERRL, conjugate gaze    Bilateral periorbital ecchymosis  Neck: supple, symmetrical, trachea midline, Aspen collar in place, no tenderness of palpation of cervical spine  Lungs: non labored breathing, on nasal cannula  Heart: regular heart rate  Neurologic:   Mental status: Alert, oriented x3, thought content appropriate, speech is clear, following commands  Cranial nerves: grossly intact (Cranial nerves II-XII)  Sensory: normal to light touch in all extremities x4, JPS intact  Motor: moving all extremities without focal weakness, upper extremity tremors noted  Reflexes: 2+ and symmetric, no Galvan's or clonus appreciated      Lab Results:  Results from last 7 days   Lab Units 08/22/22  0541 08/21/22  1942 08/21/22  1452 08/21/22  0316   WBC Thousand/uL 5 42  --   --  9 36   HEMOGLOBIN g/dL 9 8*  9 7* 8 8* 7 1* 7 7*   HEMATOCRIT % 31 9*  31 5* 28 8* 24 2* 25 5*   PLATELETS Thousands/uL 135*  --   --  169   NEUTROS PCT % 36*  --   --  65   MONOS PCT % 11  --   --  10     Results from last 7 days   Lab Units 08/22/22  0541 08/21/22  0316   POTASSIUM mmol/L 4 5 4 4   CHLORIDE mmol/L 103 105   CO2 mmol/L 31 29   BUN mg/dL 13 20   CREATININE mg/dL 0 78 0 84   CALCIUM mg/dL 8 5 7 4*   ALK PHOS U/L 135* 108   ALT U/L 14 15   AST U/L 43 51*     Results from last 7 days   Lab Units 08/22/22  0541 08/21/22  1452   MAGNESIUM mg/dL 2 6 1 7     Results from last 7 days   Lab Units 08/22/22  0541 08/21/22  1452   PHOSPHORUS mg/dL 3 6 4 2*     Results from last 7 days   Lab Units 08/21/22  0316   INR  1 36*   PTT seconds 33     No results found for: TROPONINT  ABG:No results found for: PHART, MJU5HXS, PO2ART, IIS1CAY, Z1HCMKTF, BEART, SOURCE    Imaging Studies: I have personally reviewed pertinent reports  and I have personally reviewed pertinent films in PACS    XR chest portable    Result Date: 8/21/2022  Impression: Emphysematous changes are noted  No focal consolidation, pleural effusion, or pneumothorax  Workstation performed: [id number]     XR spine cervical 2 or 3 vw injury    Result Date: 8/22/2022  Impression: No change C2 reported fracture  C3 left transverse process fracture not appreciated radiographically  Workstation performed: [id number]     CT head without contrast    Result Date: 8/21/2022  Impression: Large posterior vertex scalp hematoma/laceration, no calvarial fracture or acute intracranial abnormality is seen  Other findings as above  CT CERVICAL SPINE - WITHOUT CONTRAST INDICATION:   Head trauma, moderate-severe Fall, head injury, face injury, on Eliquis  COMPARISON:  None  TECHNIQUE:  CT examination of the cervical spine was performed without intravenous contrast   Contiguous axial images were obtained  Sagittal and coronal reconstructions were performed  Radiation dose length product (DLP) for this visit:  76 310 744 mGy-cm  (accession [id number]), 314 mGy-cm  (accession [id number]), 326 mGy-cm  (accession [id number])  This examination, like all CT scans performed in the [facility], was performed utilizing techniques to minimize radiation dose exposure, including the use of iterative reconstruction and automated exposure control  IMAGE QUALITY:  Diagnostic  FINDINGS: ALIGNMENT:  Normal alignment of the cervical spine  No subluxation  VERTEBRAL BODIES:  Oblique fracture of the anteroinferior aspect of C2 (sagittal image 57, series 604)  Oblique fracture of the left transverse process of C3 (axial image 62, series 5)  Visualized bones otherwise appear intact  Anterior plate and screw fusion of C4-C7  Hardware appears intact  DEGENERATIVE CHANGES:  Intervertebral disc space narrowing at C2/C3, C3/C4, and C7/T1 with anterior, marginal, and uncovertebral osteophytosis at these levels  Multilevel facet joint arthropathy  PREVERTEBRAL AND PARASPINAL SOFT TISSUES:  Grossly unremarkable THORACIC INLET:  Old fracture of the left clavicle  Moderate to severe pulmonary emphysematous changes  Mild atherosclerosis  IMPRESSION: Oblique fracture of the anteroinferior aspect of C2 (sagittal image 57, series 604)  Oblique fracture of the left transverse process of C3 (axial image 62, series 5)  Recommend CTA of the neck to exclude vascular injury on the left  Spinal alignment appears maintained  Degenerative changes as described  Old fracture of the left clavicle, pulmonary emphysematous changes, and other findings as above  CT FACIAL BONES WITHOUT INTRAVENOUS CONTRAST INDICATION:   Head trauma, moderate-severe Fall, head injury, face injury, on Eliquis  COMPARISON: None  TECHNIQUE:  Axial CT images were obtained through the facial bones with additional sagittal and coronal reconstructions  Radiation dose length product (DLP) for this visit:  76 310 744 mGy-cm  (accession [id number]), 314 mGy-cm  (accession [id number]), 326 mGy-cm  (accession [id number])  This examination, like all CT scans performed in the [facility], was performed utilizing techniques to minimize radiation dose exposure, including the use of iterative reconstruction and automated exposure control  IMAGE QUALITY:  Diagnostic  FINDINGS: FACIAL BONES:  Nasal bone fracture    Normal alignment of the temporomandibular joints  No suspicious appearing osseous lesion  ORBITS:  Orbital globes, optic nerves, and extraocular muscles appear symmetric and normal  There is no evidence of retrobulbar mass, abscess, or hematoma  SINUSES:  Opacification of several bilateral ethmoid air cells; otherwise grossly unremarkable  SOFT TISSUES:  Perinasal soft tissue swelling and a small amount of subcutaneous emphysema  IMPRESSION: Nasal bone fracture with associated perinasal soft tissue swelling  The orbits appear intact  Other findings as above  I personally discussed this study with Nghia Wheat on 8/21/2022 at 4:32 AM  Workstation performed: [id number]     CT facial bones wo contrast    Result Date: 8/21/2022  Impression: Large posterior vertex scalp hematoma/laceration, no calvarial fracture or acute intracranial abnormality is seen  Other findings as above  CT CERVICAL SPINE - WITHOUT CONTRAST INDICATION:   Head trauma, moderate-severe Fall, head injury, face injury, on Eliquis  COMPARISON:  None  TECHNIQUE:  CT examination of the cervical spine was performed without intravenous contrast   Contiguous axial images were obtained  Sagittal and coronal reconstructions were performed  Radiation dose length product (DLP) for this visit:  76 310 744 mGy-cm  (accession [id number]), 314 mGy-cm  (accession [id number]), 326 mGy-cm  (accession [id number])  This examination, like all CT scans performed in the [facility], was performed utilizing techniques to minimize radiation dose exposure, including the use of iterative reconstruction and automated exposure control  IMAGE QUALITY:  Diagnostic  FINDINGS: ALIGNMENT:  Normal alignment of the cervical spine  No subluxation  VERTEBRAL BODIES:  Oblique fracture of the anteroinferior aspect of C2 (sagittal image 57, series 604)  Oblique fracture of the left transverse process of C3 (axial image 62, series 5)  Visualized bones otherwise appear intact    Anterior plate and screw fusion of C4-C7  Hardware appears intact  DEGENERATIVE CHANGES:  Intervertebral disc space narrowing at C2/C3, C3/C4, and C7/T1 with anterior, marginal, and uncovertebral osteophytosis at these levels  Multilevel facet joint arthropathy  PREVERTEBRAL AND PARASPINAL SOFT TISSUES:  Grossly unremarkable THORACIC INLET:  Old fracture of the left clavicle  Moderate to severe pulmonary emphysematous changes  Mild atherosclerosis  IMPRESSION: Oblique fracture of the anteroinferior aspect of C2 (sagittal image 57, series 604)  Oblique fracture of the left transverse process of C3 (axial image 62, series 5)  Recommend CTA of the neck to exclude vascular injury on the left  Spinal alignment appears maintained  Degenerative changes as described  Old fracture of the left clavicle, pulmonary emphysematous changes, and other findings as above  CT FACIAL BONES WITHOUT INTRAVENOUS CONTRAST INDICATION:   Head trauma, moderate-severe Fall, head injury, face injury, on Eliquis  COMPARISON: None  TECHNIQUE:  Axial CT images were obtained through the facial bones with additional sagittal and coronal reconstructions  Radiation dose length product (DLP) for this visit:  76 310 744 mGy-cm  (accession [id number]), 314 mGy-cm  (accession [id number]), 326 mGy-cm  (accession [id number])  This examination, like all CT scans performed in the [facility], was performed utilizing techniques to minimize radiation dose exposure, including the use of iterative reconstruction and automated exposure control  IMAGE QUALITY:  Diagnostic  FINDINGS: FACIAL BONES:  Nasal bone fracture  Normal alignment of the temporomandibular joints  No suspicious appearing osseous lesion  ORBITS:  Orbital globes, optic nerves, and extraocular muscles appear symmetric and normal  There is no evidence of retrobulbar mass, abscess, or hematoma  SINUSES:  Opacification of several bilateral ethmoid air cells; otherwise grossly unremarkable   SOFT TISSUES: Perinasal soft tissue swelling and a small amount of subcutaneous emphysema  IMPRESSION: Nasal bone fracture with associated perinasal soft tissue swelling  The orbits appear intact  Other findings as above  I personally discussed this study with Nghia Wheat on 8/21/2022 at 4:32 AM  Workstation performed: [id number]     CTA neck with and without contrast    Result Date: 8/21/2022  Impression: No vascular enhancement of left vertebral artery in the neck  However, in this patient with no acute/persistent neurologic symptoms, the appearance is most suggestive of anatomic variation rather than acute traumatic left vertebral artery injury, specifically as there is no enhancement even of left vertebral artery origin  There is enhancement of left vertebral artery and left posterior cerebellar artery above the level of foramen magnum presumably via collateral circulation across vertebral basilar junction  There are patent posterior communicating arteries bilaterally and the basilar artery is somewhat diminutive consistent with anatomic variation  Continued neurologic monitoring is recommended to exclude the development of neurologic symptoms  Atherosclerotic changes, more advanced stability expected for the patient's age but without flow-limiting atherosclerotic stenosis  Emphysematous changes in the visualized upper lung zones  Reidentification of cervical spine degenerative changes and no cervical spine fractures without malalignment  I personally discussed this study with Nghia Wheat on 8/21/2022 at 6:10 AM  Workstation performed: [id number]     CT spine cervical without contrast    Result Date: 8/21/2022  Impression: Large posterior vertex scalp hematoma/laceration, no calvarial fracture or acute intracranial abnormality is seen  Other findings as above  CT CERVICAL SPINE - WITHOUT CONTRAST INDICATION:   Head trauma, moderate-severe Fall, head injury, face injury, on Eliquis  COMPARISON:  None  TECHNIQUE:  CT examination of the cervical spine was performed without intravenous contrast   Contiguous axial images were obtained  Sagittal and coronal reconstructions were performed  Radiation dose length product (DLP) for this visit:  76 310 744 mGy-cm  (accession [id number]), 314 mGy-cm  (accession [id number]), 326 mGy-cm  (accession [id number])  This examination, like all CT scans performed in the [facility], was performed utilizing techniques to minimize radiation dose exposure, including the use of iterative reconstruction and automated exposure control  IMAGE QUALITY:  Diagnostic  FINDINGS: ALIGNMENT:  Normal alignment of the cervical spine  No subluxation  VERTEBRAL BODIES:  Oblique fracture of the anteroinferior aspect of C2 (sagittal image 57, series 604)  Oblique fracture of the left transverse process of C3 (axial image 62, series 5)  Visualized bones otherwise appear intact  Anterior plate and screw fusion of C4-C7  Hardware appears intact  DEGENERATIVE CHANGES:  Intervertebral disc space narrowing at C2/C3, C3/C4, and C7/T1 with anterior, marginal, and uncovertebral osteophytosis at these levels  Multilevel facet joint arthropathy  PREVERTEBRAL AND PARASPINAL SOFT TISSUES:  Grossly unremarkable THORACIC INLET:  Old fracture of the left clavicle  Moderate to severe pulmonary emphysematous changes  Mild atherosclerosis  IMPRESSION: Oblique fracture of the anteroinferior aspect of C2 (sagittal image 57, series 604)  Oblique fracture of the left transverse process of C3 (axial image 62, series 5)  Recommend CTA of the neck to exclude vascular injury on the left  Spinal alignment appears maintained  Degenerative changes as described  Old fracture of the left clavicle, pulmonary emphysematous changes, and other findings as above  CT FACIAL BONES WITHOUT INTRAVENOUS CONTRAST INDICATION:   Head trauma, moderate-severe Fall, head injury, face injury, on Eliquis  COMPARISON: None   TECHNIQUE: Axial CT images were obtained through the facial bones with additional sagittal and coronal reconstructions  Radiation dose length product (DLP) for this visit:  76 310 744 mGy-cm  (accession [id number]), 314 mGy-cm  (accession [id number]), 326 mGy-cm  (accession [id number])  This examination, like all CT scans performed in the [facility], was performed utilizing techniques to minimize radiation dose exposure, including the use of iterative reconstruction and automated exposure control  IMAGE QUALITY:  Diagnostic  FINDINGS: FACIAL BONES:  Nasal bone fracture  Normal alignment of the temporomandibular joints  No suspicious appearing osseous lesion  ORBITS:  Orbital globes, optic nerves, and extraocular muscles appear symmetric and normal  There is no evidence of retrobulbar mass, abscess, or hematoma  SINUSES:  Opacification of several bilateral ethmoid air cells; otherwise grossly unremarkable  SOFT TISSUES:  Perinasal soft tissue swelling and a small amount of subcutaneous emphysema  IMPRESSION: Nasal bone fracture with associated perinasal soft tissue swelling  The orbits appear intact  Other findings as above  I personally discussed this study with Brooke Hernandes on 8/21/2022 at 4:32 AM  Workstation performed: [id number]       EKG, Pathology, and Other Studies: I have personally reviewed pertinent reports        VTE Pharmacologic Prophylaxis: Enoxaparin (Lovenox)    VTE Mechanical Prophylaxis: sequential compression device

## 2022-08-22 NOTE — PROGRESS NOTES
Daily Progress Note - Critical Care   Alina Erwin 57 y o  female MRN: [id number]  Unit/Bed#: ICU 13 Encounter: [id number]        ----------------------------------------------------------------------------------------  HPI/24hr events: 57 YOF with PMHx of Parkinson's, Emphysema, hx of DVT on Eliquis who presents from [address] [place] [address] after sustaining a fall in the bathroom and hitting her head on the shower stall  Was found to have nasal bone fractures prompting OMFS consult and Unasyn  Patient was also found to have C2 and C3 fractures without neuro deficits  NSGY recommending [place] collar and nonop  Overnight patient with hypotension to 70's over 40's, prompting 500cc IVF and 1uPRBC transfusion  With repeat bout of hypotension overnight receiving 500cc 5% albumin and additional 500cc IV crystolloid  No additional overnight events  ---------------------------------------------------------------------------------------  SUBJECTIVE  " I feel good today, I am not having any pain"    Denies CP,SOB, N/V/D    Review of Systems   Constitutional: Negative  HENT: Negative  Eyes: Negative  Respiratory: Negative for shortness of breath  Cardiovascular: Negative for chest pain and palpitations  Gastrointestinal: Negative for abdominal distention, diarrhea, nausea and vomiting  Endocrine: Negative  Genitourinary: Negative  Musculoskeletal: Negative  Skin: Negative  Allergic/Immunologic: Negative  Neurological: Negative  Hematological: Negative  Psychiatric/Behavioral: Negative        Review of systems was reviewed and negative unless stated above in HPI/24-hour events   ---------------------------------------------------------------------------------------  Assessment and Plan:    Neuro:    Diagnosis: C2 and C3 fractures, old clavicle fracture  o Plan: NSGY consulted  o Continue [place] Collar  o Can deescalate neurochecks  o Continue to trend Exam, without deficits   Diagnosis: Parkinson's Disease  o Plan: Continue Carbidopa Levodopa/ Comtan   Diagnosis: Analgesia  o Plan: Continue scheduled tylenol  o PRN dilaudid (0 doses/24hrs)  o PRN oxy (0 doses/24hrs)   Diagnosis: Anxiety/Depression  o Plan: Continue home paxil      CV:    Diagnosis: Hypotension  o Plan: Suspect 2/2 hypovolemia  o S/p 1L IV crystalloid, 1u PRBC, 500cc 5% albumin  o SBPs now averaging 110's to 120's  o Maintain MAP >65    Pulm:   Diagnosis: Emphysema, AHRF  o Plan: No home O2 use  o Currently on 2L NC, wean as able to maintain spo2 >92%  o IS  o Respiratory protocol  o With wheezes this AM  o PRN albuterol, respiratory protocol    GI:    Diagnosis: No active issues  o Plan: NPO in anticipation of OR]  o Bowel regimen  o GI PPI not indicated    :    Diagnosis: No acute issues  o Plan: BUN/Cr stable  o Strict I and O      F/E/N:    F- No maintenance fluids   E- Replete as needed   N- NPO in anticipation of OR      Heme/Onc:    Diagnosis: Monoclonal gammopathy  o Plan: On eliquis as outpatient with ASA  o Recurrent DVTs, PEs  o Restart Eliquis and ASA   o S/p 1u PRBC for hgb 7 1  o Hgb currently 9 8  o Continue to trend    Endo:    Diagnosis: No active issues  o Plan: BG within range      ID:    Diagnosis: None acute  o Plan: On Unasyn for nasal bone fractures  o Afebrile  o WBC stable  o Trend WBC and fevers      MSK/Skin:    Diagnosis: Bilateral nasal bone fracture  o Plan: OMFS consulted, planning surgery  o Continue Unasyn   Diagnosis: Scalp Laceration  o Plan: S/p staple repair  o Staple removal in 10-14 days    Patient appropriate for transfer out of the ICU today?: Patient does not meet criteria for ICU Follow-up Clinic; referral has not been made     Disposition: Transfer to Stepdown Level 2  Code Status: Level 1 - Full Code  ---------------------------------------------------------------------------------------  ICU CORE MEASURES    Prophylaxis   VTE Pharmacologic Prophylaxis: Heparin  VTE Mechanical Prophylaxis: sequential compression device  Stress Ulcer Prophylaxis: Prophylaxis Not Indicated     CAM-ICU: Negative    Invasive Devices Review  Invasive Devices  Report    Peripheral Intravenous Line  Duration           Peripheral IV 22 Left;Proximal;Ventral (anterior) Forearm <1 day    Peripheral IV 22 Right;Upper;Ventral (anterior) Arm <1 day              Can any invasive devices be discontinued today? No  ---------------------------------------------------------------------------------------  OBJECTIVE    Vitals   Vitals:    22 0430 22 0500 22 0530 22 0600   BP: 110/66 109/66  101/52   BP Location:       Pulse: 68 68 62 (!) 54   Resp:    Temp:       TempSrc:       SpO2: 100% 100% 97% 98%   Weight:       Height:         Temp (24hrs), Av 7 °F (36 5 °C), Min:97 1 °F (36 2 °C), Max:98 1 °F (36 7 °C)  Current: Temperature: 97 8 °F (36 6 °C)    Respiratory:  SpO2: SpO2: 98 %, SpO2 Activity: SpO2 Activity: At Rest  Nasal Cannula O2 Flow Rate (L/min): 2 L/min    Invasive/non-invasive ventilation settings   Respiratory  Report   Lab Data (Last 4 hours)    None         O2/Vent Data (Last 4 hours)    None                Physical Exam  Vitals and nursing note reviewed  HENT:      Right Ear: External ear normal       Left Ear: External ear normal       Nose:      Comments: Displaced nasal bone fracture  Cardiovascular:      Rate and Rhythm: Regular rhythm  Bradycardia present  Pulses: Normal pulses  Radial pulses are 2+ on the right side and 2+ on the left side  Dorsalis pedis pulses are 2+ on the right side and 2+ on the left side  Heart sounds: Normal heart sounds, S1 normal and S2 normal  No murmur heard  Pulmonary:      Effort: Pulmonary effort is normal  No respiratory distress  Breath sounds: Examination of the right-upper field reveals wheezing  Examination of the left-upper field reveals wheezing  Wheezing present     Abdominal: General: Abdomen is flat  Bowel sounds are normal  There is no distension  Palpations: Abdomen is soft  Tenderness: There is no abdominal tenderness  Musculoskeletal:      Right lower leg: No edema  Left lower leg: No edema  Skin:     General: Skin is warm and dry  Capillary Refill: Capillary refill takes less than 2 seconds  Neurological:      Mental Status: She is alert and oriented to person, place, and time  GCS: GCS eye subscore is 4  GCS verbal subscore is 5  GCS motor subscore is 6  Psychiatric:         Behavior: Behavior is cooperative  Laboratory and Diagnostics:  Results from last 7 days   Lab Units 08/22/22  0541 08/21/22  1942 08/21/22  1452 08/21/22  0316   WBC Thousand/uL 5 42  --   --  9 36   HEMOGLOBIN g/dL 9 8*  9 7* 8 8* 7 1* 7 7*   HEMATOCRIT % 31 9*  31 5* 28 8* 24 2* 25 5*   PLATELETS Thousands/uL 135*  --   --  169   NEUTROS PCT % 36*  --   --  65   MONOS PCT % 11  --   --  10     Results from last 7 days   Lab Units 08/22/22  0541 08/21/22  0316   SODIUM mmol/L 136 139   POTASSIUM mmol/L 4 5 4 4   CHLORIDE mmol/L 103 105   CO2 mmol/L 31 29   ANION GAP mmol/L 2* 5   BUN mg/dL 13 20   CREATININE mg/dL 0 78 0 84   CALCIUM mg/dL 8 5 7 4*   GLUCOSE RANDOM mg/dL 102 121   ALT U/L 14 15   AST U/L 43 51*   ALK PHOS U/L 135* 108   ALBUMIN g/dL 2 7* 2 4*   TOTAL BILIRUBIN mg/dL 2 48* 0 56     Results from last 7 days   Lab Units 08/22/22  0541 08/21/22  1452   MAGNESIUM mg/dL 2 6 1 7   PHOSPHORUS mg/dL 3 6 4 2*      Results from last 7 days   Lab Units 08/21/22  0316   INR  1 36*   PTT seconds 33      Micro  N/a    EKG: NS bradycardia rate 54  Imaging: I have personally reviewed pertinent reports  CTA neck: No vascular enhancement of left vertebral artery in the neck    However, in this patient with no acute/persistent neurologic symptoms, the appearance is most suggestive of anatomic variation rather than acute traumatic left vertebral artery injury, specifically as there is no enhancement even of left vertebral artery origin  There is enhancement of left vertebral artery and left posterior cerebellar artery above the level of foramen magnum presumably via collateral circulation across vertebral   basilar junction  There are patent posterior communicating arteries bilaterally and the basilar artery is somewhat diminutive consistent with anatomic variation  Continued neurologic monitoring is recommended to exclude the development of neurologic   symptoms      Atherosclerotic changes, more advanced stability expected for the patient's age but without flow-limiting atherosclerotic stenosis  Emphysematous changes in the visualized upper lung zones      Reidentification of cervical spine degenerative changes and no cervical spine fractures without malalignment  CT facial bones- Nasal bone fracture with associated perinasal soft tissue swelling  CT C spine:Oblique fracture of the anteroinferior aspect of C2 (sagittal image 57, series 604)  Oblique fracture of the left transverse process of C3 (axial image 62, series 5)  CTH- Large posterior vertex scalp hematoma/laceration, no calvarial fracture or acute intracranial abnormality is seen  Intake and Output  I/O       08/20 0701  08/21 0700 08/21 0701  08/22 0700    P  O   200    I V  (mL/kg)  1090 (25 3)    Blood  449 2    IV Piggyback  500    Total Intake(mL/kg)  2239 2 (52)    Urine (mL/kg/hr)  700    Total Output  700    Net  +1539 2              UOP: 50 ml/hr     Height and Weights   Height: 5' 1" (154 9 cm)     Body mass index is 17 95 kg/m²    Weight (last 2 days)     Date/Time Weight    08/21/22 1200 43 1 (95 02)            Nutrition       Diet Orders   (From admission, onward)             Start     Ordered    08/22/22 0001  Diet NPO; Sips with meds  Diet effective midnight        References:    Nutrtion Support Algorithm Enteral vs  Parenteral   Question Answer Comment   Diet Type NPO    NPO Except: Sips with meds    RD to adjust diet per protocol?  Yes        08/21/22 1411    08/21/22 1413  Dietary nutrition supplements  Once        Comments: Patient can choose enlive flavors at preference   Question Answer Comment   Select Supplement: Ensure Enlive-Chocolate    Frequency Lunch, Dinner        08/21/22 1412              Active Medications  Scheduled Meds:  Current Facility-Administered Medications   Medication Dose Route Frequency Provider Last Rate    acetaminophen  975 mg Oral [facility] Harriet Greene DO      acetylcysteine  3 mL Nebulization TID Tami Cano DO      albuterol  2 5 mg Nebulization Q6H PRN TAY Grady      ampicillin-sulbactam  3 g Intravenous Q6H Cyndi Yancey MD Stopped (08/22/22 0243)    aspirin  81 mg Oral Daily Cyndi Yancey MD      carbidopa-levodopa  2 tablet Oral 4x Daily Cyndi Yancey MD      And    entacapone  200 mg Oral 4x Daily Cyndi Yancey MD      gabapentin  300 mg Oral TID Cyndi Yancey MD      heparin (porcine)  5,000 Units Subcutaneous [facility] Cyndi Yancey MD      HYDROmorphone  0 2 mg Intravenous Q4H PRN Harriet Greene DO      levalbuterol  1 25 mg Nebulization TID Tami Cano DO      lidocaine  1 patch Topical Daily Cyndi Yancey MD      nicotine  14 mg Transdermal Daily Cyndi Yancey MD      oxyCODONE  2 5 mg Oral Q4H PRN Harriet Greene,       oxyCODONE  5 mg Oral Q4H PRN Harriet Greene DO      PARoxetine  20 mg Oral Daily Cyndi Yancey MD      propranolol  160 mg Oral Daily Cyndi Yancey MD      senna-docusate sodium  2 tablet Oral BID Cyndi Yancey MD       Continuous Infusions:     PRN Meds:   albuterol, 2 5 mg, Q6H PRN  HYDROmorphone, 0 2 mg, Q4H PRN  oxyCODONE, 2 5 mg, Q4H PRN  oxyCODONE, 5 mg, Q4H PRN        Allergies   No Known Allergies  ---------------------------------------------------------------------------------------  Advance Directive and Living Will:      Power of :    POLST: ---------------------------------------------------------------------------------------  Care Time Delivered:   No Critical Care time spent     [facility]    Portions of the record may have been created with voice recognition software  Occasional wrong word or "sound a like" substitutions may have occurred due to the inherent limitations of voice recognition software    Read the chart carefully and recognize, using context, where substitutions have occurred

## 2022-08-22 NOTE — PLAN OF CARE
Problem: PHYSICAL THERAPY ADULT  Goal: Performs mobility at highest level of function for planned discharge setting  See evaluation for individualized goals  Description: Treatment/Interventions: Functional transfer training, LE strengthening/ROM, Elevations, Therapeutic exercise, Endurance training, Equipment eval/education, Bed mobility, Gait training, Spoke to nursing, OT  Equipment Recommended:  (r/o rw)       See flowsheet documentation for full assessment, interventions and recommendations  Note: Prognosis: Good  Problem List: Decreased strength, Decreased endurance, Impaired balance, Decreased mobility  Assessment: Pt is 57 y o  female admitted with hx of fall and Dx of C2 and c3 cervical fracture and nasal fx  Pt 's comorbidities affecting POC include: DVT (deep venous thrombosis) ([facility] [address] )  Pulmonary emphysema and Parkinson's dz and personal factors of: [place] and steps in the house  Pt's clinical presentation is currently unstable/unpredictable which is evident in ongoing telem monitoring in ICU, abn lab values and additional procedure pending (w/ OMFS)  Pt presents w/ overall weakness, incl decreased LE strength, decreased functional endurance and activity tolerance, impaired balance, gait deviations (a few steps at bedside) and fall risk  Will cont to follow pt in PT for progressive mobilization to address above functional deficits and to max level of (I), endurance, and safety  Currently recommend rehab upon D/C  Will cont to follow until then  Barriers to Discharge: Inaccessible home environment     PT Discharge Recommendation: Post acute rehabilitation services    See flowsheet documentation for full assessment

## 2022-08-22 NOTE — QUICK NOTE
Surgery Post-Op Check  Prasanna Castellanos 57 y o  female MRN: [id number]  Unit/Bed#: ICU 13 Encounter: [id number]     S: Feeling well with no complaints    O:   Vitals:    08/22/22 1732   BP:    Pulse:    Resp:    Temp:    SpO2: 97%     I/O       08/20 0701  08/21 0700 08/21 0701  08/22 0700 08/22 0701  08/23 0700    P  O   200     I V  (mL/kg)  1090 (25 3) 400 (9 3)    Blood  449 2     IV Piggyback  500     Total Intake(mL/kg)  2239 2 (52) 400 (9 3)    Urine (mL/kg/hr)  700 425 (0 9)    Total Output  700 425    Net  +1539 2 -25               PE:  Gen:  NAD  HENT: MMM, bilateral periorbital hematomas, superior scalp laceration closed with staples without bleeding  CV:  warm, well-perfused  Lung:  normal effort, on facemask O2  Abd:  soft, NT/ND  Ext:  no CCE  Neuro:  A&Ox3, tremor present     Lab Results   Component Value Date    WBC 5 42 08/22/2022    HGB 9 8 (L) 08/22/2022    HGB 9 7 (L) 08/22/2022    HCT 31 9 (L) 08/22/2022    HCT 31 5 (L) 08/22/2022    MCV 95 08/22/2022     (L) 08/22/2022     Lab Results   Component Value Date    CALCIUM 8 5 08/22/2022    K 4 5 08/22/2022    CO2 31 08/22/2022     08/22/2022    BUN 13 08/22/2022    CREATININE 0 78 08/22/2022         A/P: 57 y o  female Day of Surgery s/p Procedure(s) (LRB):  CLOSED REDUCTION NASAL FRACTURE (Bilateral)   Ok for downgrade to step down 2 trauma team   Continue supplemental O2 for goal SpO2>92%   Continue home Parkinson's medications   Continue cervical collar for C2 and C3 fractures   Continue Unasyn for nasal fracture as per OMS   Local wound care for scalp laceration      Sandee Maza MD

## 2022-08-22 NOTE — ASSESSMENT & PLAN NOTE
· Takes carbidopa-levodopa-entacapone (STALEVO) -200 MG per tablet  · paxil  · Propranolol for tremor

## 2022-08-22 NOTE — ASSESSMENT & PLAN NOTE
· Arrived to [facility] on 3L supplemental O2 sating 100%  · PRN bronchodilators  · Q1hr I/S use  · 1PPD smoker  · Nicotine patch

## 2022-08-22 NOTE — RESTORATIVE TECHNICIAN NOTE
Restorative Technician Note      Patient Name: Justine Hampton     Note Type: Bracing, Initial consult  Patient Position Upon Consult: Supine  Brace Applied: [place] Collar Set (LVL3)  Additional Brace Ordered: No  Patient Position When Brace Applied: Supine  Education Provided: Yes; Family or social support of family present for education by provider  Patient Position at End of Consult: Supine; All needs within reach  Nurse Communication: Nurse aware of consult, application of brace      [facility], extra pads, and cervical handout at bedside  Please call Mobility Coordinator at ext  [phone] or on [place] text " [facility]-PT-Restorative Tech" role in regards to bracing instruction and/or adjustment    Raven Becerra Restorative Technician, BS

## 2022-08-22 NOTE — ASSESSMENT & PLAN NOTE
· 8/21 CT cspine: anterioinferior C2 fx, L transverse process fx C3, old L clavicle fx  · Neurosurgery consulted  · Appreciate recommendations  · Cervical spine precautions  · Non-op in cervical collar, upright imaging

## 2022-08-22 NOTE — ANESTHESIA POSTPROCEDURE EVALUATION
Post-Op Assessment Note    CV Status:  Stable  Pain Score: 0    Pain management: adequate     Mental Status:  Alert and awake   Hydration Status:  Euvolemic   PONV Controlled:  Controlled   Airway Patency:  Patent      Post Op Vitals Reviewed: Yes      Staff: CRNA         No complications documented      BP   119/61   Temp      Pulse  76   Resp  26   SpO2   100

## 2022-08-22 NOTE — PROGRESS NOTES
The patient is now being transported to the [address]  The patient is to have surgery by the [facility] Physician team  The patient's  is also here

## 2022-08-22 NOTE — ASSESSMENT & PLAN NOTE
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

## 2022-08-22 NOTE — ASSESSMENT & PLAN NOTE
· Likely 2/2 scalp laceration losses  · S/p 1u PRBC with appropriate response and stability  · Daily CBC

## 2022-08-22 NOTE — ASSESSMENT & PLAN NOTE
· 11cm scalp laceration after fall  · S/p staple repair at OSH 8/21  · Staple removal 10-14 days  · Outpatient follow up trauma clinic

## 2022-08-22 NOTE — PLAN OF CARE
Problem: MOBILITY - ADULT  Goal: Maintain or return to baseline ADL function  Description: INTERVENTIONS:  -  Assess patient's ability to carry out ADLs; assess patient's baseline for ADL function and identify physical deficits which impact ability to perform ADLs (bathing, care of mouth/teeth, toileting, grooming, dressing, etc )  - Assess/evaluate cause of self-care deficits   - Assess range of motion  - Assess patient's mobility; develop plan if impaired  - Assess patient's need for assistive devices and provide as appropriate  - Encourage maximum independence but intervene and supervise when necessary  - Involve family in performance of ADLs  - Assess for home care needs following discharge   - Consider OT consult to assist with ADL evaluation and planning for discharge  - Provide patient education as appropriate  Outcome: Progressing  Goal: Maintains/Returns to pre admission functional level  Description: INTERVENTIONS:  - Perform BMAT or MOVE assessment daily    - Set and communicate daily mobility goal to care team and patient/family/caregiver  - Collaborate with rehabilitation services on mobility goals if consulted  - Perform Range of Motion 3 times a day  - Reposition patient every 2 hours    - Dangle patient 3 times a day  - Stand patient 3 times a day  - Ambulate patient 3 times a day  - Out of bed to chair 3 times a day   - Out of bed for meals 3 times a day  - Out of bed for toileting  - Record patient progress and toleration of activity level   Outcome: Progressing     Problem: PAIN - ADULT  Goal: Verbalizes/displays adequate comfort level or baseline comfort level  Description: Interventions:  - Encourage patient to monitor pain and request assistance  - Assess pain using appropriate pain scale  - Administer analgesics based on type and severity of pain and evaluate response  - Implement non-pharmacological measures as appropriate and evaluate response  - Consider cultural and social influences on pain and pain management  - Notify physician/advanced practitioner if interventions unsuccessful or patient reports new pain  Outcome: Progressing     Problem: INFECTION - ADULT  Goal: Absence or prevention of progression during hospitalization  Description: INTERVENTIONS:  - Assess and monitor for signs and symptoms of infection  - Monitor lab/diagnostic results  - Monitor all insertion sites, i e  indwelling lines, tubes, and drains  - Monitor endotracheal if appropriate and nasal secretions for changes in amount and color  - [place] appropriate cooling/warming therapies per order  - Administer medications as ordered  - Instruct and encourage patient and family to use good hand hygiene technique  - Identify and instruct in appropriate isolation precautions for identified infection/condition  Outcome: Progressing  Goal: Absence of fever/infection during neutropenic period  Description: INTERVENTIONS:  - Monitor WBC    Outcome: Progressing     Problem: SAFETY ADULT  Goal: Maintain or return to baseline ADL function  Description: INTERVENTIONS:  -  Assess patient's ability to carry out ADLs; assess patient's baseline for ADL function and identify physical deficits which impact ability to perform ADLs (bathing, care of mouth/teeth, toileting, grooming, dressing, etc )  - Assess/evaluate cause of self-care deficits   - Assess range of motion  - Assess patient's mobility; develop plan if impaired  - Assess patient's need for assistive devices and provide as appropriate  - Encourage maximum independence but intervene and supervise when necessary  - Involve family in performance of ADLs  - Assess for home care needs following discharge   - Consider OT consult to assist with ADL evaluation and planning for discharge  - Provide patient education as appropriate  Outcome: Progressing  Goal: Maintains/Returns to pre admission functional level  Description: INTERVENTIONS:  - Perform BMAT or MOVE assessment daily    - Set and communicate daily mobility goal to care team and patient/family/caregiver  - Collaborate with rehabilitation services on mobility goals if consulted  - Perform Range of Motion 3 times a day  - Reposition patient every 2 hours    - Dangle patient 3 times a day  - Stand patient 3 times a day  - Ambulate patient 3 times a day  - Out of bed to chair 3 times a day   - Out of bed for meals 3 times a day  - Out of bed for toileting  - Record patient progress and toleration of activity level   Outcome: Progressing  Goal: Patient will remain free of falls  Description: INTERVENTIONS:  - Educate patient/family on patient safety including physical limitations  - Instruct patient to call for assistance with activity   - Consult OT/PT to assist with strengthening/mobility   - Keep Call bell within reach  - Keep bed low and locked with side rails adjusted as appropriate  - Keep care items and personal belongings within reach  - Initiate and maintain comfort rounds  - Make Fall Risk Sign visible to staff  - Offer Toileting every 2 Hours, in advance of need  - Initiate/Maintain bed alarm  - Obtain necessary fall risk management equipment: n/a  - Apply yellow socks and bracelet for high fall risk patients  - Consider moving patient to room near nurses station  Outcome: Progressing     Problem: DISCHARGE PLANNING  Goal: Discharge to home or other facility with appropriate resources  Description: INTERVENTIONS:  - Identify barriers to discharge w/patient and caregiver  - Arrange for needed discharge resources and transportation as appropriate  - Identify discharge learning needs (meds, wound care, etc )  - Arrange for interpretive services to assist at discharge as needed  - Refer to Case Management Department for coordinating discharge planning if the patient needs post-hospital services based on physician/advanced practitioner order or complex needs related to functional status, cognitive ability, or social support system  Outcome: Progressing     Problem: Knowledge Deficit  Goal: Patient/family/caregiver demonstrates understanding of disease process, treatment plan, medications, and discharge instructions  Description: Complete learning assessment and assess knowledge base  Interventions:  - Provide teaching at level of understanding  - Provide teaching via preferred learning methods  Outcome: Progressing     Problem: Nutrition/Hydration-ADULT  Goal: Nutrient/Hydration intake appropriate for improving, restoring or maintaining nutritional needs  Description: Monitor and assess patient's nutrition/hydration status for malnutrition  Collaborate with interdisciplinary team and initiate plan and interventions as ordered  Monitor patient's weight and dietary intake as ordered or per policy  Utilize nutrition screening tool and intervene as necessary  Determine patient's food preferences and provide high-protein, high-caloric foods as appropriate       INTERVENTIONS:  - Monitor oral intake, urinary output, labs, and treatment plans  - Assess nutrition and hydration status and recommend course of action  - Evaluate amount of meals eaten  - Assist patient with eating if necessary   - Allow adequate time for meals  - Recommend/ encourage appropriate diets, oral nutritional supplements, and vitamin/mineral supplements  - Order, calculate, and assess calorie counts as needed  - Recommend, monitor, and adjust tube feedings and TPN/PPN based on assessed needs  - Assess need for intravenous fluids  - Provide specific nutrition/hydration education as appropriate  - Include patient/family/caregiver in decisions related to nutrition  Outcome: Progressing     Problem: Prexisting or High Potential for Compromised Skin Integrity  Goal: Skin integrity is maintained or improved  Description: INTERVENTIONS:  - Identify patients at risk for skin breakdown  - Assess and monitor skin integrity  - Assess and monitor nutrition and hydration status  - Monitor labs   - Assess for incontinence   - Turn and reposition patient  - Assist with mobility/ambulation  - Relieve pressure over bony prominences  - Avoid friction and shearing  - Provide appropriate hygiene as needed including keeping skin clean and dry  - Evaluate need for skin moisturizer/barrier cream  - Collaborate with interdisciplinary team   - Patient/family teaching  - Consider wound care consult   Outcome: Progressing     Problem: Potential for Falls  Goal: Patient will remain free of falls  Description: INTERVENTIONS:  - Educate patient/family on patient safety including physical limitations  - Instruct patient to call for assistance with activity   - Consult OT/PT to assist with strengthening/mobility   - Keep Call bell within reach  - Keep bed low and locked with side rails adjusted as appropriate  - Keep care items and personal belongings within reach  - Initiate and maintain comfort rounds  - Make Fall Risk Sign visible to staff  - Offer Toileting every 2 Hours, in advance of need  - Initiate/Maintain bed alarm  - Obtain necessary fall risk management equipment: n/a  - Apply yellow socks and bracelet for high fall risk patients  - Consider moving patient to room near nurses station  Outcome: Progressing

## 2022-08-22 NOTE — ASSESSMENT & PLAN NOTE
· 8/21 mechanical fall d/t ambulatory dysfunction and Parkinson's disease  · No home assistive devices for ambulation  · PT OT consulted

## 2022-08-22 NOTE — PHYSICAL THERAPY NOTE
Physical Therapy Evaluation     Patient's Name: Dyan Lujan    Admitting Diagnosis  Nasal bone fracture [S02  2XXA]  Closed nondisplaced fracture of second cervical vertebra, unspecified fracture morphology, initial encounter ([place] [address] ) [S12 101A]    Problem List  Patient Active Problem List   Diagnosis    Parkinson disease ([facility] )    C2 cervical fracture ([facility] )    C3 cervical fracture ([place] [address] )    Nasal fracture    Unspecified injury of left vertebral artery, initial encounter    Scalp laceration, initial encounter    Fall    DVT (deep venous thrombosis) ([facility] )    Pulmonary emphysema (HCC)    MGUS (monoclonal gammopathy of unknown significance)    Basedow's disease    Acute blood loss anemia       Past Medical History  Past Medical History:   Diagnosis Date    Basedow's disease     Disease of thyroid gland     DVT (deep venous thrombosis) (HCC)     MGUS (monoclonal gammopathy of unknown significance)     Pulmonary emphysema ([facility] )        Past Surgical History  History reviewed  No pertinent surgical history  08/22/22 0901   PT Last Visit   PT Visit Date 08/22/22   Note Type   Note type Evaluation   Pain Assessment   Pain Assessment Tool 0-10   Pain Score No Pain   Restrictions/Precautions   Braces or Orthoses C/S Collar   Other Precautions Multiple lines;Telemetry; Fall Risk; Spinal precautions  (chair alarm on at the end of session)   Home Living   Type of [address] [place] [address] Two level; Able to live on main level with bedroom/bathroom  (5 [place] w/ hand rail)   Prior Function   Level of [place] Independent with ADLs and functional mobility  (amb w/o AD)   Lives With Spouse  (sister)   Falls in the last 6 months 1 to 4  (3)   Vocational On disability   General   Additional Pertinent History cleared for assessment by parrish   Cognition   Overall Cognitive Status WFL   Arousal/Participation Alert   Orientation Level Oriented to person;Oriented to place;Oriented to situation  (month)   Memory Decreased recall of recent events;Decreased recall of precautions   Following Commands Follows one step commands without difficulty   Subjective   Subjective Alert; in bed; responds to questions appropriately; pleasant and cooperative; agreeable to mobilize   RUE Assessment   RUE Assessment WFL  (AROM)   LUE Assessment   LUE Assessment WFL  (AROM)   RLE Assessment   RLE Assessment WFL  (AROM)   Strength RLE   RLE Overall Strength   (fair + (grossly))   LLE Assessment   LLE Assessment WFL  (AROM)   Strength LLE   LLE Overall Strength   (fair + (grossly))   Bed Mobility   Sit to Supine 3  Moderate assistance   Additional items Assist x 1; Increased time required;LE management   Transfers   Sit to Stand 3  Moderate assistance   Additional items Assist x 1; Increased time required;Verbal cues   Stand to Sit 3  Moderate assistance   Additional items Assist x 1; Increased time required;Verbal cues   Ambulation/Elevation   Gait pattern Excessively slow; Short stride; Inconsistent carter   Gait Assistance 3  Moderate assist   Additional items Verbal cues; Tactile cues; Assist x 2   Assistive Device Other (Comment)  (hand hold (A); rw next visit)   Distance 3 ft total distance for bed to chair transition   Balance   Static Sitting Fair -   Dynamic Sitting Poor +   Static Standing Poor +   Dynamic Standing Poor   Ambulatory Poor   Activity Tolerance   Activity Tolerance Patient limited by fatigue; Other (Comment)  (some VAUGHN w/ transient decline in O2 sat to 80s %; RN aware)   Medical Staff Made Aware Co-eval performed w/ OTR due to complexity of medical status and multiple comorbidities   Nurse Made Aware spoke to Keo Chavez, [address] [place] [address]   Assessment   Prognosis Good   Problem List Decreased strength;Decreased endurance; Impaired balance;Decreased mobility   Assessment Pt is 57 y o  female admitted with hx of fall and Dx of C2 and c3 cervical fracture and nasal fx  Pt 's comorbidities affecting POC include: DVT (deep venous thrombosis) ([facility] )   Pulmonary emphysema and Parkinson's dz and personal factors of: CLIFTON and steps in the house  Pt's clinical presentation is currently unstable/unpredictable which is evident in ongoing telem monitoring in ICU, abn lab values and additional procedure pending (w/ OMFS)  Pt presents w/ overall weakness, incl decreased LE strength, decreased functional endurance and activity tolerance, impaired balance, gait deviations (a few steps at bedside) and fall risk  Will cont to follow pt in PT for progressive mobilization to address above functional deficits and to max level of (I), endurance, and safety  Currently recommend rehab upon D/C  Will cont to follow until then  Barriers to Discharge Inaccessible home environment   Goals   Patient Goals to get better   STG Expiration Date 08/22/22   Short Term Goal #1 7-10 days  Pt will amb 150 ft w/ rw <--> SPC, mod (I) in order to facilitate safe return to premorbid environment and to initiate return to community amb status  Pt will negotiate 5 steps w/ hand rail and SPC PRN, (S)x1 in order to assure safe navigation in and out of the premorbid living environment  Pt will achieve (I) level w/ bed mob in order to facilitate safety with OOB and back to bed transitions in own living environment  Pt will perform transfers w/ mod (I) to assure (I) and safety w/ functional mobility/transitions w/ all aspects of mobility/locomotion  Pt will participate in LE therex and balance activities to max progression w/ mobility skills  PT Treatment Day 0   Plan   Treatment/Interventions Functional transfer training;LE strengthening/ROM; Elevations; Therapeutic exercise; Endurance training;Equipment eval/education; Bed mobility;Gait training;Spoke to nursing;OT   PT Frequency Other (Comment)  (3-6x/wk)   Recommendation   PT Discharge Recommendation Post acute rehabilitation services   Equipment Recommended   (r/o rw)   AM-PAC Basic Mobility Inpatient   Turning in Bed Without Bedrails 3   Lying on Back to Sitting on Edge of Flat Bed 2   Moving Bed to Chair 2   Standing Up From Chair 2   Walk in Room 2   Climb 3-5 Stairs 2   Basic Mobility Inpatient Raw Score 13   Basic Mobility Standardized Score 33 99   Highest Level Of Mobility   -[facility] Goal 4: Move to chair/commode   -HL Achieved 4: Move to chair/commode   Modified [place] Scale   Modified [place] Scale 4   End of Consult   Patient Position at End of Consult Bedside chair;Bed/Chair alarm activated; All needs within reach       [facility],

## 2022-08-23 LAB
ANION GAP SERPL CALCULATED.3IONS-SCNC: 4 MMOL/L (ref 4–13)
BASOPHILS # BLD AUTO: 0.03 THOUSANDS/ΜL (ref 0–0.1)
BASOPHILS NFR BLD AUTO: 1 % (ref 0–1)
BUN SERPL-MCNC: 10 MG/DL (ref 5–25)
CA-I BLD-SCNC: 1.12 MMOL/L (ref 1.12–1.32)
CALCIUM SERPL-MCNC: 8.3 MG/DL (ref 8.3–10.1)
CHLORIDE SERPL-SCNC: 103 MMOL/L (ref 96–108)
CO2 SERPL-SCNC: 30 MMOL/L (ref 21–32)
CREAT SERPL-MCNC: 0.7 MG/DL (ref 0.6–1.3)
EOSINOPHIL # BLD AUTO: 0.1 THOUSAND/ΜL (ref 0–0.61)
EOSINOPHIL NFR BLD AUTO: 2 % (ref 0–6)
ERYTHROCYTE [DISTWIDTH] IN BLOOD BY AUTOMATED COUNT: 18.4 % (ref 11.6–15.1)
GFR SERPL CREATININE-BSD FRML MDRD: 93 ML/MIN/1.73SQ M
GLUCOSE SERPL-MCNC: 98 MG/DL (ref 65–140)
HCT VFR BLD AUTO: 30.6 % (ref 34.8–46.1)
HGB BLD-MCNC: 9.4 G/DL (ref 11.5–15.4)
IMM GRANULOCYTES # BLD AUTO: 0.01 THOUSAND/UL (ref 0–0.2)
IMM GRANULOCYTES NFR BLD AUTO: 0 % (ref 0–2)
LYMPHOCYTES # BLD AUTO: 2.06 THOUSANDS/ΜL (ref 0.6–4.47)
LYMPHOCYTES NFR BLD AUTO: 36 % (ref 14–44)
MCH RBC QN AUTO: 29.1 PG (ref 26.8–34.3)
MCHC RBC AUTO-ENTMCNC: 30.7 G/DL (ref 31.4–37.4)
MCV RBC AUTO: 95 FL (ref 82–98)
MONOCYTES # BLD AUTO: 0.56 THOUSAND/ΜL (ref 0.17–1.22)
MONOCYTES NFR BLD AUTO: 10 % (ref 4–12)
NEUTROPHILS # BLD AUTO: 3.04 THOUSANDS/ΜL (ref 1.85–7.62)
NEUTS SEG NFR BLD AUTO: 51 % (ref 43–75)
NRBC BLD AUTO-RTO: 0 /100 WBCS
PLATELET # BLD AUTO: 152 THOUSANDS/UL (ref 149–390)
PMV BLD AUTO: 11.7 FL (ref 8.9–12.7)
POTASSIUM SERPL-SCNC: 4.1 MMOL/L (ref 3.5–5.3)
RBC # BLD AUTO: 3.23 MILLION/UL (ref 3.81–5.12)
SODIUM SERPL-SCNC: 137 MMOL/L (ref 135–147)
WBC # BLD AUTO: 5.8 THOUSAND/UL (ref 4.31–10.16)

## 2022-08-23 PROCEDURE — 85025 COMPLETE CBC W/AUTO DIFF WBC: CPT | Performed by: PHYSICIAN ASSISTANT

## 2022-08-23 PROCEDURE — 94668 MNPJ CHEST WALL SBSQ: CPT

## 2022-08-23 PROCEDURE — 94760 N-INVAS EAR/PLS OXIMETRY 1: CPT

## 2022-08-23 PROCEDURE — 80048 BASIC METABOLIC PNL TOTAL CA: CPT | Performed by: PHYSICIAN ASSISTANT

## 2022-08-23 PROCEDURE — 94640 AIRWAY INHALATION TREATMENT: CPT

## 2022-08-23 PROCEDURE — 82330 ASSAY OF CALCIUM: CPT | Performed by: PHYSICIAN ASSISTANT

## 2022-08-23 PROCEDURE — 99232 SBSQ HOSP IP/OBS MODERATE 35: CPT | Performed by: EMERGENCY MEDICINE

## 2022-08-23 RX ADMIN — CARBIDOPA AND LEVODOPA 2 TABLET: 25; 100 TABLET ORAL at 14:36

## 2022-08-23 RX ADMIN — ENTACAPONE 200 MG: 200 TABLET, FILM COATED ORAL at 08:26

## 2022-08-23 RX ADMIN — CARBIDOPA AND LEVODOPA 2 TABLET: 25; 100 TABLET ORAL at 08:26

## 2022-08-23 RX ADMIN — APIXABAN 5 MG: 5 TABLET, FILM COATED ORAL at 17:43

## 2022-08-23 RX ADMIN — PROPRANOLOL HYDROCHLORIDE 160 MG: 80 CAPSULE, EXTENDED RELEASE ORAL at 08:26

## 2022-08-23 RX ADMIN — ENTACAPONE 200 MG: 200 TABLET, FILM COATED ORAL at 14:36

## 2022-08-23 RX ADMIN — LIDOCAINE 5% 1 PATCH: 700 PATCH TOPICAL at 08:25

## 2022-08-23 RX ADMIN — CARBIDOPA AND LEVODOPA 2 TABLET: 25; 100 TABLET ORAL at 17:43

## 2022-08-23 RX ADMIN — GABAPENTIN 300 MG: 300 CAPSULE ORAL at 17:43

## 2022-08-23 RX ADMIN — ACETAMINOPHEN 975 MG: 325 TABLET ORAL at 22:23

## 2022-08-23 RX ADMIN — NICOTINE 14 MG: 14 PATCH, EXTENDED RELEASE TRANSDERMAL at 08:25

## 2022-08-23 RX ADMIN — AMPICILLIN SODIUM AND SULBACTAM SODIUM 3 G: 2; 1 INJECTION, POWDER, FOR SOLUTION INTRAMUSCULAR; INTRAVENOUS at 22:23

## 2022-08-23 RX ADMIN — AMPICILLIN SODIUM AND SULBACTAM SODIUM 3 G: 2; 1 INJECTION, POWDER, FOR SOLUTION INTRAMUSCULAR; INTRAVENOUS at 11:23

## 2022-08-23 RX ADMIN — AMPICILLIN SODIUM AND SULBACTAM SODIUM 3 G: 2; 1 INJECTION, POWDER, FOR SOLUTION INTRAMUSCULAR; INTRAVENOUS at 17:43

## 2022-08-23 RX ADMIN — AMPICILLIN SODIUM AND SULBACTAM SODIUM 3 G: 2; 1 INJECTION, POWDER, FOR SOLUTION INTRAMUSCULAR; INTRAVENOUS at 05:29

## 2022-08-23 RX ADMIN — LEVALBUTEROL HYDROCHLORIDE 1.25 MG: 1.25 SOLUTION, CONCENTRATE RESPIRATORY (INHALATION) at 19:24

## 2022-08-23 RX ADMIN — PAROXETINE HYDROCHLORIDE 20 MG: 20 TABLET, FILM COATED ORAL at 08:26

## 2022-08-23 RX ADMIN — GABAPENTIN 300 MG: 300 CAPSULE ORAL at 22:23

## 2022-08-23 RX ADMIN — LEVALBUTEROL HYDROCHLORIDE 1.25 MG: 1.25 SOLUTION, CONCENTRATE RESPIRATORY (INHALATION) at 13:11

## 2022-08-23 RX ADMIN — SENNOSIDES AND DOCUSATE SODIUM 2 TABLET: 50; 8.6 TABLET ORAL at 17:43

## 2022-08-23 RX ADMIN — ENTACAPONE 200 MG: 200 TABLET, FILM COATED ORAL at 22:24

## 2022-08-23 RX ADMIN — ACETAMINOPHEN 975 MG: 325 TABLET ORAL at 05:12

## 2022-08-23 RX ADMIN — SENNOSIDES AND DOCUSATE SODIUM 2 TABLET: 50; 8.6 TABLET ORAL at 08:26

## 2022-08-23 RX ADMIN — ACETAMINOPHEN 975 MG: 325 TABLET ORAL at 14:35

## 2022-08-23 RX ADMIN — ACETYLCYSTEINE 600 MG: 200 SOLUTION ORAL; RESPIRATORY (INHALATION) at 19:24

## 2022-08-23 RX ADMIN — ENTACAPONE 200 MG: 200 TABLET, FILM COATED ORAL at 17:44

## 2022-08-23 RX ADMIN — ACETYLCYSTEINE 600 MG: 200 SOLUTION ORAL; RESPIRATORY (INHALATION) at 07:15

## 2022-08-23 RX ADMIN — ACETYLCYSTEINE 600 MG: 200 SOLUTION ORAL; RESPIRATORY (INHALATION) at 13:11

## 2022-08-23 RX ADMIN — CARBIDOPA AND LEVODOPA 2 TABLET: 25; 100 TABLET ORAL at 22:23

## 2022-08-23 RX ADMIN — ASPIRIN 81 MG: 81 TABLET, COATED ORAL at 08:26

## 2022-08-23 RX ADMIN — ENOXAPARIN SODIUM 30 MG: 30 INJECTION SUBCUTANEOUS at 08:27

## 2022-08-23 RX ADMIN — LEVALBUTEROL HYDROCHLORIDE 1.25 MG: 1.25 SOLUTION, CONCENTRATE RESPIRATORY (INHALATION) at 07:15

## 2022-08-23 RX ADMIN — GABAPENTIN 300 MG: 300 CAPSULE ORAL at 08:26

## 2022-08-23 NOTE — PLAN OF CARE
Problem: MOBILITY - ADULT  Goal: Maintain or return to baseline ADL function  Description: INTERVENTIONS:  -  Assess patient's ability to carry out ADLs; assess patient's baseline for ADL function and identify physical deficits which impact ability to perform ADLs (bathing, care of mouth/teeth, toileting, grooming, dressing, etc )  - Assess/evaluate cause of self-care deficits   - Assess range of motion  - Assess patient's mobility; develop plan if impaired  - Assess patient's need for assistive devices and provide as appropriate  - Encourage maximum independence but intervene and supervise when necessary  - Involve family in performance of ADLs  - Assess for home care needs following discharge   - Consider OT consult to assist with ADL evaluation and planning for discharge  - Provide patient education as appropriate  Outcome: Progressing  Goal: Maintains/Returns to pre admission functional level  Description: INTERVENTIONS:  - Perform BMAT or MOVE assessment daily    - Set and communicate daily mobility goal to care team and patient/family/caregiver  - Collaborate with rehabilitation services on mobility goals if consulted  - Perform Range of Motion  times a day  - Reposition patient every  hours    - Dangle patient  times a day  - Stand patient  times a day  - Ambulate patient  times a day  - Out of bed to chair  times a day   - Out of bed for meals  times a day  - Out of bed for toileting  - Record patient progress and toleration of activity level   Outcome: Progressing     Problem: PAIN - ADULT  Goal: Verbalizes/displays adequate comfort level or baseline comfort level  Description: Interventions:  - Encourage patient to monitor pain and request assistance  - Assess pain using appropriate pain scale  - Administer analgesics based on type and severity of pain and evaluate response  - Implement non-pharmacological measures as appropriate and evaluate response  - Consider cultural and social influences on pain and pain management  - Notify physician/advanced practitioner if interventions unsuccessful or patient reports new pain  Outcome: Progressing     Problem: INFECTION - ADULT  Goal: Absence or prevention of progression during hospitalization  Description: INTERVENTIONS:  - Assess and monitor for signs and symptoms of infection  - Monitor lab/diagnostic results  - Monitor all insertion sites, i e  indwelling lines, tubes, and drains  - Monitor endotracheal if appropriate and nasal secretions for changes in amount and color  - [place] appropriate cooling/warming therapies per order  - Administer medications as ordered  - Instruct and encourage patient and family to use good hand hygiene technique  - Identify and instruct in appropriate isolation precautions for identified infection/condition  Outcome: Progressing     Problem: SAFETY ADULT  Goal: Maintain or return to baseline ADL function  Description: INTERVENTIONS:  -  Assess patient's ability to carry out ADLs; assess patient's baseline for ADL function and identify physical deficits which impact ability to perform ADLs (bathing, care of mouth/teeth, toileting, grooming, dressing, etc )  - Assess/evaluate cause of self-care deficits   - Assess range of motion  - Assess patient's mobility; develop plan if impaired  - Assess patient's need for assistive devices and provide as appropriate  - Encourage maximum independence but intervene and supervise when necessary  - Involve family in performance of ADLs  - Assess for home care needs following discharge   - Consider OT consult to assist with ADL evaluation and planning for discharge  - Provide patient education as appropriate  Outcome: Progressing  Goal: Maintains/Returns to pre admission functional level  Description: INTERVENTIONS:  - Perform BMAT or MOVE assessment daily    - Set and communicate daily mobility goal to care team and patient/family/caregiver     - Collaborate with rehabilitation services on mobility goals if consulted  - Perform Range of Motion  times a day  - Reposition patient every  hours  - Dangle patient  times a day  - Stand patient  times a day  - Ambulate patient  times a day  - Out of bed to chair  times a day   - Out of bed for meals  times a day  - Out of bed for toileting  - Record patient progress and toleration of activity level   Outcome: Progressing  Goal: Patient will remain free of falls  Description: INTERVENTIONS:  - Educate patient/family on patient safety including physical limitations  - Instruct patient to call for assistance with activity   - Consult OT/PT to assist with strengthening/mobility   - Keep Call bell within reach  - Keep bed low and locked with side rails adjusted as appropriate  - Keep care items and personal belongings within reach  - Initiate and maintain comfort rounds  - Make Fall Risk Sign visible to staff  - Offer Toileting every  Hours, in advance of need  - Initiate/Maintain alarm  - Obtain necessary fall risk management equipment:  - Apply yellow socks and bracelet for high fall risk patients  - Consider moving patient to room near nurses station  Outcome: Progressing     Problem: Knowledge Deficit  Goal: Patient/family/caregiver demonstrates understanding of disease process, treatment plan, medications, and discharge instructions  Description: Complete learning assessment and assess knowledge base    Interventions:  - Provide teaching at level of understanding  - Provide teaching via preferred learning methods  Outcome: Progressing     Problem: DISCHARGE PLANNING  Goal: Discharge to home or other facility with appropriate resources  Description: INTERVENTIONS:  - Identify barriers to discharge w/patient and caregiver  - Arrange for needed discharge resources and transportation as appropriate  - Identify discharge learning needs (meds, wound care, etc )  - Arrange for interpretive services to assist at discharge as needed  - Refer to Case Management Department for coordinating discharge planning if the patient needs post-hospital services based on physician/advanced practitioner order or complex needs related to functional status, cognitive ability, or social support system  Outcome: Progressing

## 2022-08-23 NOTE — PLAN OF CARE
Problem: MOBILITY - ADULT  Goal: Maintain or return to baseline ADL function  Description: INTERVENTIONS:  -  Assess patient's ability to carry out ADLs; assess patient's baseline for ADL function and identify physical deficits which impact ability to perform ADLs (bathing, care of mouth/teeth, toileting, grooming, dressing, etc )  - Assess/evaluate cause of self-care deficits   - Assess range of motion  - Assess patient's mobility; develop plan if impaired  - Assess patient's need for assistive devices and provide as appropriate  - Encourage maximum independence but intervene and supervise when necessary  - Involve family in performance of ADLs  - Assess for home care needs following discharge   - Consider OT consult to assist with ADL evaluation and planning for discharge  - Provide patient education as appropriate  Outcome: Progressing  Goal: Maintains/Returns to pre admission functional level  Description: INTERVENTIONS:  - Perform BMAT or MOVE assessment daily    - Set and communicate daily mobility goal to care team and patient/family/caregiver  - Collaborate with rehabilitation services on mobility goals if consulted  - Perform Range of Motion  times a day  - Reposition patient every hours    - Dangle patient  times a day  - Stand patient  times a day  - Ambulate patient  times a day  - Out of bed to chair  times a day   - Out of bed for meals  times a day  - Out of bed for toileting  - Record patient progress and toleration of activity level   Outcome: Progressing     Problem: PAIN - ADULT  Goal: Verbalizes/displays adequate comfort level or baseline comfort level  Description: Interventions:  - Encourage patient to monitor pain and request assistance  - Assess pain using appropriate pain scale  - Administer analgesics based on type and severity of pain and evaluate response  - Implement non-pharmacological measures as appropriate and evaluate response  - Consider cultural and social influences on pain and pain management  - Notify physician/advanced practitioner if interventions unsuccessful or patient reports new pain  Outcome: Progressing     Problem: INFECTION - ADULT  Goal: Absence or prevention of progression during hospitalization  Description: INTERVENTIONS:  - Assess and monitor for signs and symptoms of infection  - Monitor lab/diagnostic results  - Monitor all insertion sites, i e  indwelling lines, tubes, and drains  - Monitor endotracheal if appropriate and nasal secretions for changes in amount and color  - Fries appropriate cooling/warming therapies per order  - Administer medications as ordered  - Instruct and encourage patient and family to use good hand hygiene technique  - Identify and instruct in appropriate isolation precautions for identified infection/condition  Outcome: Progressing     Problem: SAFETY ADULT  Goal: Maintain or return to baseline ADL function  Description: INTERVENTIONS:  -  Assess patient's ability to carry out ADLs; assess patient's baseline for ADL function and identify physical deficits which impact ability to perform ADLs (bathing, care of mouth/teeth, toileting, grooming, dressing, etc )  - Assess/evaluate cause of self-care deficits   - Assess range of motion  - Assess patient's mobility; develop plan if impaired  - Assess patient's need for assistive devices and provide as appropriate  - Encourage maximum independence but intervene and supervise when necessary  - Involve family in performance of ADLs  - Assess for home care needs following discharge   - Consider OT consult to assist with ADL evaluation and planning for discharge  - Provide patient education as appropriate  Outcome: Progressing  Goal: Maintains/Returns to pre admission functional level  Description: INTERVENTIONS:  - Perform BMAT or MOVE assessment daily    - Set and communicate daily mobility goal to care team and patient/family/caregiver     - Collaborate with rehabilitation services on mobility goals if consulted  - Perform Range of Motion  times a day  - Reposition patient every  hours    - Dangle patient  times a day  - Stand patient times a day  - Ambulate patient  times a day  - Out of bed to chair  times a day   - Out of bed for meals  times a day  - Out of bed for toileting  - Record patient progress and toleration of activity level   Outcome: Progressing  Goal: Patient will remain free of falls  Description: INTERVENTIONS:  - Educate patient/family on patient safety including physical limitations  - Instruct patient to call for assistance with activity   - Consult OT/PT to assist with strengthening/mobility   - Keep Call bell within reach  - Keep bed low and locked with side rails adjusted as appropriate  - Keep care items and personal belongings within reach  - Initiate and maintain comfort rounds  - Make Fall Risk Sign visible to staff  - Offer Toileting every  Hours, in advance of need  - Initiate/Maintain alarm  - Obtain necessary fall risk management equipment:   - Apply yellow socks and bracelet for high fall risk patients  - Consider moving patient to room near nurses station  Outcome: Progressing     Problem: DISCHARGE PLANNING  Goal: Discharge to home or other facility with appropriate resources  Description: INTERVENTIONS:  - Identify barriers to discharge w/patient and caregiver  - Arrange for needed discharge resources and transportation as appropriate  - Identify discharge learning needs (meds, wound care, etc )  - Arrange for interpretive services to assist at discharge as needed  - Refer to Case Management Department for coordinating discharge planning if the patient needs post-hospital services based on physician/advanced practitioner order or complex needs related to functional status, cognitive ability, or social support system  Outcome: Progressing     Problem: Knowledge Deficit  Goal: Patient/family/caregiver demonstrates understanding of disease process, treatment plan, medications, and discharge instructions  Description: Complete learning assessment and assess knowledge base  Interventions:  - Provide teaching at level of understanding  - Provide teaching via preferred learning methods  Outcome: Progressing     Problem: Nutrition/Hydration-ADULT  Goal: Nutrient/Hydration intake appropriate for improving, restoring or maintaining nutritional needs  Description: Monitor and assess patient's nutrition/hydration status for malnutrition  Collaborate with interdisciplinary team and initiate plan and interventions as ordered  Monitor patient's weight and dietary intake as ordered or per policy  Utilize nutrition screening tool and intervene as necessary  Determine patient's food preferences and provide high-protein, high-caloric foods as appropriate       INTERVENTIONS:  - Monitor oral intake, urinary output, labs, and treatment plans  - Assess nutrition and hydration status and recommend course of action  - Evaluate amount of meals eaten  - Assist patient with eating if necessary   - Allow adequate time for meals  - Recommend/ encourage appropriate diets, oral nutritional supplements, and vitamin/mineral supplements  - Order, calculate, and assess calorie counts as needed  - Recommend, monitor, and adjust tube feedings and TPN/PPN based on assessed needs  - Assess need for intravenous fluids  - Provide specific nutrition/hydration education as appropriate  - Include patient/family/caregiver in decisions related to nutrition  Outcome: Progressing     Problem: Prexisting or High Potential for Compromised Skin Integrity  Goal: Skin integrity is maintained or improved  Description: INTERVENTIONS:  - Identify patients at risk for skin breakdown  - Assess and monitor skin integrity  - Assess and monitor nutrition and hydration status  - Monitor labs   - Assess for incontinence   - Turn and reposition patient  - Assist with mobility/ambulation  - Relieve pressure over bony prominences  - Avoid friction and shearing  - Provide appropriate hygiene as needed including keeping skin clean and dry  - Evaluate need for skin moisturizer/barrier cream  - Collaborate with interdisciplinary team   - Patient/family teaching  - Consider wound care consult   Outcome: Progressing     Problem: Potential for Falls  Goal: Patient will remain free of falls  Description: INTERVENTIONS:  - Educate patient/family on patient safety including physical limitations  - Instruct patient to call for assistance with activity   - Consult OT/PT to assist with strengthening/mobility   - Keep Call bell within reach  - Keep bed low and locked with side rails adjusted as appropriate  - Keep care items and personal belongings within reach  - Initiate and maintain comfort rounds  - Make Fall Risk Sign visible to staff  - Offer Toileting every  Hours, in advance of need  - Initiate/Maintain alarm  - Obtain necessary fall risk management equipment:   - Apply yellow socks and bracelet for high fall risk patients  - Consider moving patient to room near nurses station  Outcome: Progressing     Problem: NEUROSENSORY - ADULT  Goal: Achieves stable or improved neurological status  Description: INTERVENTIONS  - Monitor and report changes in neurological status  - Monitor vital signs such as temperature, blood pressure, glucose, and any other labs ordered   - Initiate measures to prevent increased intracranial pressure  - Monitor for seizure activity and implement precautions if appropriate      Outcome: Progressing     Problem: CARDIOVASCULAR - ADULT  Goal: Maintains optimal cardiac output and hemodynamic stability  Description: INTERVENTIONS:  - Monitor I/O, vital signs and rhythm  - Monitor for S/S and trends of decreased cardiac output  - Administer and titrate ordered vasoactive medications to optimize hemodynamic stability  - Assess quality of pulses, skin color and temperature  - Assess for signs of decreased coronary artery perfusion  - Instruct patient to report change in severity of symptoms  Outcome: Progressing  Goal: Absence of cardiac dysrhythmias or at baseline rhythm  Description: INTERVENTIONS:  - Continuous cardiac monitoring, vital signs, obtain 12 lead EKG if ordered  - Administer antiarrhythmic and heart rate control medications as ordered  - Monitor electrolytes and administer replacement therapy as ordered  Outcome: Progressing     Problem: RESPIRATORY - ADULT  Goal: Achieves optimal ventilation and oxygenation  Description: INTERVENTIONS:  - Assess for changes in respiratory status  - Assess for changes in mentation and behavior  - Position to facilitate oxygenation and minimize respiratory effort  - Oxygen administered by appropriate delivery if ordered  - Initiate smoking cessation education as indicated  - Encourage broncho-pulmonary hygiene including cough, deep breathe, Incentive Spirometry  - Assess the need for suctioning and aspirate as needed  - Assess and instruct to report SOB or any respiratory difficulty  - Respiratory Therapy support as indicated  Outcome: Progressing     Problem: GASTROINTESTINAL - ADULT  Goal: Minimal or absence of nausea and/or vomiting  Description: INTERVENTIONS:  - Administer IV fluids if ordered to ensure adequate hydration  - Maintain NPO status until nausea and vomiting are resolved  - Nasogastric tube if ordered  - Administer ordered antiemetic medications as needed  - Provide nonpharmacologic comfort measures as appropriate  - Advance diet as tolerated, if ordered  - Consider nutrition services referral to assist patient with adequate nutrition and appropriate food choices  Outcome: Progressing  Goal: Maintains or returns to baseline bowel function  Description: INTERVENTIONS:  - Assess bowel function  - Encourage oral fluids to ensure adequate hydration  - Administer IV fluids if ordered to ensure adequate hydration  - Administer ordered medications as needed  - Encourage mobilization and activity  - Consider nutritional services referral to assist patient with adequate nutrition and appropriate food choices  Outcome: Progressing  Goal: Maintains adequate nutritional intake  Description: INTERVENTIONS:  - Monitor percentage of each meal consumed  - Identify factors contributing to decreased intake, treat as appropriate  - Assist with meals as needed  - Monitor I&O, weight, and lab values if indicated  - Obtain nutrition services referral as needed  Outcome: Progressing     Problem: GENITOURINARY - ADULT  Goal: Maintains or returns to baseline urinary function  Description: INTERVENTIONS:  - Assess urinary function  - Encourage oral fluids to ensure adequate hydration if ordered  - Administer IV fluids as ordered to ensure adequate hydration  - Administer ordered medications as needed  - Offer frequent toileting  - Follow urinary retention protocol if ordered  Outcome: Progressing     Problem: METABOLIC, FLUID AND ELECTROLYTES - ADULT  Goal: Electrolytes maintained within normal limits  Description: INTERVENTIONS:  - Monitor labs and assess patient for signs and symptoms of electrolyte imbalances  - Administer electrolyte replacement as ordered  - Monitor response to electrolyte replacements, including repeat lab results as appropriate  - Instruct patient on fluid and nutrition as appropriate  Outcome: Progressing  Goal: Fluid balance maintained  Description: INTERVENTIONS:  - Monitor labs   - Monitor I/O and WT  - Instruct patient on fluid and nutrition as appropriate  - Assess for signs & symptoms of volume excess or deficit  Outcome: Progressing     Problem: SKIN/TISSUE INTEGRITY - ADULT  Goal: Skin Integrity remains intact(Skin Breakdown Prevention)  Description: Assess:  -Perform Nate assessment every   -Clean and moisturize skin every   -Inspect skin when repositioning, toileting, and assisting with ADLS  -Assess under medical devices such as  every   -Assess extremities for adequate circulation and sensation     Bed Management:  -Have minimal linens on bed & keep smooth, unwrinkled  -Change linens as needed when moist or perspiring  -Avoid sitting or lying in one position for more than hours while in bed  -Keep HOB at degrees     Toileting:  -Offer bedside commode  -Assess for incontinence every   -Use incontinent care products after each incontinent episode such as     Activity:  -Mobilize patient  times a day  -Encourage activity and walks on unit  -Encourage or provide ROM exercises   -Turn and reposition patient every  Hours  -Use appropriate equipment to lift or move patient in bed  -Instruct/ Assist with weight shifting every  when out of bed in chair  -Consider limitation of chair time  hour intervals    Skin Care:  -Avoid use of baby powder, tape, friction and shearing, hot water or constrictive clothing  -Relieve pressure over bony prominences using  -Do not massage red bony areas    Next Steps:  -Teach patient strategies to minimize risks such as    -Consider consults to  interdisciplinary teams such as   Outcome: Progressing  Goal: Incision(s), wounds(s) or drain site(s) healing without S/S of infection  Description: INTERVENTIONS  - Assess and document dressing, incision, wound bed, drain sites and surrounding tissue  - Provide patient and family education  - Perform skin care/dressing changes   Outcome: Progressing     Problem: HEMATOLOGIC - ADULT  Goal: Maintains hematologic stability  Description: INTERVENTIONS  - Assess for signs and symptoms of bleeding or hemorrhage  - Monitor labs  - Administer supportive blood products/factors as ordered and appropriate  Outcome: Progressing     Problem: MUSCULOSKELETAL - ADULT  Goal: Maintain or return mobility to safest level of function  Description: INTERVENTIONS:  - Assess patient's ability to carry out ADLs; assess patient's baseline for ADL function and identify physical deficits which impact ability to perform ADLs (bathing, care of mouth/teeth, toileting, grooming, dressing, etc )  - Assess/evaluate cause of self-care deficits   - Assess range of motion  - Assess patient's mobility  - Assess patient's need for assistive devices and provide as appropriate  - Encourage maximum independence but intervene and supervise when necessary  - Involve family in performance of ADLs  - Assess for home care needs following discharge   - Consider OT consult to assist with ADL evaluation and planning for discharge  - Provide patient education as appropriate  Outcome: Progressing     Problem: COPING  Goal: Pt/Family able to verbalize concerns and demonstrate effective coping strategies  Description: INTERVENTIONS:  - Assist patient/family to identify coping skills, available support systems and cultural and spiritual values  - Provide emotional support, including active listening and acknowledgement of concerns of patient and caregivers  - Reduce environmental stimuli, as able  - Provide patient education  - Assess for spiritual pain/suffering and initiate spiritual care, including notification of Pastoral Care or florencio based community as needed  - Assess effectiveness of coping strategies  Outcome: Progressing  Goal: Will report anxiety at manageable levels  Description: INTERVENTIONS:  - Administer medication as ordered  - Teach and encourage coping skills  - Provide emotional support  - Assess patient/family for anxiety and ability to cope  Outcome: Progressing

## 2022-08-23 NOTE — UTILIZATION REVIEW
Inpatient Admission Authorization Request   NOTIFICATION OF INPATIENT ADMISSION/INPATIENT AUTHORIZATION REQUEST   SERVICING FACILITY:   [facility]  Address: [address] [place] [address], [address] [place] [address]  Tax ID: [id number]  NPI: [id number]  Place of Service: Inpatient [address] [facility] Code: 24     ATTENDING PROVIDER:  Attending Name and NPI#: Lyndsey Shankar [[id number]]  Address: [address] [place] [address], [address] [place] [id number]  Phone: [phone]     UTILIZATION REVIEW CONTACT:  Kevin Covington, Utilization   Network Utilization Review Department  Phone: [phone]  Fax: [phone]  Email: [email or web address]     PHYSICIAN ADVISORY SERVICES:  FOR [id number] REVIEW - MEDICAL NECESSITY DENIAL  Phone: [phone]  Fax: [phone]  Email: [email or web address]     TYPE OF REQUEST:  Inpatient Status     ADMISSION INFORMATION:  ADMISSION DATE/TIME: 8/21/22 10:38 AM  PATIENT DIAGNOSIS CODE/DESCRIPTION:  Nasal bone fracture [S02  2XXA]  Closed nondisplaced fracture of second cervical vertebra, unspecified fracture morphology, initial encounter ([facility] ) [S12 101A]  DISCHARGE DATE/TIME: No discharge date for patient encounter  IMPORTANT INFORMATION:  Please contact Kevin Covington directly with any questions or concerns regarding this request  Department voicemails are confidential     Send requests for admission clinical reviews, concurrent reviews, approvals, and administrative denials due to lack of clinical to fax [phone]

## 2022-08-23 NOTE — PROGRESS NOTES
[facility]  Progress Note Kendall Hayes 1960, 57 y o  female MRN: [id number]  Unit/Bed#: [facility] 627-01 Encounter: [id number]  Primary Care Provider: No primary care provider on file  Date and time admitted to hospital: 8/21/2022  9:49 AM    Acute blood loss anemia  Assessment & Plan  · Likely 2/2 scalp laceration losses  · S/p 1u PRBC with appropriate response and stability  · Daily CBC    MGUS (monoclonal gammopathy of unknown significance)  Assessment & Plan  · Per Epic review, MGUS w/ h/o recurrent DVTs on Eliquis/ASA      Pulmonary emphysema (HCC)  Assessment & Plan  · Arrived to [facility] on 3L supplemental O2 sating 100%  · PRN bronchodilators  · Q1hr I/S use  · 1PPD smoker  · Nicotine patch  · On 6L face tent currently -- continue to wean  · Goal SpO2 >88%    DVT (deep venous thrombosis) (HCC)  Assessment & Plan  · On Eliquis for DVT   · Hold in the setting of recent ABLA  · Resume home eliquis today    Fall  Assessment & Plan  · 8/21 mechanical fall d/t ambulatory dysfunction and Parkinson's disease  · No home assistive devices for ambulation  · PT OT consulted      Scalp laceration, initial encounter  Assessment & Plan  · 11cm scalp laceration after fall  · S/p staple repair at OSH 8/21  · Staple removal 10-14 days  · Outpatient follow up trauma clinic    Unspecified injury of left vertebral artery, initial encounter  Assessment & Plan  8/21 CTA neck: No vascular enhancement of left vertebral artery in the neck  However, in this patient with no acute/persistent neurologic symptoms, the appearance is most suggestive of anatomic variation rather than acute traumatic left vertebral artery injury, specifically as there is no enhancement even of left vertebral artery origin  There is enhancement of left vertebral artery and left posterior cerebellar artery above the level of foramen magnum presumably via collateral circulation across vertebral basilar junction  There are patent posterior communicating arteries bilaterally and the basilar artery is somewhat diminutive consistent with anatomic variation  Neurosurgery consulted  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      Nasal fracture  Assessment & Plan  · 8/21 CT facial bones: nasal bone fracture  · Unasyn for PPX  · S/p operative fixation by OMFS on 8/22/22    C3 cervical fracture ([facility] )  Assessment & Plan  As per C2 cervical fracture plan:      Parkinson disease ([facility] )  Assessment & Plan  · Takes carbidopa-levodopa-entacapone (STALEVO) -200 MG per tablet  · paxil  · Propranolol for tremor    * C2 cervical fracture (HCC)  Assessment & Plan  · 8/21 CT cspine: anterioinferior C2 fx, L transverse process fx C3, old L clavicle fx  · Neurosurgery consulted  · Appreciate recommendations  · Cervical spine precautions  · Non-op in cervical collar, upright imaging          Disposition: continue current level of care    SUBJECTIVE:    Subjective: Requiring face tent to maintain oxygen saturations  No complaints on exam this morning  OBJECTIVE:   Vitals:   Temp:  [97 7 °F (36 5 °C)-98 5 °F (36 9 °C)] 97 8 °F (36 6 °C)  HR:  [70-86] 73  Resp:  [18-28] 18  BP: ()/() 117/65  FiO2 (%):  [35-60] 35    Intake/Output:  I/O       08/21 0701  08/22 0700 08/22 0701  08/23 0700 08/23 0701  08/24 0700    P  O  200 270     I V  (mL/kg) 1090 (25 3) 400 (8 4)     Blood 449 2      IV Piggyback 500      Total Intake(mL/kg) 2239 2 (52) 670 (14)     Urine (mL/kg/hr) 700 600 (0 5)     Total Output 700 600     Net +1539 2 +70            Unmeasured Urine Occurrence  3 x 1 x         Nutrition: Diet Regular; Regular House  VTE Prophylaxis:resume home eliquis     Physical Exam:   Gen:  NAD  HENT: cervical collar in place   stapled scalp laceration  CV:  RRR  Lungs:  nl effort on 6L face tent, SpO2 93-94%  Abd:  soft, NT/ND  Ext: no CCE  Skin:  no rashes  Neuro:  A&Ox3, tremor present    Invasive Devices  Report    Peripheral Intravenous Line  Duration           Peripheral IV 08/21/22 Left;Proximal;Ventral (anterior) Forearm 2 days    Peripheral IV 08/22/22 Right;Upper;Ventral (anterior) Arm 1 day                      Lab Results:   Results: I have personally reviewed all pertinent laboratory/tests results, BMP/CMP:   Lab Results   Component Value Date    SODIUM 137 08/23/2022    K 4 1 08/23/2022     08/23/2022    CO2 30 08/23/2022    BUN 10 08/23/2022    CREATININE 0 70 08/23/2022    CALCIUM 8 3 08/23/2022    EGFR 93 08/23/2022    and CBC:   Lab Results   Component Value Date    WBC 5 80 08/23/2022    HGB 9 4 (L) 08/23/2022    HCT 30 6 (L) 08/23/2022    MCV 95 08/23/2022     08/23/2022    MCH 29 1 08/23/2022    MCHC 30 7 (L) 08/23/2022    RDW 18 4 (H) 08/23/2022    MPV 11 7 08/23/2022    NRBC 0 08/23/2022     Imaging/EKG Studies: I have personally reviewed pertinent reports  Other Studies: I have personally reviewed pertinent reports

## 2022-08-23 NOTE — RESTORATIVE TECHNICIAN NOTE
Restorative Technician Note      Patient Name: Santiago Voss     Note Type: Bracing, Follow-up fitting  Patient Position Upon Consult: Supine  Brace Applied: [address] Set (lvl3)  Additional Brace Ordered: No  Patient Position When Brace Applied: Supine  Bracing Recommendations: None  Education Provided: Yes  Patient Position at End of Consult: Supine; All needs within reach; Bed/Chair alarm activated    Alesia collar, cervical handout, and extra pads at bedside    Please call Mobility Coordinator at ext  [phone] or on [place] text " [facility]-PT-Restorative Tech" role in regards to bracing instruction and/or adjustment      Sai Saleem Restorative Technician, BS

## 2022-08-23 NOTE — PROGRESS NOTES
Patient report has been given to [facility], RN from the [address] [place] [address] [address] Nursing unit  The patient will be transferred to Room 627 as a Level 2 Stepdown status patient  The patient's  is aware of the patient's upcoming transfer

## 2022-08-23 NOTE — ASSESSMENT & PLAN NOTE
· 8/21 CT facial bones: nasal bone fracture  · Unasyn for PPX  · S/p operative fixation by OMFS on 8/22/22

## 2022-08-23 NOTE — CASE MANAGEMENT
Case Management Discharge Planning Note    Patient name Prasanna Castellanos  Location [address]27/PPHP 086-28 MRN [id number]  : 1960 Date 2022       Current Admission Date: 2022  Current Admission Diagnosis:C2 cervical fracture [facility])   Patient Active Problem List    Diagnosis Date Noted    Acute blood loss anemia 2022    Parkinson disease ([facility] ) 2022    C2 cervical fracture ([place] [address] ) 2022    C3 cervical fracture ([place] [address] ) 2022    Nasal fracture 2022    Unspecified injury of left vertebral artery, initial encounter 2022    Scalp laceration, initial encounter 2022    Fall 2022    DVT (deep venous thrombosis) ([place] [address] ) 2022    Pulmonary emphysema (HCC)     MGUS (monoclonal gammopathy of unknown significance)       LOS (days): 2  Geometric Mean LOS (GMLOS) (days):   Days to GMLOS:     OBJECTIVE:  Risk of Unplanned Readmission Score: 13 9         Current admission status: Inpatient   Preferred Pharmacy:   [facility] [address] [facility] [address] [place] [address]  Phone: [phone] Fax: [phone]    Primary Care Provider: No primary care provider on file  Primary Insurance: Nehal Hanley  Secondary Insurance:     DISCHARGE DETAILS:    [address]         Is the patient interested in [facility] at discharge?: No    DME Referral Provided  Referral made for DME?: No    Other Referral/Resources/Interventions Provided:  Interventions: Short Term Rehab         Treatment Team Recommendation: Short Term Rehab  Discharge Destination Plan[de-identified] Short Term Rehab    Pt recommended for IP rehab  CM discussed with pt  Her first choice is [facility] but if they can't accept then she is in agreement to any SNF in her area  CM placed referrals

## 2022-08-23 NOTE — ASSESSMENT & PLAN NOTE
8/21 CTA neck: No vascular enhancement of left vertebral artery in the neck  However, in this patient with no acute/persistent neurologic symptoms, the appearance is most suggestive of anatomic variation rather than acute traumatic left vertebral artery injury, specifically as there is no enhancement even of left vertebral artery origin  There is enhancement of left vertebral artery and left posterior cerebellar artery above the level of foramen magnum presumably via collateral circulation across vertebral basilar junction  There are patent posterior communicating arteries bilaterally and the basilar artery is somewhat diminutive consistent with anatomic variation       Neurosurgery consulted  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

## 2022-08-24 LAB
ALBUMIN SERPL BCP-MCNC: 2.4 G/DL (ref 3.5–5)
ALP SERPL-CCNC: 162 U/L (ref 46–116)
ALT SERPL W P-5'-P-CCNC: 16 U/L (ref 12–78)
ANION GAP SERPL CALCULATED.3IONS-SCNC: 3 MMOL/L (ref 4–13)
AST SERPL W P-5'-P-CCNC: 48 U/L (ref 5–45)
BILIRUB SERPL-MCNC: 0.69 MG/DL (ref 0.2–1)
BUN SERPL-MCNC: 10 MG/DL (ref 5–25)
CALCIUM ALBUM COR SERPL-MCNC: 9.9 MG/DL (ref 8.3–10.1)
CALCIUM SERPL-MCNC: 8.6 MG/DL (ref 8.3–10.1)
CHLORIDE SERPL-SCNC: 104 MMOL/L (ref 96–108)
CO2 SERPL-SCNC: 31 MMOL/L (ref 21–32)
CREAT SERPL-MCNC: 0.72 MG/DL (ref 0.6–1.3)
ERYTHROCYTE [DISTWIDTH] IN BLOOD BY AUTOMATED COUNT: 18.6 % (ref 11.6–15.1)
GFR SERPL CREATININE-BSD FRML MDRD: 90 ML/MIN/1.73SQ M
GLUCOSE SERPL-MCNC: 108 MG/DL (ref 65–140)
HCT VFR BLD AUTO: 31.6 % (ref 34.8–46.1)
HGB BLD-MCNC: 9.7 G/DL (ref 11.5–15.4)
MCH RBC QN AUTO: 28.6 PG (ref 26.8–34.3)
MCHC RBC AUTO-ENTMCNC: 30.7 G/DL (ref 31.4–37.4)
MCV RBC AUTO: 93 FL (ref 82–98)
PLATELET # BLD AUTO: 172 THOUSANDS/UL (ref 149–390)
PMV BLD AUTO: 12 FL (ref 8.9–12.7)
POTASSIUM SERPL-SCNC: 3.8 MMOL/L (ref 3.5–5.3)
PROT SERPL-MCNC: 5.9 G/DL (ref 6.4–8.4)
RBC # BLD AUTO: 3.39 MILLION/UL (ref 3.81–5.12)
SODIUM SERPL-SCNC: 138 MMOL/L (ref 135–147)
WBC # BLD AUTO: 5.64 THOUSAND/UL (ref 4.31–10.16)

## 2022-08-24 PROCEDURE — 94664 DEMO&/EVAL PT USE INHALER: CPT

## 2022-08-24 PROCEDURE — 97530 THERAPEUTIC ACTIVITIES: CPT

## 2022-08-24 PROCEDURE — 99232 SBSQ HOSP IP/OBS MODERATE 35: CPT | Performed by: EMERGENCY MEDICINE

## 2022-08-24 PROCEDURE — 94668 MNPJ CHEST WALL SBSQ: CPT

## 2022-08-24 PROCEDURE — 85027 COMPLETE CBC AUTOMATED: CPT | Performed by: SURGERY

## 2022-08-24 PROCEDURE — 80053 COMPREHEN METABOLIC PANEL: CPT | Performed by: SURGERY

## 2022-08-24 PROCEDURE — 99223 1ST HOSP IP/OBS HIGH 75: CPT | Performed by: INTERNAL MEDICINE

## 2022-08-24 RX ADMIN — GABAPENTIN 300 MG: 300 CAPSULE ORAL at 21:56

## 2022-08-24 RX ADMIN — GABAPENTIN 300 MG: 300 CAPSULE ORAL at 17:13

## 2022-08-24 RX ADMIN — AMPICILLIN SODIUM AND SULBACTAM SODIUM 3 G: 2; 1 INJECTION, POWDER, FOR SOLUTION INTRAMUSCULAR; INTRAVENOUS at 09:48

## 2022-08-24 RX ADMIN — APIXABAN 5 MG: 5 TABLET, FILM COATED ORAL at 09:38

## 2022-08-24 RX ADMIN — ASPIRIN 81 MG: 81 TABLET, COATED ORAL at 09:38

## 2022-08-24 RX ADMIN — AMPICILLIN SODIUM AND SULBACTAM SODIUM 3 G: 2; 1 INJECTION, POWDER, FOR SOLUTION INTRAMUSCULAR; INTRAVENOUS at 17:13

## 2022-08-24 RX ADMIN — AMPICILLIN SODIUM AND SULBACTAM SODIUM 3 G: 2; 1 INJECTION, POWDER, FOR SOLUTION INTRAMUSCULAR; INTRAVENOUS at 21:56

## 2022-08-24 RX ADMIN — NICOTINE 14 MG: 14 PATCH, EXTENDED RELEASE TRANSDERMAL at 09:38

## 2022-08-24 RX ADMIN — CARBIDOPA AND LEVODOPA 2 TABLET: 25; 100 TABLET ORAL at 13:28

## 2022-08-24 RX ADMIN — CARBIDOPA AND LEVODOPA 2 TABLET: 25; 100 TABLET ORAL at 17:13

## 2022-08-24 RX ADMIN — LIDOCAINE 5% 1 PATCH: 700 PATCH TOPICAL at 09:38

## 2022-08-24 RX ADMIN — ENTACAPONE 200 MG: 200 TABLET, FILM COATED ORAL at 09:40

## 2022-08-24 RX ADMIN — ENTACAPONE 200 MG: 200 TABLET, FILM COATED ORAL at 13:29

## 2022-08-24 RX ADMIN — CARBIDOPA AND LEVODOPA 2 TABLET: 25; 100 TABLET ORAL at 21:55

## 2022-08-24 RX ADMIN — ACETAMINOPHEN 975 MG: 325 TABLET ORAL at 13:28

## 2022-08-24 RX ADMIN — ENTACAPONE 200 MG: 200 TABLET, FILM COATED ORAL at 21:56

## 2022-08-24 RX ADMIN — PROPRANOLOL HYDROCHLORIDE 160 MG: 80 CAPSULE, EXTENDED RELEASE ORAL at 09:39

## 2022-08-24 RX ADMIN — ENTACAPONE 200 MG: 200 TABLET, FILM COATED ORAL at 17:13

## 2022-08-24 RX ADMIN — PAROXETINE HYDROCHLORIDE 20 MG: 20 TABLET, FILM COATED ORAL at 09:38

## 2022-08-24 RX ADMIN — ACETAMINOPHEN 975 MG: 325 TABLET ORAL at 05:07

## 2022-08-24 RX ADMIN — CARBIDOPA AND LEVODOPA 2 TABLET: 25; 100 TABLET ORAL at 09:38

## 2022-08-24 RX ADMIN — ACETAMINOPHEN 975 MG: 325 TABLET ORAL at 21:57

## 2022-08-24 RX ADMIN — SENNOSIDES AND DOCUSATE SODIUM 2 TABLET: 50; 8.6 TABLET ORAL at 09:38

## 2022-08-24 RX ADMIN — GABAPENTIN 300 MG: 300 CAPSULE ORAL at 09:38

## 2022-08-24 RX ADMIN — APIXABAN 5 MG: 5 TABLET, FILM COATED ORAL at 17:13

## 2022-08-24 RX ADMIN — AMPICILLIN SODIUM AND SULBACTAM SODIUM 3 G: 2; 1 INJECTION, POWDER, FOR SOLUTION INTRAMUSCULAR; INTRAVENOUS at 04:40

## 2022-08-24 NOTE — CONSULTS
Pulmonary Consultation   Raul Cope 57 y o  female MRN: [id number]   [address] Encounter: [id number]      Reason for consultation:   COPD management      Requesting physician:   Prasanna Orta MD      Impressions:    Pulmonary emphysema   Chronic hypoxemic respiratory failure   Extensive smoking history with ongoing smoking   Status post fall with cervical spine fracture and nasal fracture   Parkinson's disease  Recommendations:   Smoking cessation  I had a long talk with the patient and her  regarding smoking cessation  Unfortunately she does not seem to be motivated to quit smoking  I have explained to her that her breathing and lung function will continue to due to rate if she does not stop smoking   The patient most likely will need home oxygen  She needs home oxygen requirement evaluation before discharge   Outpatient complete pulmonary function test    The patient will not be able to take inhalers given her Parkinson's disease  She will benefit from nebulized medications  On discharge she will need a nebulizer machine, budesonide 0 5 mg nebulized twice a day and Perforomist 20 mcg nebulized twice a day  She will also need ipratropium/albuterol nebulizer 4 times a day as needed   Follow-up as outpatient in our office [facility]  History of Present Illness   HPI:  Raul Cope is a 57 y o  female who was admitted because of a fall and trauma to her head and neck  She was found to have cervical spine fracture as well as nasal fracture  She was transferred from the [facility] to [place] for further management  A Pulmonary consult was requested to help with the management of COPD  The patient has memory issues and history was obtained from the patient as well as her  was at the bedside  The patient has extensive smoking history for over 40 years  Usually she smokes a pack a day but for good 10-15 years she smoked 2 packs a day    She was diagnosed with emphysema in the past   She will ordered an inhaler from a friend or family member which she has been using as needed  Her  told me that it helps her breathe better  She was not told that her oxygen level was low in the past     Review of systems:  12 point review of systems was completed and was otherwise negative except as listed in HPI  Historical Information   Past Medical History:   Diagnosis Date    Basedow's disease     Disease of thyroid gland     DVT (deep venous thrombosis) (HCC)     MGUS (monoclonal gammopathy of unknown significance)     Pulmonary emphysema (HCC)      Past Surgical History:   Procedure Laterality Date    CLOSED REDUCTION NASAL FRACTURE Bilateral 8/22/2022    Procedure: CLOSED REDUCTION NASAL FRACTURE;  Surgeon: Zachary Shipman DMD;  Location: [facility];  Service: Maxillofacial     History reviewed  No pertinent family history  Family History:  Her mother had emphysema and she was a smoker  Social History:  The patient is  and lives with her   She worked as a   She has over 60 pack year smoking history and continues to smoke a pack a day  She has a dog and a cat  Her  is a         Meds/Allergies   Current Facility-Administered Medications   Medication Dose Route Frequency    acetaminophen (TYLENOL) tablet 975 mg  975 mg Oral Q8H [address]    albuterol inhalation solution 2 5 mg  2 5 mg Nebulization Q6H PRN    ampicillin-sulbactam (UNASYN) 3 g in sodium chloride 0 9 % 100 mL IVPB  3 g Intravenous Q6H    apixaban (ELIQUIS) tablet 5 mg  5 mg Oral BID    aspirin (ECOTRIN LOW STRENGTH) EC tablet 81 mg  81 mg Oral Daily    carbidopa-levodopa (SINEMET)  mg per tablet 2 tablet  2 tablet Oral 4x Daily    And    entacapone (COMTAN) tablet 200 mg  200 mg Oral 4x Daily    gabapentin (NEURONTIN) capsule 300 mg  300 mg Oral TID    HYDROmorphone HCl (DILAUDID) injection 0 2 mg  0 2 mg Intravenous Q4H PRN  lidocaine (LIDODERM) 5 % patch 1 patch  1 patch Topical Daily    morphine injection 2 mg  2 mg Intravenous Q3H PRN    nicotine (NICODERM CQ) 14 mg/24hr TD 24 hr patch 14 mg  14 mg Transdermal Daily    ondansetron (ZOFRAN) injection 4 mg  4 mg Intravenous Q4H PRN    oxyCODONE (ROXICODONE) IR tablet 2 5 mg  2 5 mg Oral Q4H PRN    oxyCODONE (ROXICODONE) IR tablet 5 mg  5 mg Oral Q4H PRN    PARoxetine (PAXIL) tablet 20 mg  20 mg Oral Daily    propranolol (INDERAL LA) 24 hr capsule 160 mg  160 mg Oral Daily    senna-docusate sodium (SENOKOT S) 8 6-50 mg per tablet 2 tablet  2 tablet Oral BID     Medications Prior to Admission   Medication    apixaban (ELIQUIS) 5 mg    aspirin 81 mg chewable tablet    carbidopa-levodopa-entacapone (STALEVO) -200 MG per tablet    gabapentin (NEURONTIN) 300 mg capsule    PARoxetine (PAXIL) 20 mg tablet    propranolol (INNOPRAN XL) 120 MG 24 hr capsule     No Known Allergies    Vitals: Blood pressure 114/55, pulse 76, temperature 98 4 °F (36 9 °C), resp  rate 16, height 5' 1" (1 549 m), weight 46 kg (101 lb 8 oz), SpO2 (!) 89 % ,      Intake/Output Summary (Last 24 hours) at 8/24/2022 1452  Last data filed at 8/24/2022 1018  Gross per 24 hour   Intake 550 ml   Output --   Net 550 ml       Physical exam:        Head/eyes:    Normocephalic  She has a nasal splint in place  PERRL, extraocular muscles intact, no scleral icterus    Nose:   Nares normal, septum midline, mucosa normal, no drainage    or sinus tenderness   Throat:   Moist mucous membranes, no thrush   Neck:   C-collar in place  Lungs:     Diminished breath sounds  No wheezing or rhonchi          Heart:    Regular rate and rhythm, S1 and S2 normal, no murmur, rub   or gallop   Abdomen:     Soft, non-tender, bowel sounds active all four quadrants,     no masses, no organomegaly   Extremities:   Extremities normal, atraumatic, no cyanosis or edema   Skin:   Warm, dry, turgor normal, no rashes or lesions   Neurologic:   CNII-XII intact, normal strength, non-focal             Labs:   CBC:   Lab Results   Component Value Date    WBC 5 64 08/24/2022    HGB 9 7 (L) 08/24/2022    HCT 31 6 (L) 08/24/2022    MCV 93 08/24/2022     08/24/2022    MCH 28 6 08/24/2022    MCHC 30 7 (L) 08/24/2022    RDW 18 6 (H) 08/24/2022    MPV 12 0 08/24/2022   , CMP:   Lab Results   Component Value Date    SODIUM 138 08/24/2022    K 3 8 08/24/2022     08/24/2022    CO2 31 08/24/2022    BUN 10 08/24/2022    CREATININE 0 72 08/24/2022    CALCIUM 8 6 08/24/2022    AST 48 (H) 08/24/2022    ALT 16 08/24/2022    ALKPHOS 162 (H) 08/24/2022    EGFR 90 08/24/2022       Imaging and other studies: I have personally reviewed pertinent films in PACS chest x-rays reviewed on the [facility] system  It showed hyperinflated lungs  The lung windows on the CT of the neck showed centrilobular emphysema          Debra Lora MD

## 2022-08-24 NOTE — PLAN OF CARE
Problem: PHYSICAL THERAPY ADULT  Goal: Performs mobility at highest level of function for planned discharge setting  See evaluation for individualized goals  Description: Treatment/Interventions: Functional transfer training, LE strengthening/ROM, Elevations, Therapeutic exercise, Endurance training, Equipment eval/education, Bed mobility, Gait training, Spoke to nursing, OT  Equipment Recommended:  (r/o rw)       See flowsheet documentation for full assessment, interventions and recommendations  Outcome: Progressing  Note: Prognosis: Good  Problem List: Decreased strength, Decreased endurance, Impaired balance, Decreased mobility  Assessment: Pt demosntrated improved transfer ability today, performing all tasks with reduced assist & no LOB noted despite tremors throughout session  Pt safely managed RW to pivot to reclienr without LOB  further ambulation limited due to presence of supplemental O2 by way of tent mask & no adaptor to put pt on portable O2 during session  Unable to use nasal canulla due to recent nasal fracture & subsequent surgery  Attempted sitting on RA, but pt's SpO2 dropped form 94-5% to 87%  Anticipate pt will quickly progress ambulatory distances based on strength demonstrated today pending cardiopulmonary status in upcoming sessions  Will follow up with respiratory therapy to address O2 requirements prior to next session prn  POC & d/c plan remain appropraite at this time  Barriers to Discharge: Inaccessible home environment     PT Discharge Recommendation: Post acute rehabilitation services    See flowsheet documentation for full assessment

## 2022-08-24 NOTE — ASSESSMENT & PLAN NOTE
· 8/21 CT facial bones: nasal bone fracture  · Unasyn for PPX  · S/p operative fixation by OMFS on 8/22/22  · Sinus precautions

## 2022-08-24 NOTE — PLAN OF CARE
Problem: MOBILITY - ADULT  Goal: Maintain or return to baseline ADL function  Description: INTERVENTIONS:  -  Assess patient's ability to carry out ADLs; assess patient's baseline for ADL function and identify physical deficits which impact ability to perform ADLs (bathing, care of mouth/teeth, toileting, grooming, dressing, etc )  - Assess/evaluate cause of self-care deficits   - Assess range of motion  - Assess patient's mobility; develop plan if impaired  - Assess patient's need for assistive devices and provide as appropriate  - Encourage maximum independence but intervene and supervise when necessary  - Involve family in performance of ADLs  - Assess for home care needs following discharge   - Consider OT consult to assist with ADL evaluation and planning for discharge  - Provide patient education as appropriate  Outcome: Progressing  Goal: Maintains/Returns to pre admission functional level  Description: INTERVENTIONS:  - Perform BMAT or MOVE assessment daily    - Set and communicate daily mobility goal to care team and patient/family/caregiver  - Collaborate with rehabilitation services on mobility goals if consulted  - Perform Range of Motion 3 times a day  - Reposition patient every 2 hours    - Dangle patient 3 times a day  - Stand patient 3 times a day  - Ambulate patient 3 times a day  - Out of bed to chair 3 times a day   - Out of bed for meals 3 times a day  - Out of bed for toileting  - Record patient progress and toleration of activity level   Outcome: Progressing     Problem: PAIN - ADULT  Goal: Verbalizes/displays adequate comfort level or baseline comfort level  Description: Interventions:  - Encourage patient to monitor pain and request assistance  - Assess pain using appropriate pain scale  - Administer analgesics based on type and severity of pain and evaluate response  - Implement non-pharmacological measures as appropriate and evaluate response  - Consider cultural and social influences on pain and pain management  - Notify physician/advanced practitioner if interventions unsuccessful or patient reports new pain  Outcome: Progressing     Problem: INFECTION - ADULT  Goal: Absence or prevention of progression during hospitalization  Description: INTERVENTIONS:  - Assess and monitor for signs and symptoms of infection  - Monitor lab/diagnostic results  - Monitor all insertion sites, i e  indwelling lines, tubes, and drains  - Monitor endotracheal if appropriate and nasal secretions for changes in amount and color  - [place] appropriate cooling/warming therapies per order  - Administer medications as ordered  - Instruct and encourage patient and family to use good hand hygiene technique  - Identify and instruct in appropriate isolation precautions for identified infection/condition  Outcome: Progressing     Problem: SAFETY ADULT  Goal: Maintain or return to baseline ADL function  Description: INTERVENTIONS:  -  Assess patient's ability to carry out ADLs; assess patient's baseline for ADL function and identify physical deficits which impact ability to perform ADLs (bathing, care of mouth/teeth, toileting, grooming, dressing, etc )  - Assess/evaluate cause of self-care deficits   - Assess range of motion  - Assess patient's mobility; develop plan if impaired  - Assess patient's need for assistive devices and provide as appropriate  - Encourage maximum independence but intervene and supervise when necessary  - Involve family in performance of ADLs  - Assess for home care needs following discharge   - Consider OT consult to assist with ADL evaluation and planning for discharge  - Provide patient education as appropriate  Outcome: Progressing  Goal: Maintains/Returns to pre admission functional level  Description: INTERVENTIONS:  - Perform BMAT or MOVE assessment daily    - Set and communicate daily mobility goal to care team and patient/family/caregiver     - Collaborate with rehabilitation services on mobility goals if consulted  - Perform Range of Motion 3 times a day  - Reposition patient every 2 hours    - Dangle patient 3 times a day  - Stand patient 3 times a day  - Ambulate patient 3 times a day  - Out of bed to chair 3 times a day   - Out of bed for meals 3 times a day  - Out of bed for toileting  - Record patient progress and toleration of activity level   Outcome: Progressing  Goal: Patient will remain free of falls  Description: INTERVENTIONS:  - Educate patient/family on patient safety including physical limitations  - Instruct patient to call for assistance with activity   - Consult OT/PT to assist with strengthening/mobility   - Keep Call bell within reach  - Keep bed low and locked with side rails adjusted as appropriate  - Keep care items and personal belongings within reach  - Initiate and maintain comfort rounds  - Make Fall Risk Sign visible to staff  - Offer Toileting every 2 Hours, in advance of need  - Initiate/Maintain bed alarm  - Obtain necessary fall risk management equipment  - Apply yellow socks and bracelet for high fall risk patients  - Consider moving patient to room near nurses station  Outcome: Progressing     Problem: DISCHARGE PLANNING  Goal: Discharge to home or other facility with appropriate resources  Description: INTERVENTIONS:  - Identify barriers to discharge w/patient and caregiver  - Arrange for needed discharge resources and transportation as appropriate  - Identify discharge learning needs (meds, wound care, etc )  - Arrange for interpretive services to assist at discharge as needed  - Refer to Case Management Department for coordinating discharge planning if the patient needs post-hospital services based on physician/advanced practitioner order or complex needs related to functional status, cognitive ability, or social support system  Outcome: Progressing     Problem: Knowledge Deficit  Goal: Patient/family/caregiver demonstrates understanding of disease process, treatment plan, medications, and discharge instructions  Description: Complete learning assessment and assess knowledge base  Interventions:  - Provide teaching at level of understanding  - Provide teaching via preferred learning methods  Outcome: Progressing     Problem: Nutrition/Hydration-ADULT  Goal: Nutrient/Hydration intake appropriate for improving, restoring or maintaining nutritional needs  Description: Monitor and assess patient's nutrition/hydration status for malnutrition  Collaborate with interdisciplinary team and initiate plan and interventions as ordered  Monitor patient's weight and dietary intake as ordered or per policy  Utilize nutrition screening tool and intervene as necessary  Determine patient's food preferences and provide high-protein, high-caloric foods as appropriate       INTERVENTIONS:  - Monitor oral intake, urinary output, labs, and treatment plans  - Assess nutrition and hydration status and recommend course of action  - Evaluate amount of meals eaten  - Assist patient with eating if necessary   - Allow adequate time for meals  - Recommend/ encourage appropriate diets, oral nutritional supplements, and vitamin/mineral supplements  - Order, calculate, and assess calorie counts as needed  - Recommend, monitor, and adjust tube feedings and TPN/PPN based on assessed needs  - Assess need for intravenous fluids  - Provide specific nutrition/hydration education as appropriate  - Include patient/family/caregiver in decisions related to nutrition  Outcome: Progressing     Problem: Prexisting or High Potential for Compromised Skin Integrity  Goal: Skin integrity is maintained or improved  Description: INTERVENTIONS:  - Identify patients at risk for skin breakdown  - Assess and monitor skin integrity  - Assess and monitor nutrition and hydration status  - Monitor labs   - Assess for incontinence   - Turn and reposition patient  - Assist with mobility/ambulation  - Relieve pressure over bony prominences  - Avoid friction and shearing  - Provide appropriate hygiene as needed including keeping skin clean and dry  - Evaluate need for skin moisturizer/barrier cream  - Collaborate with interdisciplinary team   - Patient/family teaching  - Consider wound care consult   Outcome: Progressing     Problem: Potential for Falls  Goal: Patient will remain free of falls  Description: INTERVENTIONS:  - Educate patient/family on patient safety including physical limitations  - Instruct patient to call for assistance with activity   - Consult OT/PT to assist with strengthening/mobility   - Keep Call bell within reach  - Keep bed low and locked with side rails adjusted as appropriate  - Keep care items and personal belongings within reach  - Initiate and maintain comfort rounds  - Make Fall Risk Sign visible to staff  - Offer Toileting every 2 Hours, in advance of need  - Initiate/Maintain bed alarm  - Obtain necessary fall risk management equipment  - Apply yellow socks and bracelet for high fall risk patients  - Consider moving patient to room near nurses station  Outcome: Progressing     Problem: NEUROSENSORY - ADULT  Goal: Achieves stable or improved neurological status  Description: INTERVENTIONS  - Monitor and report changes in neurological status  - Monitor vital signs such as temperature, blood pressure, glucose, and any other labs ordered   - Initiate measures to prevent increased intracranial pressure  - Monitor for seizure activity and implement precautions if appropriate      Outcome: Progressing     Problem: CARDIOVASCULAR - ADULT  Goal: Maintains optimal cardiac output and hemodynamic stability  Description: INTERVENTIONS:  - Monitor I/O, vital signs and rhythm  - Monitor for S/S and trends of decreased cardiac output  - Administer and titrate ordered vasoactive medications to optimize hemodynamic stability  - Assess quality of pulses, skin color and temperature  - Assess for signs of decreased coronary artery perfusion  - Instruct patient to report change in severity of symptoms  Outcome: Progressing  Goal: Absence of cardiac dysrhythmias or at baseline rhythm  Description: INTERVENTIONS:  - Continuous cardiac monitoring, vital signs, obtain 12 lead EKG if ordered  - Administer antiarrhythmic and heart rate control medications as ordered  - Monitor electrolytes and administer replacement therapy as ordered  Outcome: Progressing     Problem: RESPIRATORY - ADULT  Goal: Achieves optimal ventilation and oxygenation  Description: INTERVENTIONS:  - Assess for changes in respiratory status  - Assess for changes in mentation and behavior  - Position to facilitate oxygenation and minimize respiratory effort  - Oxygen administered by appropriate delivery if ordered  - Initiate smoking cessation education as indicated  - Encourage broncho-pulmonary hygiene including cough, deep breathe, Incentive Spirometry  - Assess the need for suctioning and aspirate as needed  - Assess and instruct to report SOB or any respiratory difficulty  - Respiratory Therapy support as indicated  Outcome: Progressing     Problem: GASTROINTESTINAL - ADULT  Goal: Minimal or absence of nausea and/or vomiting  Description: INTERVENTIONS:  - Administer IV fluids if ordered to ensure adequate hydration  - Maintain NPO status until nausea and vomiting are resolved  - Nasogastric tube if ordered  - Administer ordered antiemetic medications as needed  - Provide nonpharmacologic comfort measures as appropriate  - Advance diet as tolerated, if ordered  - Consider nutrition services referral to assist patient with adequate nutrition and appropriate food choices  Outcome: Progressing  Goal: Maintains or returns to baseline bowel function  Description: INTERVENTIONS:  - Assess bowel function  - Encourage oral fluids to ensure adequate hydration  - Administer IV fluids if ordered to ensure adequate hydration  - Administer ordered medications as needed  - Encourage mobilization and activity  - Consider nutritional services referral to assist patient with adequate nutrition and appropriate food choices  Outcome: Progressing  Goal: Maintains adequate nutritional intake  Description: INTERVENTIONS:  - Monitor percentage of each meal consumed  - Identify factors contributing to decreased intake, treat as appropriate  - Assist with meals as needed  - Monitor I&O, weight, and lab values if indicated  - Obtain nutrition services referral as needed  Outcome: Progressing     Problem: GENITOURINARY - ADULT  Goal: Maintains or returns to baseline urinary function  Description: INTERVENTIONS:  - Assess urinary function  - Encourage oral fluids to ensure adequate hydration if ordered  - Administer IV fluids as ordered to ensure adequate hydration  - Administer ordered medications as needed  - Offer frequent toileting  - Follow urinary retention protocol if ordered  Outcome: Progressing     Problem: METABOLIC, FLUID AND ELECTROLYTES - ADULT  Goal: Electrolytes maintained within normal limits  Description: INTERVENTIONS:  - Monitor labs and assess patient for signs and symptoms of electrolyte imbalances  - Administer electrolyte replacement as ordered  - Monitor response to electrolyte replacements, including repeat lab results as appropriate  - Instruct patient on fluid and nutrition as appropriate  Outcome: Progressing  Goal: Fluid balance maintained  Description: INTERVENTIONS:  - Monitor labs   - Monitor I/O and WT  - Instruct patient on fluid and nutrition as appropriate  - Assess for signs & symptoms of volume excess or deficit  Outcome: Progressing     Problem: SKIN/TISSUE INTEGRITY - ADULT  Goal: Skin Integrity remains intact(Skin Breakdown Prevention)  Description: Assess:  -Perform Nate assessment every shift  -Clean and moisturize skin every day  -Inspect skin when repositioning, toileting, and assisting with ADLS  -Assess under medical devices such as masimo every shift  -Assess extremities for adequate circulation and sensation     Bed Management:  -Have minimal linens on bed & keep smooth, unwrinkled  -Change linens as needed when moist or perspiring  -Avoid sitting or lying in one position for more than 2 hours while in bed  -Keep HOB at 30 degrees     Toileting:  -Offer bedside commode  -Assess for incontinence every 4 hrs  -Use incontinent care products after each incontinent episode such as foam cleanser    Activity:  -Mobilize patient 3 times a day  -Encourage activity and walks on unit  -Encourage or provide ROM exercises   -Turn and reposition patient every 2 Hours  -Use appropriate equipment to lift or move patient in bed  -Instruct/ Assist with weight shifting every 1 hr when out of bed in chair  -Consider limitation of chair time 2 hour intervals    Skin Care:  -Avoid use of baby powder, tape, friction and shearing, hot water or constrictive clothing  -Relieve pressure over bony prominences using pillows  -Do not massage red bony areas    Outcome: Progressing  Goal: Incision(s), wounds(s) or drain site(s) healing without S/S of infection  Description: INTERVENTIONS  - Assess and document dressing, incision, wound bed, drain sites and surrounding tissue  - Provide patient and family education  - Perform skin care/dressing changes every day  Outcome: Progressing     Problem: HEMATOLOGIC - ADULT  Goal: Maintains hematologic stability  Description: INTERVENTIONS  - Assess for signs and symptoms of bleeding or hemorrhage  - Monitor labs  - Administer supportive blood products/factors as ordered and appropriate  Outcome: Progressing     Problem: MUSCULOSKELETAL - ADULT  Goal: Maintain or return mobility to safest level of function  Description: INTERVENTIONS:  - Assess patient's ability to carry out ADLs; assess patient's baseline for ADL function and identify physical deficits which impact ability to perform ADLs (bathing, care of mouth/teeth, toileting, grooming, dressing, etc )  - Assess/evaluate cause of self-care deficits   - Assess range of motion  - Assess patient's mobility  - Assess patient's need for assistive devices and provide as appropriate  - Encourage maximum independence but intervene and supervise when necessary  - Involve family in performance of ADLs  - Assess for home care needs following discharge   - Consider OT consult to assist with ADL evaluation and planning for discharge  - Provide patient education as appropriate  Outcome: Progressing     Problem: COPING  Goal: Pt/Family able to verbalize concerns and demonstrate effective coping strategies  Description: INTERVENTIONS:  - Assist patient/family to identify coping skills, available support systems and cultural and spiritual values  - Provide emotional support, including active listening and acknowledgement of concerns of patient and caregivers  - Reduce environmental stimuli, as able  - Provide patient education  - Assess for spiritual pain/suffering and initiate spiritual care, including notification of Pastoral Care or [place] based community as needed  - Assess effectiveness of coping strategies  Outcome: Progressing  Goal: Will report anxiety at manageable levels  Description: INTERVENTIONS:  - Administer medication as ordered  - Teach and encourage coping skills  - Provide emotional support  - Assess patient/family for anxiety and ability to cope  Outcome: Progressing

## 2022-08-24 NOTE — PROGRESS NOTES
[facility]  Progress Note Bryn Araujo 1960, 57 y o  female MRN: [id number]  [facility] Encounter: [id number]  Primary Care Provider: No primary care provider on file  Date and time admitted to hospital: 8/21/2022  9:49 AM    MGUS (monoclonal gammopathy of unknown significance)  Assessment & Plan  · Per Epic review, MGUS w/ h/o recurrent DVTs on Eliquis/ASA      Pulmonary emphysema (HCC)  Assessment & Plan  · Arrived to B on 3L supplemental O2 sating 100%  · PRN bronchodilators  · Q1hr I/S use  · 1PPD smoker  · Nicotine patch  · Goal SpO2 >88%  · Pulmonology consult for COPD medication titration/reconciliation    DVT (deep venous thrombosis) (HCC)  Assessment & Plan  · Continue home eliquis (resumed 8/22/22)    Fall  Assessment & Plan  · 8/21 mechanical fall d/t ambulatory dysfunction and Parkinson's disease  · No home assistive devices for ambulation  · PT OT consulted      Scalp laceration, initial encounter  Assessment & Plan  · 11cm scalp laceration after fall  · S/p staple repair at OSH 8/21  · Staple removal 10-14 days  · Outpatient follow up trauma clinic    Unspecified injury of left vertebral artery, initial encounter  Assessment & Plan  8/21 CTA neck: No vascular enhancement of left vertebral artery in the neck  However, in this patient with no acute/persistent neurologic symptoms, the appearance is most suggestive of anatomic variation rather than acute traumatic left vertebral artery injury, specifically as there is no enhancement even of left vertebral artery origin  There is enhancement of left vertebral artery and left posterior cerebellar artery above the level of foramen magnum presumably via collateral circulation across vertebral basilar junction  There are patent posterior communicating arteries bilaterally and the basilar artery is somewhat diminutive consistent with anatomic variation       Neurosurgery consulted  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      Nasal fracture  Assessment & Plan  · 8/21 CT facial bones: nasal bone fracture  · Unasyn for PPX  · S/p operative fixation by OMFS on 8/22/22  · Sinus precautions    C3 cervical fracture (HCC)  Assessment & Plan  As per C2 cervical fracture plan      Parkinson disease ([address] )  Assessment & Plan  · Takes carbidopa-levodopa-entacapone (STALEVO) -200 MG per tablet  · paxil  · Propranolol for tremor    * C2 cervical fracture (HCC)  Assessment & Plan  · 8/21 CT cspine: anterioinferior C2 fx, L transverse process fx C3, old L clavicle fx  · Neurosurgery consulted  · Appreciate recommendations  · Cervical spine precautions  · Non-op in cervical collar, upright imaging          Disposition: transfer to med/surg level of care, likely stable for discharge in next 24 hours    SUBJECTIVE:    Subjective: No acute events overnight  Has remained on face tent for supplemental oxygen despite adequate saturations  Face tent removed this morning  OBJECTIVE:   Vitals:   Temp:  [96 8 °F (36 °C)-98 5 °F (36 9 °C)] 98 4 °F (36 9 °C)  HR:  [66-76] 76  Resp:  [16-18] 16  BP: (104-130)/(52-87) 114/55  FiO2 (%):  [28-35] 28    Intake/Output:  I/O       08/22 0701  08/23 0700 08/23 0701  08/24 0700 08/24 0701  08/25 0700    P  O  270 125 50    I V  (mL/kg) 400 (8 4)      Blood       IV Piggyback  300 100    Total Intake(mL/kg) 670 (14) 425 (9 2) 150 (3 3)    Urine (mL/kg/hr) 600 (0 5)      Total Output 600      Net +70 +425 +150           Unmeasured Urine Occurrence 3 x 5 x     Unmeasured Stool Occurrence  4 x          Nutrition: Diet Dysphagia/Modified Consistency; Dysphagia 3-Dental Soft;  Thin Liquid  VTE Prophylaxis: eliquis     Physical Exam:   Gen:  NAD  HENT: MMM, cervical collar in place  CV:  RRR  Lungs:  nl effort, SpO2 low-mid 90s without face tent  Abd:  soft, NT/ND  Ext:  no CCE  Skin:  no rashes  Neuro:  A&Ox3     Invasive Devices  Report    Peripheral Intravenous Line  Duration           Peripheral IV 08/21/22 Left;Proximal;Ventral (anterior) Forearm 3 days    Peripheral IV 08/22/22 Right;Upper;Ventral (anterior) Arm 2 days                      Lab Results:   Results: I have personally reviewed all pertinent laboratory/tests results, BMP/CMP:   Lab Results   Component Value Date    SODIUM 138 08/24/2022    K 3 8 08/24/2022     08/24/2022    CO2 31 08/24/2022    BUN 10 08/24/2022    CREATININE 0 72 08/24/2022    CALCIUM 8 6 08/24/2022    AST 48 (H) 08/24/2022    ALT 16 08/24/2022    ALKPHOS 162 (H) 08/24/2022    EGFR 90 08/24/2022    and CBC:   Lab Results   Component Value Date    WBC 5 64 08/24/2022    HGB 9 7 (L) 08/24/2022    HCT 31 6 (L) 08/24/2022    MCV 93 08/24/2022     08/24/2022    MCH 28 6 08/24/2022    MCHC 30 7 (L) 08/24/2022    RDW 18 6 (H) 08/24/2022    MPV 12 0 08/24/2022     Imaging/EKG Studies: I have personally reviewed pertinent reports  Other Studies: I have personally reviewed pertinent reports

## 2022-08-24 NOTE — PHYSICAL THERAPY NOTE
Physical Therapy Progress Note     08/24/22 0945   PT Last Visit   PT Visit Date 08/24/22   Note Type   Note Type Treatment   Pain Assessment   Pain Score No Pain   Restrictions/Precautions   Braces or Orthoses C/S Collar   Other Precautions O2;Fall Risk;Pain;Spinal precautions   Subjective   Subjective Pt encountered supine in bed with  present  Reports no new complaints today  Reports she is normally independent with mobiilty, but does require assist for various ADLs due to tremors  Reports no change in status with activity today  Bed Mobility   Supine to Sit 4  Minimal assistance   Additional items Assist x 1;HOB elevated; Increased time required; Bedrails   Transfers   Sit to Stand 4  Minimal assistance   Additional items Assist x 1; Armrests; Increased time required   Stand to Sit 4  Minimal assistance   Additional items Assist x 1; Armrests; Increased time required   Ambulation/Elevation   Gait pattern Step to;Short stride; Foward flexed; Inconsistent carter; Shuffling;Decreased foot clearance;Narrow ROBERTO; Improper Weight shift   Gait Assistance 4  Minimal assist   Additional items Assist x 1   Assistive Device Rolling walker   Distance 3' bed to chair   Balance   Static Sitting Fair   Static Standing Poor +   Ambulatory Poor +   Activity Tolerance   Activity Tolerance Patient tolerated treatment well   Nurse Made Aware yes   Assessment   Prognosis Good   Problem List Decreased strength;Decreased endurance; Impaired balance;Decreased mobility   Assessment Pt demosntrated improved transfer ability today, performing all tasks with reduced assist & no LOB noted despite tremors throughout session  Pt safely managed RW to pivot to reclienr without LOB  further ambulation limited due to presence of supplemental O2 by way of tent mask & no adaptor to put pt on portable O2 during session  Unable to use nasal canulla due to recent nasal fracture & subsequent surgery    Attempted sitting on RA, but pt's SpO2 dropped form 94-5% to 87%  Anticipate pt will quickly progress ambulatory distances based on strength demonstrated today pending cardiopulmonary status in upcoming sessions  Will follow up with respiratory therapy to address O2 requirements prior to next session prn  POC & d/c plan remain appropraite at this time  Goals   Patient Goals to get better   STG Expiration Date 08/22/22   PT Treatment Day 1   Plan   Treatment/Interventions Functional transfer training;LE strengthening/ROM; Therapeutic exercise;Elevations; Endurance training;Patient/family training;Equipment eval/education; Bed mobility;Gait training   Progress Progressing toward goals   PT Frequency Other (Comment)  (3-6x/week)   Recommendation   PT Discharge Recommendation Post acute rehabilitation services   Equipment Recommended [facility] Recommended Wheeled walker   AM-PAC Basic Mobility Inpatient   Turning in Bed Without Bedrails 3   Lying on Back to Sitting on Edge of Flat Bed 3   Moving Bed to Chair 3   Standing Up From Chair 3   Walk in Room 3   Climb 3-5 Stairs 2   Basic Mobility Inpatient Raw Score 17   Basic Mobility Standardized Score 39 67   Highest Level Of Mobility   JH-HLM Goal 5: Stand one or more mins   JH-HLM Achieved 4: Move to chair/commode     Nadeen Clements PTA    An [facility] Basic Mobility Standardized Score of 40 78 suggests pt would benefit from post acute rehab

## 2022-08-24 NOTE — PLAN OF CARE
Problem: MOBILITY - ADULT  Goal: Maintain or return to baseline ADL function  Description: INTERVENTIONS:  -  Assess patient's ability to carry out ADLs; assess patient's baseline for ADL function and identify physical deficits which impact ability to perform ADLs (bathing, care of mouth/teeth, toileting, grooming, dressing, etc )  - Assess/evaluate cause of self-care deficits   - Assess range of motion  - Assess patient's mobility; develop plan if impaired  - Assess patient's need for assistive devices and provide as appropriate  - Encourage maximum independence but intervene and supervise when necessary  - Involve family in performance of ADLs  - Assess for home care needs following discharge   - Consider OT consult to assist with ADL evaluation and planning for discharge  - Provide patient education as appropriate  Outcome: Progressing  Goal: Maintains/Returns to pre admission functional level  Description: INTERVENTIONS:  - Perform BMAT or MOVE assessment daily    - Set and communicate daily mobility goal to care team and patient/family/caregiver  - Collaborate with rehabilitation services on mobility goals if consulted  - Perform Range of Motion 3 times a day  - Reposition patient every 2 hours    - Dangle patient 3 times a day  - Stand patient 3 times a day  - Ambulate patient 3 times a day  - Out of bed to chair 3 times a day   - Out of bed for meals 3 times a day  - Out of bed for toileting  - Record patient progress and toleration of activity level   Outcome: Progressing     Problem: PAIN - ADULT  Goal: Verbalizes/displays adequate comfort level or baseline comfort level  Description: Interventions:  - Encourage patient to monitor pain and request assistance  - Assess pain using appropriate pain scale  - Administer analgesics based on type and severity of pain and evaluate response  - Implement non-pharmacological measures as appropriate and evaluate response  - Consider cultural and social influences on pain and pain management  - Notify physician/advanced practitioner if interventions unsuccessful or patient reports new pain  Outcome: Progressing     Problem: INFECTION - ADULT  Goal: Absence or prevention of progression during hospitalization  Description: INTERVENTIONS:  - Assess and monitor for signs and symptoms of infection  - Monitor lab/diagnostic results  - Monitor all insertion sites, i e  indwelling lines, tubes, and drains  - Monitor endotracheal if appropriate and nasal secretions for changes in amount and color  - [place] appropriate cooling/warming therapies per order  - Administer medications as ordered  - Instruct and encourage patient and family to use good hand hygiene technique  - Identify and instruct in appropriate isolation precautions for identified infection/condition  Outcome: Progressing     Problem: SAFETY ADULT  Goal: Maintain or return to baseline ADL function  Description: INTERVENTIONS:  -  Assess patient's ability to carry out ADLs; assess patient's baseline for ADL function and identify physical deficits which impact ability to perform ADLs (bathing, care of mouth/teeth, toileting, grooming, dressing, etc )  - Assess/evaluate cause of self-care deficits   - Assess range of motion  - Assess patient's mobility; develop plan if impaired  - Assess patient's need for assistive devices and provide as appropriate  - Encourage maximum independence but intervene and supervise when necessary  - Involve family in performance of ADLs  - Assess for home care needs following discharge   - Consider OT consult to assist with ADL evaluation and planning for discharge  - Provide patient education as appropriate  Outcome: Progressing  Goal: Maintains/Returns to pre admission functional level  Description: INTERVENTIONS:  - Perform BMAT or MOVE assessment daily    - Set and communicate daily mobility goal to care team and patient/family/caregiver     - Collaborate with rehabilitation services on mobility goals if consulted  - Perform Range of Motion 3 times a day  - Reposition patient every 2 hours    - Dangle patient 3 times a day  - Stand patient 3 times a day  - Ambulate patient 3 times a day  - Out of bed to chair 3 times a day   - Out of bed for meals 3 times a day  - Out of bed for toileting  - Record patient progress and toleration of activity level   Outcome: Progressing  Goal: Patient will remain free of falls  Description: INTERVENTIONS:  - Educate patient/family on patient safety including physical limitations  - Instruct patient to call for assistance with activity   - Consult OT/PT to assist with strengthening/mobility   - Keep Call bell within reach  - Keep bed low and locked with side rails adjusted as appropriate  - Keep care items and personal belongings within reach  - Initiate and maintain comfort rounds  - Make Fall Risk Sign visible to staff  - Offer Toileting every  Hours, in advance of need  - Initiate/Maintain alarm  - Obtain necessary fall risk management equipment  - Apply yellow socks and bracelet for high fall risk patients  - Consider moving patient to room near nurses station  Outcome: Progressing

## 2022-08-24 NOTE — ASSESSMENT & PLAN NOTE
· Arrived to [facility] on 3L supplemental O2 sating 100%  · PRN bronchodilators  · Q1hr I/S use  · 1PPD smoker  · Nicotine patch  · Goal SpO2 >88%  · Pulmonology consult for COPD medication titration/reconciliation

## 2022-08-25 LAB
FLUAV RNA RESP QL NAA+PROBE: NEGATIVE
FLUBV RNA RESP QL NAA+PROBE: NEGATIVE
RSV RNA RESP QL NAA+PROBE: NEGATIVE
SARS-COV-2 RNA RESP QL NAA+PROBE: POSITIVE

## 2022-08-25 PROCEDURE — 97535 SELF CARE MNGMENT TRAINING: CPT

## 2022-08-25 PROCEDURE — 99232 SBSQ HOSP IP/OBS MODERATE 35: CPT | Performed by: INTERNAL MEDICINE

## 2022-08-25 PROCEDURE — 0241U HB NFCT DS VIR RESP RNA 4 TRGT: CPT | Performed by: SURGERY

## 2022-08-25 PROCEDURE — 94668 MNPJ CHEST WALL SBSQ: CPT

## 2022-08-25 PROCEDURE — 99232 SBSQ HOSP IP/OBS MODERATE 35: CPT | Performed by: SURGERY

## 2022-08-25 PROCEDURE — 94760 N-INVAS EAR/PLS OXIMETRY 1: CPT

## 2022-08-25 PROCEDURE — 94640 AIRWAY INHALATION TREATMENT: CPT

## 2022-08-25 RX ORDER — BUDESONIDE 0.5 MG/2ML
0.5 INHALANT ORAL
Status: DISCONTINUED | OUTPATIENT
Start: 2022-08-25 | End: 2022-08-30 | Stop reason: HOSPADM

## 2022-08-25 RX ORDER — FORMOTEROL FUMARATE 20 UG/2ML
20 SOLUTION RESPIRATORY (INHALATION)
Status: DISCONTINUED | OUTPATIENT
Start: 2022-08-25 | End: 2022-08-30 | Stop reason: HOSPADM

## 2022-08-25 RX ADMIN — GABAPENTIN 300 MG: 300 CAPSULE ORAL at 08:23

## 2022-08-25 RX ADMIN — LIDOCAINE 5% 1 PATCH: 700 PATCH TOPICAL at 08:23

## 2022-08-25 RX ADMIN — PAROXETINE HYDROCHLORIDE 20 MG: 20 TABLET, FILM COATED ORAL at 08:23

## 2022-08-25 RX ADMIN — ENTACAPONE 200 MG: 200 TABLET, FILM COATED ORAL at 14:41

## 2022-08-25 RX ADMIN — APIXABAN 5 MG: 5 TABLET, FILM COATED ORAL at 17:38

## 2022-08-25 RX ADMIN — CARBIDOPA AND LEVODOPA 2 TABLET: 25; 100 TABLET ORAL at 14:41

## 2022-08-25 RX ADMIN — AMPICILLIN SODIUM AND SULBACTAM SODIUM 3 G: 2; 1 INJECTION, POWDER, FOR SOLUTION INTRAMUSCULAR; INTRAVENOUS at 21:40

## 2022-08-25 RX ADMIN — PROPRANOLOL HYDROCHLORIDE 160 MG: 80 CAPSULE, EXTENDED RELEASE ORAL at 08:23

## 2022-08-25 RX ADMIN — AMPICILLIN SODIUM AND SULBACTAM SODIUM 3 G: 2; 1 INJECTION, POWDER, FOR SOLUTION INTRAMUSCULAR; INTRAVENOUS at 11:09

## 2022-08-25 RX ADMIN — AMPICILLIN SODIUM AND SULBACTAM SODIUM 3 G: 2; 1 INJECTION, POWDER, FOR SOLUTION INTRAMUSCULAR; INTRAVENOUS at 17:36

## 2022-08-25 RX ADMIN — CARBIDOPA AND LEVODOPA 2 TABLET: 25; 100 TABLET ORAL at 08:23

## 2022-08-25 RX ADMIN — CARBIDOPA AND LEVODOPA 2 TABLET: 25; 100 TABLET ORAL at 21:13

## 2022-08-25 RX ADMIN — ENTACAPONE 200 MG: 200 TABLET, FILM COATED ORAL at 17:39

## 2022-08-25 RX ADMIN — ACETAMINOPHEN 975 MG: 325 TABLET ORAL at 21:12

## 2022-08-25 RX ADMIN — AMPICILLIN SODIUM AND SULBACTAM SODIUM 3 G: 2; 1 INJECTION, POWDER, FOR SOLUTION INTRAMUSCULAR; INTRAVENOUS at 05:41

## 2022-08-25 RX ADMIN — APIXABAN 5 MG: 5 TABLET, FILM COATED ORAL at 08:23

## 2022-08-25 RX ADMIN — BUDESONIDE 0.5 MG: 0.5 INHALANT ORAL at 19:42

## 2022-08-25 RX ADMIN — ACETAMINOPHEN 975 MG: 325 TABLET ORAL at 14:41

## 2022-08-25 RX ADMIN — NICOTINE 14 MG: 14 PATCH, EXTENDED RELEASE TRANSDERMAL at 08:23

## 2022-08-25 RX ADMIN — CARBIDOPA AND LEVODOPA 2 TABLET: 25; 100 TABLET ORAL at 17:38

## 2022-08-25 RX ADMIN — GABAPENTIN 300 MG: 300 CAPSULE ORAL at 21:12

## 2022-08-25 RX ADMIN — BUDESONIDE 0.5 MG: 0.5 INHALANT ORAL at 09:40

## 2022-08-25 RX ADMIN — ALBUTEROL SULFATE 2.5 MG: 2.5 SOLUTION RESPIRATORY (INHALATION) at 02:19

## 2022-08-25 RX ADMIN — FORMOTEROL FUMARATE DIHYDRATE 20 MCG: 20 SOLUTION RESPIRATORY (INHALATION) at 19:42

## 2022-08-25 RX ADMIN — ENTACAPONE 200 MG: 200 TABLET, FILM COATED ORAL at 08:23

## 2022-08-25 RX ADMIN — GABAPENTIN 300 MG: 300 CAPSULE ORAL at 17:38

## 2022-08-25 RX ADMIN — ALBUTEROL SULFATE 2.5 MG: 2.5 SOLUTION RESPIRATORY (INHALATION) at 09:40

## 2022-08-25 RX ADMIN — ASPIRIN 81 MG: 81 TABLET, COATED ORAL at 08:23

## 2022-08-25 RX ADMIN — ENTACAPONE 200 MG: 200 TABLET, FILM COATED ORAL at 21:15

## 2022-08-25 NOTE — PLAN OF CARE
Problem: MOBILITY - ADULT  Goal: Maintain or return to baseline ADL function  Description: INTERVENTIONS:  -  Assess patient's ability to carry out ADLs; assess patient's baseline for ADL function and identify physical deficits which impact ability to perform ADLs (bathing, care of mouth/teeth, toileting, grooming, dressing, etc )  - Assess/evaluate cause of self-care deficits   - Assess range of motion  - Assess patient's mobility; develop plan if impaired  - Assess patient's need for assistive devices and provide as appropriate  - Encourage maximum independence but intervene and supervise when necessary  - Involve family in performance of ADLs  - Assess for home care needs following discharge   - Consider OT consult to assist with ADL evaluation and planning for discharge  - Provide patient education as appropriate  Outcome: Progressing  Goal: Maintains/Returns to pre admission functional level  Description: INTERVENTIONS:  - Perform BMAT or MOVE assessment daily    - Set and communicate daily mobility goal to care team and patient/family/caregiver  - Collaborate with rehabilitation services on mobility goals if consulted  - Perform Range of Motion 3 times a day  - Reposition patient every 2 hours    - Dangle patient 3 times a day  - Stand patient 3 times a day  - Ambulate patient 3 times a day  - Out of bed to chair 3 times a day   - Out of bed for meals 3 times a day  - Out of bed for toileting  - Record patient progress and toleration of activity level   Outcome: Progressing     Problem: PAIN - ADULT  Goal: Verbalizes/displays adequate comfort level or baseline comfort level  Description: Interventions:  - Encourage patient to monitor pain and request assistance  - Assess pain using appropriate pain scale  - Administer analgesics based on type and severity of pain and evaluate response  - Implement non-pharmacological measures as appropriate and evaluate response  - Consider cultural and social influences on pain and pain management  - Notify physician/advanced practitioner if interventions unsuccessful or patient reports new pain  Outcome: Progressing     Problem: INFECTION - ADULT  Goal: Absence or prevention of progression during hospitalization  Description: INTERVENTIONS:  - Assess and monitor for signs and symptoms of infection  - Monitor lab/diagnostic results  - Monitor all insertion sites, i e  indwelling lines, tubes, and drains  - Monitor endotracheal if appropriate and nasal secretions for changes in amount and color  - [place] appropriate cooling/warming therapies per order  - Administer medications as ordered  - Instruct and encourage patient and family to use good hand hygiene technique  - Identify and instruct in appropriate isolation precautions for identified infection/condition  Outcome: Progressing     Problem: SAFETY ADULT  Goal: Maintain or return to baseline ADL function  Description: INTERVENTIONS:  -  Assess patient's ability to carry out ADLs; assess patient's baseline for ADL function and identify physical deficits which impact ability to perform ADLs (bathing, care of mouth/teeth, toileting, grooming, dressing, etc )  - Assess/evaluate cause of self-care deficits   - Assess range of motion  - Assess patient's mobility; develop plan if impaired  - Assess patient's need for assistive devices and provide as appropriate  - Encourage maximum independence but intervene and supervise when necessary  - Involve family in performance of ADLs  - Assess for home care needs following discharge   - Consider OT consult to assist with ADL evaluation and planning for discharge  - Provide patient education as appropriate  Outcome: Progressing  Goal: Maintains/Returns to pre admission functional level  Description: INTERVENTIONS:  - Perform BMAT or MOVE assessment daily    - Set and communicate daily mobility goal to care team and patient/family/caregiver     - Collaborate with rehabilitation services on mobility goals if consulted  - Perform Range of Motion 3 times a day  - Reposition patient every 2 hours  - Dangle patient 3 times a day  - Stand patient 3 times a day  - Ambulate patient 3 times a day  - Out of bed to chair 3 times a day   - Out of bed for meals 3 times a day  - Out of bed for toileting  - Record patient progress and toleration of activity level   Outcome: Progressing  Goal: Patient will remain free of falls  Description: INTERVENTIONS:  - Educate patient/family on patient safety including physical limitations  - Instruct patient to call for assistance with activity   - Consult OT/PT to assist with strengthening/mobility   - Keep Call bell within reach  - Keep bed low and locked with side rails adjusted as appropriate  - Keep care items and personal belongings within reach  - Initiate and maintain comfort rounds  - Make Fall Risk Sign visible to staff  - Offer Toileting every  Hours, in advance of need  - Initiate/Maintain alarm  - Obtain necessary fall risk management equipment  - Apply yellow socks and bracelet for high fall risk patients  - Consider moving patient to room near nurses station  Outcome: Progressing     Problem: Nutrition/Hydration-ADULT  Goal: Nutrient/Hydration intake appropriate for improving, restoring or maintaining nutritional needs  Description: Monitor and assess patient's nutrition/hydration status for malnutrition  Collaborate with interdisciplinary team and initiate plan and interventions as ordered  Monitor patient's weight and dietary intake as ordered or per policy  Utilize nutrition screening tool and intervene as necessary  Determine patient's food preferences and provide high-protein, high-caloric foods as appropriate       INTERVENTIONS:  - Monitor oral intake, urinary output, labs, and treatment plans  - Assess nutrition and hydration status and recommend course of action  - Evaluate amount of meals eaten  - Assist patient with eating if necessary   - Allow adequate time for meals  - Recommend/ encourage appropriate diets, oral nutritional supplements, and vitamin/mineral supplements  - Order, calculate, and assess calorie counts as needed  - Recommend, monitor, and adjust tube feedings and TPN/PPN based on assessed needs  - Assess need for intravenous fluids  - Provide specific nutrition/hydration education as appropriate  - Include patient/family/caregiver in decisions related to nutrition  Outcome: Progressing     Problem: Prexisting or High Potential for Compromised Skin Integrity  Goal: Skin integrity is maintained or improved  Description: INTERVENTIONS:  - Identify patients at risk for skin breakdown  - Assess and monitor skin integrity  - Assess and monitor nutrition and hydration status  - Monitor labs   - Assess for incontinence   - Turn and reposition patient  - Assist with mobility/ambulation  - Relieve pressure over bony prominences  - Avoid friction and shearing  - Provide appropriate hygiene as needed including keeping skin clean and dry  - Evaluate need for skin moisturizer/barrier cream  - Collaborate with interdisciplinary team   - Patient/family teaching  - Consider wound care consult   Outcome: Progressing

## 2022-08-25 NOTE — CASE MANAGEMENT
Case Management Discharge Planning Note    Patient name Reina Brunner  [facility] [id number]  : 1960 Date 2022       Current Admission Date: 2022  Current Admission Diagnosis:C2 cervical fracture [facility])   Patient Active Problem List    Diagnosis Date Noted    Acute blood loss anemia 2022    Parkinson disease ([facility] ) 2022    C2 cervical fracture ([facility] ) 2022    C3 cervical fracture ([facility] ) 2022    Nasal fracture 2022    Unspecified injury of left vertebral artery, initial encounter 2022    Scalp laceration, initial encounter 2022    Fall 2022    DVT (deep venous thrombosis) ([facility] ) 2022    Pulmonary emphysema (HCC)     MGUS (monoclonal gammopathy of unknown significance)       LOS (days): 4  Geometric Mean LOS (GMLOS) (days):   Days to GMLOS:     OBJECTIVE:  Risk of Unplanned Readmission Score: 11 52         Current admission status: Inpatient   Preferred Pharmacy:   [address] [place] [address]  Phone: [phone] Fax: [phone]    Primary Care Provider: No primary care provider on file  Primary Insurance: [employer]  Secondary Insurance:     DISCHARGE DETAILS:                                                                                                               Facility Insurance Auth Number: submitted SNF auth request to [facility] for [employer]  NPI: [id number]  Dr Shayy Levy  NPI: [id number]  Clinicals faxed to [phone]

## 2022-08-25 NOTE — OCCUPATIONAL THERAPY NOTE
Occupational Therapy Treatment Note:      08/25/22 1520   OT Last Visit   OT Visit Date 08/25/22   Note Type   Note Type Treatment   Restrictions/Precautions   Braces or Orthoses C/S Collar   Other Precautions Fall Risk   Pain Assessment   Pain Assessment Tool 0-10   Pain Score No Pain   ADL   Where Assessed Chair   Grooming Assistance 3  Moderate Assistance   Grooming Deficit   (long hair care / c /s collar)   UB Bathing Assistance 4  Minimal Assistance   LB Bathing Assistance 3  Moderate Assistance   LB Dressing Assistance 4  Minimal Assistance   Toileting Assistance  3  Moderate Assistance   Functional Standing Tolerance   Time f balance with rw during adls and functional mobility   Transfers   Sit to Stand 4  Minimal assistance   Additional items Assist x 1   Stand to Sit 4  Minimal assistance   Additional items Assist x 1   Functional Mobility   Functional Mobility 4  Minimal assistance   Additional Comments at times needed mod asst ie asst for balance and rw steering  [place] reports pt should use rw at home but doesnt always   Cognition   Arousal/Participation Cooperative   Attention Within functional limits   Memory Decreased recall of precautions   Following Commands Follows multistep commands with increased time or repetition   Assessment   Assessment pt participated in pm ot session and was seen focusing on adls  pts  was also present and participated in session  despite cues to avoid, pts  assists pt via b hand hold to stand from chair  pt walked with min to mod asst for balance and rw management  pts lora is  and at times is not home  pt states her grand daughter stays at her house on weekends   pts sister also lives with them in lower level of home  pt currently requires in pt rehab inorder to incrase safety, independence and manage c collar / sinus px's at this time   will follow focusing on goals from ie   Plan   Treatment Interventions ADL retraining;Functional transfer training; Endurance training;Patient/family training;Neuromuscular reeducation; Activityengagement   Goal Expiration Date 09/05/22   OT Treatment Day 1   OT Frequency 3-5x/wk   Recommendation   OT Discharge Recommendation Post acute rehabilitation services   AM-PAC Daily Activity Inpatient   Lower Body Dressing 3   Bathing 3   Toileting 2   Upper Body Dressing 2   Grooming 2   Eating 3   Daily Activity Raw Score 15   Daily Activity Standardized Score (Calc for Raw Score >=11) 34 69   AM-PAC Applied Cognition Inpatient   Following a Speech/Presentation 3   Understanding Ordinary Conversation 4   Taking Medications 3   Remembering Where Things Are Placed or Put Away 4   Remembering List of 4-5 Errands 4   Taking Care of Complicated Tasks 3   Applied Cognition Raw Score 21   Applied Cognition Standardized Score 44 3   April A ANAIS Bone

## 2022-08-25 NOTE — CASE MANAGEMENT
Case Management Discharge Planning Note    Patient name Alina Erwin  [facility] [id number]  : 1960 Date 2022       Current Admission Date: 2022  Current Admission Diagnosis:C2 cervical fracture [facility])   Patient Active Problem List    Diagnosis Date Noted    Acute blood loss anemia 2022    [facility]    C2 cervical fracture ([facility] ) 2022    C3 cervical fracture ([facility] ) 2022    Nasal fracture 2022    Unspecified injury of left vertebral artery, initial encounter 2022    Scalp laceration, initial encounter 2022    Fall 2022    DVT (deep venous thrombosis) ([facility] ) 2022    Pulmonary emphysema (HCC)     MGUS (monoclonal gammopathy of unknown significance)       LOS (days): 4  Geometric Mean LOS (GMLOS) (days):   Days to GMLOS:     OBJECTIVE:  Risk of Unplanned Readmission Score: 11 24         Current admission status: Inpatient   Preferred Pharmacy:   [facility] [address] [place] [address]  Phone: [phone] Fax: [phone]    Primary Care Provider: No primary care provider on file  Primary Insurance: Anuel Marquez  Secondary Insurance:     DISCHARGE DETAILS:    Pt's preference was for SNF location nearest to home  Closest location who has a bed and takes the pt's insurance was [facility] [employer] [facility]  CM reserved their slot and submitted for insurance approval  Plan to d/c there as soon as insurance gives determination

## 2022-08-25 NOTE — PLAN OF CARE
Problem: OCCUPATIONAL THERAPY ADULT  Goal: Performs self-care activities at highest level of function for planned discharge setting  See evaluation for individualized goals  Description: Treatment Interventions: ADL retraining, Functional transfer training, UE strengthening/ROM, Endurance training, Patient/family training, Equipment evaluation/education, Compensatory technique education, Continued evaluation, Energy conservation, Activityengagement          See flowsheet documentation for full assessment, interventions and recommendations  Outcome: Progressing  Note: Limitation: Decreased ADL status, Decreased Safe judgement during ADL, Decreased endurance, Decreased high-level ADLs, Decreased self-care trans  Prognosis: Fair  Assessment: pt participated in pm ot session and was seen focusing on adls  pts  was also present and participated in session  despite cues to avoid, pts  assists pt via b hand hold to stand from chair  pt walked with min to mod asst for balance and rw management  pts lora is  and at times is not home  pt states her grand daughter stays at her house on weekends   pts sister also lives with them in lower level of home  pt currently requires in pt rehab inorder to incrase safety, independence and manage c collar / sinus px's at this time   will follow focusing on goals from ie  Recommendation: (S) [address]  OT Discharge Recommendation: Post acute rehabilitation services     April ANAIS Barrera

## 2022-08-25 NOTE — PROGRESS NOTES
[facility]  Progress Note Virgie Dang 1960, 57 y o  female MRN: [id number]  Unit/Bed#: [facility] 627-01 Encounter: [id number]  Primary Care Provider: No primary care provider on file  Date and time admitted to hospital: 8/21/2022  9:49 AM    MGUS (monoclonal gammopathy of unknown significance)  Assessment & Plan  · Per Epic review, MGUS w/ h/o recurrent DVTs on Eliquis/ASA      Pulmonary emphysema (HCC)  Assessment & Plan  · Arrived to B on 3L supplemental O2 sating 100%  · PRN bronchodilators  · Q1hr I/S use  · 1PPD smoker  · Nicotine patch  · Goal SpO2 >88%  · Pulmonology consulted -- will need home oxygen evaluation and nebulizer machine for home  · This can be arranged for while at rehab    DVT (deep venous thrombosis) ([facility] [place] [address] )  Assessment & Plan  · Continue home eliquis (resumed 8/22/22)    Fall  Assessment & Plan  · 8/21 mechanical fall d/t ambulatory dysfunction and Parkinson's disease  · No home assistive devices for ambulation  · PT OT consulted      Scalp laceration, initial encounter  Assessment & Plan  · 11cm scalp laceration after fall  · S/p staple repair at OSH 8/21  · Staple removal 10-14 days  · Outpatient follow up trauma clinic    Unspecified injury of left vertebral artery, initial encounter  Assessment & Plan  8/21 CTA neck: No vascular enhancement of left vertebral artery in the neck  However, in this patient with no acute/persistent neurologic symptoms, the appearance is most suggestive of anatomic variation rather than acute traumatic left vertebral artery injury, specifically as there is no enhancement even of left vertebral artery origin  There is enhancement of left vertebral artery and left posterior cerebellar artery above the level of foramen magnum presumably via collateral circulation across vertebral basilar junction    There are patent posterior communicating arteries bilaterally and the basilar artery is somewhat diminutive consistent with anatomic variation  Neurosurgery consulted  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      Nasal fracture  Assessment & Plan  · 8/21 CT facial bones: nasal bone fracture  · Unasyn for PPX  · S/p operative fixation by OMFS on 8/22/22  · Sinus precautions    C3 cervical fracture (HCC)  Assessment & Plan  As per C2 cervical fracture plan      Parkinson disease ([facility] [address] )  Assessment & Plan  · Takes carbidopa-levodopa-entacapone (STALEVO) -200 MG per tablet  · paxil  · Propranolol for tremor    * C2 cervical fracture (HCC)  Assessment & Plan  · 8/21 CT cspine: anterioinferior C2 fx, L transverse process fx C3, old L clavicle fx  · Neurosurgery consulted  · Appreciate recommendations  · Cervical spine precautions  · Non-op in cervical collar, upright imaging          Disposition: pending placement    SUBJECTIVE:    Subjective: No acute events overnight  No new complaints  OBJECTIVE:   Vitals:   Temp:  [98 2 °F (36 8 °C)-99 3 °F (37 4 °C)] 98 2 °F (36 8 °C)  HR:  [73-78] 74  Resp:  [16-17] 17  BP: (111-146)/(74-85) 111/79    Intake/Output:  I/O       08/23 0701  08/24 0700 08/24 0701  08/25 0700 08/25 0701  08/26 0700    P  O  125 168     I V  (mL/kg)       IV Piggyback 300 200     Total Intake(mL/kg) 425 (9 2) 368 (8)     Urine (mL/kg/hr)       Total Output       Net +425 +368            Unmeasured Urine Occurrence 5 x 2 x 1 x    Unmeasured Stool Occurrence 4 x 1 x          Nutrition: Diet Dysphagia/Modified Consistency; Dysphagia 3-Dental Soft;  Thin Liquid  VTE Prophylaxis: eliquis     Physical Exam:   Gen:  NAD  HENT: MMM, c-collar in place  CV:  RRR  Lungs: nl effort on 2L NC  Abd:  soft, NT/ND  Ext:  no CCE  Skin:  no rashes  Neuro: A&Ox3     Invasive Devices  Report    Peripheral Intravenous Line  Duration           Peripheral IV 08/22/22 Right;Upper;Ventral (anterior) Arm 3 days Lab Results: Results: I have personally reviewed all pertinent laboratory/tests results, BMP/CMP: No results found for: SODIUM, K, CL, CO2, ANIONGAP, BUN, CREATININE, GLUCOSE, CALCIUM, AST, ALT, ALKPHOS, PROT, BILITOT, EGFR and CBC: No results found for: WBC, HGB, HCT, MCV, PLT, ADJUSTEDWBC, MCH, MCHC, RDW, MPV, NRBC  Imaging/EKG Studies: I have personally reviewed pertinent reports  Other Studies: I have personally reviewed pertinent reports

## 2022-08-25 NOTE — CASE MANAGEMENT
Case Management Discharge Planning Note    Patient name Yessi Busby  Location [facility] 627/PPHP 338-79 MRN [id number]  : 1960 Date 2022       Current Admission Date: 2022  Current Admission Diagnosis:C2 cervical fracture [facility])   Patient Active Problem List    Diagnosis Date Noted    Acute blood loss anemia 2022    Parkinson disease ([facility] [address] ) 2022    C2 cervical fracture ([facility] ) 2022    C3 cervical fracture ([facility] ) 2022    Nasal fracture 2022    Unspecified injury of left vertebral artery, initial encounter 2022    Scalp laceration, initial encounter 2022    Fall 2022    DVT (deep venous thrombosis) ([facility] ) 2022    Pulmonary emphysema (HCC)     MGUS (monoclonal gammopathy of unknown significance)       LOS (days): 4  Geometric Mean LOS (GMLOS) (days):   Days to GMLOS:     OBJECTIVE:  Risk of Unplanned Readmission Score: 11 52         Current admission status: Inpatient   Preferred Pharmacy:   [facility] [address] [place] [address]  Phone: [phone] Fax: [phone]    Primary Care Provider: No primary care provider on file  Primary Insurance: Vilinda Bernheim  Secondary Insurance:     DISCHARGE DETAILS:                                                                                                               [address] [place] [address] Number: approved [facility] #[id number] for [facility]:   NRD: 2 business days   F: [phone]

## 2022-08-25 NOTE — CASE MANAGEMENT
Case Management Discharge Planning Note    Patient name Seema Ambriz  Location [facility] 627/PPHP 956-05 MRN [id number]  : 1960 Date 2022       Current Admission Date: 2022  Current Admission Diagnosis:C2 cervical fracture [facility])   Patient Active Problem List    Diagnosis Date Noted    Acute blood loss anemia 2022    Parkinson disease ([facility] [address] ) 2022    C2 cervical fracture ([facility] ) 2022    C3 cervical fracture ([facility] ) 2022    Nasal fracture 2022    Unspecified injury of left vertebral artery, initial encounter 2022    Scalp laceration, initial encounter 2022    Fall 2022    DVT (deep venous thrombosis) ([facility] ) 2022    Pulmonary emphysema (HCC)     MGUS (monoclonal gammopathy of unknown significance)       LOS (days): 4  Geometric Mean LOS (GMLOS) (days):   Days to GMLOS:     OBJECTIVE:  Risk of Unplanned Readmission Score: 11 68         Current admission status: Inpatient   Preferred Pharmacy:   [facility] [address] [place] [address]  Phone: [phone] Fax: [phone]    Primary Care Provider: No primary care provider on file  Primary Insurance: Berneda Elieser  Secondary Insurance:     DISCHARGE DETAILS:       Other Referral/Resources/Interventions Provided:  Referral Comments: Admission to [place] [address]  on hold for 10 days due to testing positive for Covid    Transport was cancelled with [facility]

## 2022-08-25 NOTE — ASSESSMENT & PLAN NOTE
· Arrived to B on 3L supplemental O2 sating 100%  · PRN bronchodilators  · Q1hr I/S use  · 1PPD smoker  · Nicotine patch  · Goal SpO2 >88%  · Pulmonology consulted -- will need home oxygen evaluation and nebulizer machine for home  · This can be arranged for while at rehab

## 2022-08-25 NOTE — PROGRESS NOTES
PULMONOLOGY PROGRESS NOTE     Name: Nahid Palm   Age & Sex: 57 y o  female   MRN: [id number]  Unit/Bed#: [address] 627-01   Encounter: [id number]    PATIENT INFORMATION     Name: Nahid aPlm   Age & Sex: 57 y o  female   MRN: [id number]  Hospital Stay Days: 4    ASSESSMENT/PLAN     Assessment:   1  Acute on chronic respiratory failure with hypoxia  2  Pulmonary emphysema  3  Extensive smoking history with ongoing smoking  4  S/P fall with cervical spine and nasal fracture  5  Parkinson's Disease     Plan:   Not on home O2  Currently on 2L NC with SpO2 92%   Continue to wean supplemental O2 as tolerated to maintain SpO2 > 88%   Home O2 eval prior to discharge   Due to history of Parkinson's disease, patient will not be able to use inhalers   Discharge Recommendations: Nebulizer machine, budesonide 0 5 mg BID, Perforomist 20 mcg BID  Ipratropium/albuterol nebulizer 4 times daily as needed   Home O2 eval prior to discharge   PFTs ordered as outpatient   Will schedule follow up appointment with Pulmonology at [facility]  SUBJECTIVE     Patient seen and examined  No acute events overnight  Patient denies any worsening shortness of breath, chest pain  She has no other complaints  OBJECTIVE     Vitals:    22 1928 22 2239 22 0219 22 0803   BP:  138/74  124/85   BP Location:       Pulse:  74  73   Resp:    16   Temp:  98 6 °F (37 °C)  99 3 °F (37 4 °C)   TempSrc:       SpO2: 90% (!) 87% (!) 89% 92%   Weight:       Height:          Temperature:   Temp (24hrs), Av 8 °F (37 1 °C), Min:98 4 °F (36 9 °C), Max:99 3 °F (37 4 °C)    Temperature: 99 3 °F (37 4 °C)  Intake & Output:  I/O        07 07 07 07 07    P  O  125 168     I V  (mL/kg)       IV Piggyback 300 200     Total Intake(mL/kg) 425 (9 2) 368 (8)     Urine (mL/kg/hr)       Total Output       Net +425 +368            Unmeasured Urine Occurrence 5 x 2 x     Unmeasured Stool Occurrence 4 x 1 x         Weights:        Body mass index is 19 18 kg/m²  Weight (last 2 days)     Date/Time Weight    08/24/22 0600 46 (101 5)    08/23/22 0536 47 8 (105 38)        Physical Exam  Vitals and nursing note reviewed  Constitutional:       General: She is not in acute distress  Appearance: She is ill-appearing  Interventions: Nasal cannula in place  HENT:      Head: Normocephalic  Eyes:      Pupils: Pupils are equal, round, and reactive to light  Neck:      Comments: Cervical collar in place  Cardiovascular:      Rate and Rhythm: Normal rate and regular rhythm  Heart sounds: No murmur heard  Pulmonary:      Effort: No respiratory distress  Breath sounds: No wheezing, rhonchi or rales  Abdominal:      General: Bowel sounds are normal  There is no distension  Palpations: Abdomen is soft  Tenderness: There is no abdominal tenderness  There is no guarding  Musculoskeletal:      Right lower leg: No edema  Left lower leg: No edema  Skin:     General: Skin is warm  Capillary Refill: Capillary refill takes less than 2 seconds  Neurological:      Mental Status: She is alert and oriented to person, place, and time  Psychiatric:         Mood and Affect: Mood normal            LABORATORY DATA     Labs: I have personally reviewed pertinent reports  Results from last 7 days   Lab Units 08/24/22  0451 08/23/22  0525 08/22/22  0541 08/21/22  1452 08/21/22  0316   WBC Thousand/uL 5 64 5 80 5 42  --  9 36   HEMOGLOBIN g/dL 9 7* 9 4* 9 8*  9 7*   < > 7 7*   HEMATOCRIT % 31 6* 30 6* 31 9*  31 5*   < > 25 5*   PLATELETS Thousands/uL 172 152 135*  --  169   NEUTROS PCT %  --  51 36*  --  65   MONOS PCT %  --  10 11  --  10    < > = values in this interval not displayed        Results from last 7 days   Lab Units 08/24/22  0451 08/23/22  0525 08/22/22  0541 08/21/22  0316   POTASSIUM mmol/L 3 8 4 1 4 5 4 4   CHLORIDE mmol/L 104 103 103 105   CO2 mmol/L 31 30 31 29   BUN mg/dL 10 10 13 20   CREATININE mg/dL 0 72 0 70 0 78 0 84   CALCIUM mg/dL 8 6 8 3 8 5 7 4*   ALK PHOS U/L 162*  --  135* 108   ALT U/L 16  --  14 15   AST U/L 48*  --  43 51*     Results from last 7 days   Lab Units 08/22/22  0541 08/21/22  1452   MAGNESIUM mg/dL 2 6 1 7     Results from last 7 days   Lab Units 08/22/22  0541 08/21/22  1452   PHOSPHORUS mg/dL 3 6 4 2*      Results from last 7 days   Lab Units 08/21/22  0316   INR  1 36*   PTT seconds 33                 ABG:       Micro:         IMAGING & DIAGNOSTIC TESTING     Radiology Results: I have personally reviewed pertinent reports  XR chest portable    Result Date: 8/21/2022  Impression: Emphysematous changes are noted  No focal consolidation, pleural effusion, or pneumothorax  Workstation performed: [id number]     XR spine cervical 2 or 3 vw injury    Result Date: 8/22/2022  Impression: No change C2 reported fracture  C3 left transverse process fracture not appreciated radiographically  Workstation performed: [id number]     CT head without contrast    Result Date: 8/21/2022  Impression: Large posterior vertex scalp hematoma/laceration, no calvarial fracture or acute intracranial abnormality is seen  Other findings as above  CT CERVICAL SPINE - WITHOUT CONTRAST INDICATION:   Head trauma, moderate-severe Fall, head injury, face injury, on Eliquis  COMPARISON:  None  TECHNIQUE:  CT examination of the cervical spine was performed without intravenous contrast   Contiguous axial images were obtained  Sagittal and coronal reconstructions were performed  Radiation dose length product (DLP) for this visit:  76 310 744 mGy-cm  (accession [id number]), 314 mGy-cm  (accession [id number]), 326 mGy-cm  (accession [id number])    This examination, like all CT scans performed in the [facility], was performed utilizing techniques to minimize radiation dose exposure, including the use of iterative reconstruction and automated exposure control  IMAGE QUALITY:  Diagnostic  FINDINGS: ALIGNMENT:  Normal alignment of the cervical spine  No subluxation  VERTEBRAL BODIES:  Oblique fracture of the anteroinferior aspect of C2 (sagittal image 57, series 604)  Oblique fracture of the left transverse process of C3 (axial image 62, series 5)  Visualized bones otherwise appear intact  Anterior plate and screw fusion of C4-C7  Hardware appears intact  DEGENERATIVE CHANGES:  Intervertebral disc space narrowing at C2/C3, C3/C4, and C7/T1 with anterior, marginal, and uncovertebral osteophytosis at these levels  Multilevel facet joint arthropathy  PREVERTEBRAL AND PARASPINAL SOFT TISSUES:  Grossly unremarkable THORACIC INLET:  Old fracture of the left clavicle  Moderate to severe pulmonary emphysematous changes  Mild atherosclerosis  IMPRESSION: Oblique fracture of the anteroinferior aspect of C2 (sagittal image 57, series 604)  Oblique fracture of the left transverse process of C3 (axial image 62, series 5)  Recommend CTA of the neck to exclude vascular injury on the left  Spinal alignment appears maintained  Degenerative changes as described  Old fracture of the left clavicle, pulmonary emphysematous changes, and other findings as above  CT FACIAL BONES WITHOUT INTRAVENOUS CONTRAST INDICATION:   Head trauma, moderate-severe Fall, head injury, face injury, on Eliquis  COMPARISON: None  TECHNIQUE:  Axial CT images were obtained through the facial bones with additional sagittal and coronal reconstructions  Radiation dose length product (DLP) for this visit:  76 310 744 mGy-cm  (accession [id number]), 314 mGy-cm  (accession [id number]), 326 mGy-cm  (accession [id number])  This examination, like all CT scans performed in the [facility], was performed utilizing techniques to minimize radiation dose exposure, including the use of iterative reconstruction and automated exposure control  IMAGE QUALITY:  Diagnostic   FINDINGS: FACIAL BONES:  Nasal bone fracture  Normal alignment of the temporomandibular joints  No suspicious appearing osseous lesion  ORBITS:  Orbital globes, optic nerves, and extraocular muscles appear symmetric and normal  There is no evidence of retrobulbar mass, abscess, or hematoma  SINUSES:  Opacification of several bilateral ethmoid air cells; otherwise grossly unremarkable  SOFT TISSUES:  Perinasal soft tissue swelling and a small amount of subcutaneous emphysema  IMPRESSION: Nasal bone fracture with associated perinasal soft tissue swelling  The orbits appear intact  Other findings as above  I personally discussed this study with Raman Gooden on 8/21/2022 at 4:32 AM  Workstation performed: [id number]     CT facial bones wo contrast    Result Date: 8/21/2022  Impression: Large posterior vertex scalp hematoma/laceration, no calvarial fracture or acute intracranial abnormality is seen  Other findings as above  CT CERVICAL SPINE - WITHOUT CONTRAST INDICATION:   Head trauma, moderate-severe Fall, head injury, face injury, on Eliquis  COMPARISON:  None  TECHNIQUE:  CT examination of the cervical spine was performed without intravenous contrast   Contiguous axial images were obtained  Sagittal and coronal reconstructions were performed  Radiation dose length product (DLP) for this visit:  76 310 744 mGy-cm  (accession [id number]), 314 mGy-cm  (accession [id number]), 326 mGy-cm  (accession [id number])  This examination, like all CT scans performed in the [facility], was performed utilizing techniques to minimize radiation dose exposure, including the use of iterative reconstruction and automated exposure control  IMAGE QUALITY:  Diagnostic  FINDINGS: ALIGNMENT:  Normal alignment of the cervical spine  No subluxation  VERTEBRAL BODIES:  Oblique fracture of the anteroinferior aspect of C2 (sagittal image 57, series 604)  Oblique fracture of the left transverse process of C3 (axial image 62, series 5)    Visualized bones otherwise appear intact  Anterior plate and screw fusion of C4-C7  Hardware appears intact  DEGENERATIVE CHANGES:  Intervertebral disc space narrowing at C2/C3, C3/C4, and C7/T1 with anterior, marginal, and uncovertebral osteophytosis at these levels  Multilevel facet joint arthropathy  PREVERTEBRAL AND PARASPINAL SOFT TISSUES:  Grossly unremarkable THORACIC INLET:  Old fracture of the left clavicle  Moderate to severe pulmonary emphysematous changes  Mild atherosclerosis  IMPRESSION: Oblique fracture of the anteroinferior aspect of C2 (sagittal image 57, series 604)  Oblique fracture of the left transverse process of C3 (axial image 62, series 5)  Recommend CTA of the neck to exclude vascular injury on the left  Spinal alignment appears maintained  Degenerative changes as described  Old fracture of the left clavicle, pulmonary emphysematous changes, and other findings as above  CT FACIAL BONES WITHOUT INTRAVENOUS CONTRAST INDICATION:   Head trauma, moderate-severe Fall, head injury, face injury, on Eliquis  COMPARISON: None  TECHNIQUE:  Axial CT images were obtained through the facial bones with additional sagittal and coronal reconstructions  Radiation dose length product (DLP) for this visit:  76 310 744 mGy-cm  (accession [id number]), 314 mGy-cm  (accession [id number]), 326 mGy-cm  (accession [id number])  This examination, like all CT scans performed in the [facility], was performed utilizing techniques to minimize radiation dose exposure, including the use of iterative reconstruction and automated exposure control  IMAGE QUALITY:  Diagnostic  FINDINGS: FACIAL BONES:  Nasal bone fracture  Normal alignment of the temporomandibular joints  No suspicious appearing osseous lesion  ORBITS:  Orbital globes, optic nerves, and extraocular muscles appear symmetric and normal  There is no evidence of retrobulbar mass, abscess, or hematoma   SINUSES:  Opacification of several bilateral ethmoid air cells; otherwise grossly unremarkable  SOFT TISSUES:  Perinasal soft tissue swelling and a small amount of subcutaneous emphysema  IMPRESSION: Nasal bone fracture with associated perinasal soft tissue swelling  The orbits appear intact  Other findings as above  I personally discussed this study with Wes Barry on 8/21/2022 at 4:32 AM  Workstation performed: [id number]     CTA neck with and without contrast    Result Date: 8/21/2022  Impression: No vascular enhancement of left vertebral artery in the neck  However, in this patient with no acute/persistent neurologic symptoms, the appearance is most suggestive of anatomic variation rather than acute traumatic left vertebral artery injury, specifically as there is no enhancement even of left vertebral artery origin  There is enhancement of left vertebral artery and left posterior cerebellar artery above the level of foramen magnum presumably via collateral circulation across vertebral basilar junction  There are patent posterior communicating arteries bilaterally and the basilar artery is somewhat diminutive consistent with anatomic variation  Continued neurologic monitoring is recommended to exclude the development of neurologic symptoms  Atherosclerotic changes, more advanced stability expected for the patient's age but without flow-limiting atherosclerotic stenosis  Emphysematous changes in the visualized upper lung zones  Reidentification of cervical spine degenerative changes and no cervical spine fractures without malalignment  I personally discussed this study with Wes Barry on 8/21/2022 at 6:10 AM  Workstation performed: [id number]     CT spine cervical without contrast    Result Date: 8/21/2022  Impression: Large posterior vertex scalp hematoma/laceration, no calvarial fracture or acute intracranial abnormality is seen  Other findings as above   CT CERVICAL SPINE - WITHOUT CONTRAST INDICATION:   Head trauma, moderate-severe Fall, head injury, face injury, on Eliquis  COMPARISON:  None  TECHNIQUE:  CT examination of the cervical spine was performed without intravenous contrast   Contiguous axial images were obtained  Sagittal and coronal reconstructions were performed  Radiation dose length product (DLP) for this visit:  76 310 744 mGy-cm  (accession [id number]), 314 mGy-cm  (accession [id number]), 326 mGy-cm  (accession [id number])  This examination, like all CT scans performed in the [facility], was performed utilizing techniques to minimize radiation dose exposure, including the use of iterative reconstruction and automated exposure control  IMAGE QUALITY:  Diagnostic  FINDINGS: ALIGNMENT:  Normal alignment of the cervical spine  No subluxation  VERTEBRAL BODIES:  Oblique fracture of the anteroinferior aspect of C2 (sagittal image 57, series 604)  Oblique fracture of the left transverse process of C3 (axial image 62, series 5)  Visualized bones otherwise appear intact  Anterior plate and screw fusion of C4-C7  Hardware appears intact  DEGENERATIVE CHANGES:  Intervertebral disc space narrowing at C2/C3, C3/C4, and C7/T1 with anterior, marginal, and uncovertebral osteophytosis at these levels  Multilevel facet joint arthropathy  PREVERTEBRAL AND PARASPINAL SOFT TISSUES:  Grossly unremarkable THORACIC INLET:  Old fracture of the left clavicle  Moderate to severe pulmonary emphysematous changes  Mild atherosclerosis  IMPRESSION: Oblique fracture of the anteroinferior aspect of C2 (sagittal image 57, series 604)  Oblique fracture of the left transverse process of C3 (axial image 62, series 5)  Recommend CTA of the neck to exclude vascular injury on the left  Spinal alignment appears maintained  Degenerative changes as described  Old fracture of the left clavicle, pulmonary emphysematous changes, and other findings as above   CT FACIAL BONES WITHOUT INTRAVENOUS CONTRAST INDICATION:   Head trauma, moderate-severe Fall, head injury, face injury, on Eliquis  COMPARISON: None  TECHNIQUE:  Axial CT images were obtained through the facial bones with additional sagittal and coronal reconstructions  Radiation dose length product (DLP) for this visit:  76 310 744 mGy-cm  (accession [id number]), 314 mGy-cm  (accession [id number]), 326 mGy-cm  (accession [id number])  This examination, like all CT scans performed in the [facility], was performed utilizing techniques to minimize radiation dose exposure, including the use of iterative reconstruction and automated exposure control  IMAGE QUALITY:  Diagnostic  FINDINGS: FACIAL BONES:  Nasal bone fracture  Normal alignment of the temporomandibular joints  No suspicious appearing osseous lesion  ORBITS:  Orbital globes, optic nerves, and extraocular muscles appear symmetric and normal  There is no evidence of retrobulbar mass, abscess, or hematoma  SINUSES:  Opacification of several bilateral ethmoid air cells; otherwise grossly unremarkable  SOFT TISSUES:  Perinasal soft tissue swelling and a small amount of subcutaneous emphysema  IMPRESSION: Nasal bone fracture with associated perinasal soft tissue swelling  The orbits appear intact  Other findings as above  I personally discussed this study with Monika Layton on 8/21/2022 at 4:32 AM  Workstation performed: [id number]     Other Diagnostic Testing: I have personally reviewed pertinent reports      ACTIVE MEDICATIONS     Current Facility-Administered Medications   Medication Dose Route Frequency    acetaminophen (TYLENOL) tablet 975 mg  975 mg Oral Q8H [address]    albuterol inhalation solution 2 5 mg  2 5 mg Nebulization Q6H PRN    ampicillin-sulbactam (UNASYN) 3 g in sodium chloride 0 9 % 100 mL IVPB  3 g Intravenous Q6H    apixaban (ELIQUIS) tablet 5 mg  5 mg Oral BID    aspirin (ECOTRIN LOW STRENGTH) EC tablet 81 mg  81 mg Oral Daily    carbidopa-levodopa (SINEMET)  mg per tablet 2 tablet  2 tablet Oral 4x Daily    And    entacapone (COMTAN) tablet 200 mg  200 mg Oral 4x Daily    gabapentin (NEURONTIN) capsule 300 mg  300 mg Oral TID    HYDROmorphone HCl (DILAUDID) injection 0 2 mg  0 2 mg Intravenous Q4H PRN    lidocaine (LIDODERM) 5 % patch 1 patch  1 patch Topical Daily    morphine injection 2 mg  2 mg Intravenous Q3H PRN    nicotine (NICODERM CQ) 14 mg/24hr TD 24 hr patch 14 mg  14 mg Transdermal Daily    ondansetron (ZOFRAN) injection 4 mg  4 mg Intravenous Q4H PRN    oxyCODONE (ROXICODONE) IR tablet 2 5 mg  2 5 mg Oral Q4H PRN    oxyCODONE (ROXICODONE) IR tablet 5 mg  5 mg Oral Q4H PRN    PARoxetine (PAXIL) tablet 20 mg  20 mg Oral Daily    propranolol (INDERAL LA) 24 hr capsule 160 mg  160 mg Oral Daily    senna-docusate sodium (SENOKOT S) 8 6-50 mg per tablet 2 tablet  2 tablet Oral BID       VTE Pharmacologic Prophylaxis: Eliquis  VTE Mechanical Prophylaxis: sequential compression device      Disclaimer: Portions of the record may have been created with voice recognition software  Occasional wrong word or "sound a like" substitutions may have occurred due to the inherent limitations of voice recognition software  Careful consideration should be taken to recognize, using context, where substitutions have occurred      Elva Gaffney MD   Pulmonary and Critical Care Fellow, PGY-4  [facility]

## 2022-08-25 NOTE — CASE MANAGEMENT
Case Management Discharge Planning Note    Patient name Justine Hampton  Location [address] 627/PPHP 413-95 MRN [id number]  : 1960 Date 2022       Current Admission Date: 2022  Current Admission Diagnosis:C2 cervical fracture [facility])   Patient Active Problem List    Diagnosis Date Noted    Acute blood loss anemia 2022    [facility]    C2 cervical fracture ([facility] ) 2022    C3 cervical fracture ([facility] ) 2022    Nasal fracture 2022    Unspecified injury of left vertebral artery, initial encounter 2022    Scalp laceration, initial encounter 2022    Fall 2022    DVT (deep venous thrombosis) ([facility] ) 2022    Pulmonary emphysema (HCC)     MGUS (monoclonal gammopathy of unknown significance)       LOS (days): 4  Geometric Mean LOS (GMLOS) (days):   Days to GMLOS:     OBJECTIVE:  Risk of Unplanned Readmission Score: 11 52         Current admission status: Inpatient   Preferred Pharmacy:   [facility] [address] [facility] [address] [place] [address]  Phone: [phone] Fax: [phone]    Primary Care Provider: No primary care provider on file  Primary Insurance: Anju Centeno  Secondary Insurance:     DISCHARGE DETAILS:    Auth obtained for [facility]  Pt can d/c there today   Pt will need a COVID swab and a covid booster prior to d/c  CM placed transport via [facility] for today after 1800  Pt and  aware and in agreement

## 2022-08-26 PROBLEM — U07.1 COVID-19: Status: ACTIVE | Noted: 2022-08-26

## 2022-08-26 PROCEDURE — 99232 SBSQ HOSP IP/OBS MODERATE 35: CPT | Performed by: SURGERY

## 2022-08-26 PROCEDURE — 97530 THERAPEUTIC ACTIVITIES: CPT

## 2022-08-26 PROCEDURE — 97116 GAIT TRAINING THERAPY: CPT

## 2022-08-26 PROCEDURE — 94760 N-INVAS EAR/PLS OXIMETRY 1: CPT

## 2022-08-26 PROCEDURE — 94664 DEMO&/EVAL PT USE INHALER: CPT

## 2022-08-26 PROCEDURE — 94668 MNPJ CHEST WALL SBSQ: CPT

## 2022-08-26 RX ADMIN — CARBIDOPA AND LEVODOPA 2 TABLET: 25; 100 TABLET ORAL at 21:54

## 2022-08-26 RX ADMIN — ENTACAPONE 200 MG: 200 TABLET, FILM COATED ORAL at 21:55

## 2022-08-26 RX ADMIN — PROPRANOLOL HYDROCHLORIDE 160 MG: 80 CAPSULE, EXTENDED RELEASE ORAL at 10:11

## 2022-08-26 RX ADMIN — FORMOTEROL FUMARATE DIHYDRATE 20 MCG: 20 SOLUTION RESPIRATORY (INHALATION) at 08:12

## 2022-08-26 RX ADMIN — GABAPENTIN 300 MG: 300 CAPSULE ORAL at 18:40

## 2022-08-26 RX ADMIN — ACETAMINOPHEN 975 MG: 325 TABLET ORAL at 14:33

## 2022-08-26 RX ADMIN — APIXABAN 5 MG: 5 TABLET, FILM COATED ORAL at 18:40

## 2022-08-26 RX ADMIN — CARBIDOPA AND LEVODOPA 2 TABLET: 25; 100 TABLET ORAL at 10:10

## 2022-08-26 RX ADMIN — LIDOCAINE 5% 1 PATCH: 700 PATCH TOPICAL at 10:12

## 2022-08-26 RX ADMIN — GABAPENTIN 300 MG: 300 CAPSULE ORAL at 21:54

## 2022-08-26 RX ADMIN — BUDESONIDE 0.5 MG: 0.5 INHALANT ORAL at 08:12

## 2022-08-26 RX ADMIN — NICOTINE 14 MG: 14 PATCH, EXTENDED RELEASE TRANSDERMAL at 10:10

## 2022-08-26 RX ADMIN — AMPICILLIN SODIUM AND SULBACTAM SODIUM 3 G: 2; 1 INJECTION, POWDER, FOR SOLUTION INTRAMUSCULAR; INTRAVENOUS at 04:37

## 2022-08-26 RX ADMIN — CARBIDOPA AND LEVODOPA 2 TABLET: 25; 100 TABLET ORAL at 14:33

## 2022-08-26 RX ADMIN — SENNOSIDES AND DOCUSATE SODIUM 2 TABLET: 50; 8.6 TABLET ORAL at 10:10

## 2022-08-26 RX ADMIN — ASPIRIN 81 MG: 81 TABLET, COATED ORAL at 10:10

## 2022-08-26 RX ADMIN — APIXABAN 5 MG: 5 TABLET, FILM COATED ORAL at 10:10

## 2022-08-26 RX ADMIN — ACETAMINOPHEN 975 MG: 325 TABLET ORAL at 21:54

## 2022-08-26 RX ADMIN — ACETAMINOPHEN 975 MG: 325 TABLET ORAL at 05:43

## 2022-08-26 RX ADMIN — ENTACAPONE 200 MG: 200 TABLET, FILM COATED ORAL at 10:11

## 2022-08-26 RX ADMIN — CARBIDOPA AND LEVODOPA 2 TABLET: 25; 100 TABLET ORAL at 18:40

## 2022-08-26 RX ADMIN — GABAPENTIN 300 MG: 300 CAPSULE ORAL at 10:09

## 2022-08-26 RX ADMIN — SENNOSIDES AND DOCUSATE SODIUM 2 TABLET: 50; 8.6 TABLET ORAL at 18:40

## 2022-08-26 RX ADMIN — PAROXETINE HYDROCHLORIDE 20 MG: 20 TABLET, FILM COATED ORAL at 10:10

## 2022-08-26 RX ADMIN — AMPICILLIN SODIUM AND SULBACTAM SODIUM 3 G: 2; 1 INJECTION, POWDER, FOR SOLUTION INTRAMUSCULAR; INTRAVENOUS at 10:15

## 2022-08-26 RX ADMIN — ENTACAPONE 200 MG: 200 TABLET, FILM COATED ORAL at 14:34

## 2022-08-26 RX ADMIN — ENTACAPONE 200 MG: 200 TABLET, FILM COATED ORAL at 18:40

## 2022-08-26 NOTE — PHYSICAL THERAPY NOTE
Physical Therapy Progress Note     08/26/22 1025   PT Last Visit   PT Visit Date 08/26/22   Note Type   Note Type Treatment   Pain Assessment   Pain Score No Pain   Restrictions/Precautions   Braces or Orthoses C/S Collar   Other Precautions (S)  Fall Risk;O2;Chair Alarm; Bed Alarm  (COVID 19+)   Subjective   Subjective Pt encountered supine in bed, speaking with  on the phone  Reports no new complaints at this time,but is frustrated by new COVID diagnosis & inability to see her family at this time  Reports no new complaints except generalized fatigue with activity as noted below  Bed Mobility   Supine to Sit 5  Supervision   Additional items Assist x 1   Transfers   Sit to Stand 5  Supervision   Additional items Assist x 1; Armrests; Increased time required   Stand to Sit 5  Supervision   Additional items Assist x 1; Armrests; Increased time required   Ambulation/Elevation   Gait pattern Step to;Short stride; Foward flexed; Inconsistent carter; Shuffling;Decreased foot clearance; Improper Weight shift  (Parkinsonian gait)   Gait Assistance 4  Minimal assist   Additional items Assist x 1   Assistive Device Rolling walker   Distance 30' x 2   Balance   Static Sitting Fair   Static Standing Fair -   Ambulatory Poor +   Activity Tolerance   Activity Tolerance Patient tolerated treatment well;Patient limited by fatigue   Nurse [address], RN   Assessment   Prognosis Good   Problem List Decreased strength;Decreased endurance; Impaired balance;Decreased mobility   Assessment Pt demonstrated improved endurance today, ambulating short distances in her room without LOB & safely managing RW while doing so  Does fatigue quickly compared to baseline, requiring seated rests to recvoer  Pt maintained SpO2 ~95% on 2LO2 nc, but drops to 85-6% when attempting to ambulate on RA    Pt placed to O2 during each seated rest   Pt did report walking further in hallway with MANZO yesterday on RA without incident, which April Storm, MANZO confirmed  At this time, continue to recommend rehab given pt is ambulating below baseline & is a higher risk for falls at this time  Pt may progress to home pending LOS & improvements in respiratory status  Barriers to Discharge Inaccessible home environment   Barriers to Discharge Comments increased ambulation & CLIFTON with decreased assist   Goals   Patient Goals to get better   STG Expiration Date 08/22/22   PT Treatment Day 2   Plan   Treatment/Interventions Functional transfer training;LE strengthening/ROM; Elevations; Therapeutic exercise; Endurance training;Patient/family training;Equipment eval/education; Bed mobility;Gait training   Progress Progressing toward goals   PT Frequency Other (Comment)  (3-6x/week)   Recommendation   PT Discharge Recommendation Post acute rehabilitation services   Equipment Recommended [address] [facility] Recommended Wheeled walker   AM-PAC Basic Mobility Inpatient   Turning in Bed Without Bedrails 4   Lying on Back to Sitting on Edge of Flat Bed 4   Moving Bed to Chair 3   Standing Up From Chair 4   Walk in Room 3   Climb 3-5 Stairs 3   Basic Mobility Inpatient Raw Score 21   Basic Mobility Standardized Score 45 55   Highest Level Of Mobility   JH-HLM Goal 6: Walk 10 steps or more   JH-HLM Achieved 7: Walk 25 feet or more     Anh Angeles PTA    An [facility] Basic Mobility Standardized Score of 40 78 suggests pt would benefit from post acute rehab

## 2022-08-26 NOTE — PLAN OF CARE
Problem: MOBILITY - ADULT  Goal: Maintain or return to baseline ADL function  Description: INTERVENTIONS:  -  Assess patient's ability to carry out ADLs; assess patient's baseline for ADL function and identify physical deficits which impact ability to perform ADLs (bathing, care of mouth/teeth, toileting, grooming, dressing, etc )  - Assess/evaluate cause of self-care deficits   - Assess range of motion  - Assess patient's mobility; develop plan if impaired  - Assess patient's need for assistive devices and provide as appropriate  - Encourage maximum independence but intervene and supervise when necessary  - Involve family in performance of ADLs  - Assess for home care needs following discharge   - Consider OT consult to assist with ADL evaluation and planning for discharge  - Provide patient education as appropriate  Outcome: Progressing  Goal: Maintains/Returns to pre admission functional level  Description: INTERVENTIONS:  - Perform BMAT or MOVE assessment daily    - Set and communicate daily mobility goal to care team and patient/family/caregiver  - Collaborate with rehabilitation services on mobility goals if consulted  - Perform Range of Motion 3 times a day  - Reposition patient every 2 hours    - Dangle patient 3 times a day  - Stand patient 3 times a day  - Ambulate patient 3 times a day  - Out of bed to chair 3 times a day   - Out of bed for meals 3 times a day  - Out of bed for toileting  - Record patient progress and toleration of activity level   Outcome: Progressing     Problem: PAIN - ADULT  Goal: Verbalizes/displays adequate comfort level or baseline comfort level  Description: Interventions:  - Encourage patient to monitor pain and request assistance  - Assess pain using appropriate pain scale  - Administer analgesics based on type and severity of pain and evaluate response  - Implement non-pharmacological measures as appropriate and evaluate response  - Consider cultural and social influences on pain and pain management  - Notify physician/advanced practitioner if interventions unsuccessful or patient reports new pain  Outcome: Progressing     Problem: INFECTION - ADULT  Goal: Absence or prevention of progression during hospitalization  Description: INTERVENTIONS:  - Assess and monitor for signs and symptoms of infection  - Monitor lab/diagnostic results  - Monitor all insertion sites, i e  indwelling lines, tubes, and drains  - Monitor endotracheal if appropriate and nasal secretions for changes in amount and color  - [place] appropriate cooling/warming therapies per order  - Administer medications as ordered  - Instruct and encourage patient and family to use good hand hygiene technique  - Identify and instruct in appropriate isolation precautions for identified infection/condition  Outcome: Progressing     Problem: SAFETY ADULT  Goal: Maintain or return to baseline ADL function  Description: INTERVENTIONS:  -  Assess patient's ability to carry out ADLs; assess patient's baseline for ADL function and identify physical deficits which impact ability to perform ADLs (bathing, care of mouth/teeth, toileting, grooming, dressing, etc )  - Assess/evaluate cause of self-care deficits   - Assess range of motion  - Assess patient's mobility; develop plan if impaired  - Assess patient's need for assistive devices and provide as appropriate  - Encourage maximum independence but intervene and supervise when necessary  - Involve family in performance of ADLs  - Assess for home care needs following discharge   - Consider OT consult to assist with ADL evaluation and planning for discharge  - Provide patient education as appropriate  Outcome: Progressing  Goal: Maintains/Returns to pre admission functional level  Description: INTERVENTIONS:  - Perform BMAT or MOVE assessment daily    - Set and communicate daily mobility goal to care team and patient/family/caregiver     - Collaborate with rehabilitation services on mobility goals if consulted  - Perform Range of Motion 3 times a day  - Reposition patient every 2 hours    - Dangle patient 3 times a day  - Stand patient 3 times a day  - Ambulate patient 3 times a day  - Out of bed to chair 3 times a day   - Out of bed for meals 3 times a day  - Out of bed for toileting  - Record patient progress and toleration of activity level   Outcome: Progressing  Goal: Patient will remain free of falls  Description: INTERVENTIONS:  - Educate patient/family on patient safety including physical limitations  - Instruct patient to call for assistance with activity   - Consult OT/PT to assist with strengthening/mobility   - Keep Call bell within reach  - Keep bed low and locked with side rails adjusted as appropriate  - Keep care items and personal belongings within reach  - Initiate and maintain comfort rounds  - Make Fall Risk Sign visible to staff  - Offer Toileting every  Hours, in advance of need  - Initiate/Maintain alarm  - Obtain necessary fall risk management equipment:   - Apply yellow socks and bracelet for high fall risk patients  - Consider moving patient to room near nurses station  Outcome: Progressing     Problem: DISCHARGE PLANNING  Goal: Discharge to home or other facility with appropriate resources  Description: INTERVENTIONS:  - Identify barriers to discharge w/patient and caregiver  - Arrange for needed discharge resources and transportation as appropriate  - Identify discharge learning needs (meds, wound care, etc )  - Arrange for interpretive services to assist at discharge as needed  - Refer to Case Management Department for coordinating discharge planning if the patient needs post-hospital services based on physician/advanced practitioner order or complex needs related to functional status, cognitive ability, or social support system  Outcome: Progressing     Problem: Knowledge Deficit  Goal: Patient/family/caregiver demonstrates understanding of disease process, treatment plan, medications, and discharge instructions  Description: Complete learning assessment and assess knowledge base  Interventions:  - Provide teaching at level of understanding  - Provide teaching via preferred learning methods  Outcome: Progressing     Problem: Nutrition/Hydration-ADULT  Goal: Nutrient/Hydration intake appropriate for improving, restoring or maintaining nutritional needs  Description: Monitor and assess patient's nutrition/hydration status for malnutrition  Collaborate with interdisciplinary team and initiate plan and interventions as ordered  Monitor patient's weight and dietary intake as ordered or per policy  Utilize nutrition screening tool and intervene as necessary  Determine patient's food preferences and provide high-protein, high-caloric foods as appropriate       INTERVENTIONS:  - Monitor oral intake, urinary output, labs, and treatment plans  - Assess nutrition and hydration status and recommend course of action  - Evaluate amount of meals eaten  - Assist patient with eating if necessary   - Allow adequate time for meals  - Recommend/ encourage appropriate diets, oral nutritional supplements, and vitamin/mineral supplements  - Order, calculate, and assess calorie counts as needed  - Recommend, monitor, and adjust tube feedings and TPN/PPN based on assessed needs  - Assess need for intravenous fluids  - Provide specific nutrition/hydration education as appropriate  - Include patient/family/caregiver in decisions related to nutrition  Outcome: Progressing     Problem: Prexisting or High Potential for Compromised Skin Integrity  Goal: Skin integrity is maintained or improved  Description: INTERVENTIONS:  - Identify patients at risk for skin breakdown  - Assess and monitor skin integrity  - Assess and monitor nutrition and hydration status  - Monitor labs   - Assess for incontinence   - Turn and reposition patient  - Assist with mobility/ambulation  - Relieve pressure over bony prominences  - Avoid friction and shearing  - Provide appropriate hygiene as needed including keeping skin clean and dry  - Evaluate need for skin moisturizer/barrier cream  - Collaborate with interdisciplinary team   - Patient/family teaching  - Consider wound care consult   Outcome: Progressing     Problem: Potential for Falls  Goal: Patient will remain free of falls  Description: INTERVENTIONS:  - Educate patient/family on patient safety including physical limitations  - Instruct patient to call for assistance with activity   - Consult OT/PT to assist with strengthening/mobility   - Keep Call bell within reach  - Keep bed low and locked with side rails adjusted as appropriate  - Keep care items and personal belongings within reach  - Initiate and maintain comfort rounds  - Make Fall Risk Sign visible to staff  - Offer Toileting every  Hours, in advance of need  - Initiate/Maintain alarm  - Obtain necessary fall risk management equipment  - Apply yellow socks and bracelet for high fall risk patients  - Consider moving patient to room near nurses station  Outcome: Progressing     Problem: NEUROSENSORY - ADULT  Goal: Achieves stable or improved neurological status  Description: INTERVENTIONS  - Monitor and report changes in neurological status  - Monitor vital signs such as temperature, blood pressure, glucose, and any other labs ordered   - Initiate measures to prevent increased intracranial pressure  - Monitor for seizure activity and implement precautions if appropriate      Outcome: Progressing     Problem: CARDIOVASCULAR - ADULT  Goal: Maintains optimal cardiac output and hemodynamic stability  Description: INTERVENTIONS:  - Monitor I/O, vital signs and rhythm  - Monitor for S/S and trends of decreased cardiac output  - Administer and titrate ordered vasoactive medications to optimize hemodynamic stability  - Assess quality of pulses, skin color and temperature  - Assess for signs of decreased coronary artery perfusion  - Instruct patient to report change in severity of symptoms  Outcome: Progressing  Goal: Absence of cardiac dysrhythmias or at baseline rhythm  Description: INTERVENTIONS:  - Continuous cardiac monitoring, vital signs, obtain 12 lead EKG if ordered  - Administer antiarrhythmic and heart rate control medications as ordered  - Monitor electrolytes and administer replacement therapy as ordered  Outcome: Progressing     Problem: RESPIRATORY - ADULT  Goal: Achieves optimal ventilation and oxygenation  Description: INTERVENTIONS:  - Assess for changes in respiratory status  - Assess for changes in mentation and behavior  - Position to facilitate oxygenation and minimize respiratory effort  - Oxygen administered by appropriate delivery if ordered  - Initiate smoking cessation education as indicated  - Encourage broncho-pulmonary hygiene including cough, deep breathe, Incentive Spirometry  - Assess the need for suctioning and aspirate as needed  - Assess and instruct to report SOB or any respiratory difficulty  - Respiratory Therapy support as indicated  Outcome: Progressing     Problem: GASTROINTESTINAL - ADULT  Goal: Minimal or absence of nausea and/or vomiting  Description: INTERVENTIONS:  - Administer IV fluids if ordered to ensure adequate hydration  - Maintain NPO status until nausea and vomiting are resolved  - Nasogastric tube if ordered  - Administer ordered antiemetic medications as needed  - Provide nonpharmacologic comfort measures as appropriate  - Advance diet as tolerated, if ordered  - Consider nutrition services referral to assist patient with adequate nutrition and appropriate food choices  Outcome: Progressing  Goal: Maintains or returns to baseline bowel function  Description: INTERVENTIONS:  - Assess bowel function  - Encourage oral fluids to ensure adequate hydration  - Administer IV fluids if ordered to ensure adequate hydration  - Administer ordered medications as needed  - Encourage mobilization and activity  - Consider nutritional services referral to assist patient with adequate nutrition and appropriate food choices  Outcome: Progressing  Goal: Maintains adequate nutritional intake  Description: INTERVENTIONS:  - Monitor percentage of each meal consumed  - Identify factors contributing to decreased intake, treat as appropriate  - Assist with meals as needed  - Monitor I&O, weight, and lab values if indicated  - Obtain nutrition services referral as needed  Outcome: Progressing     Problem: GENITOURINARY - ADULT  Goal: Maintains or returns to baseline urinary function  Description: INTERVENTIONS:  - Assess urinary function  - Encourage oral fluids to ensure adequate hydration if ordered  - Administer IV fluids as ordered to ensure adequate hydration  - Administer ordered medications as needed  - Offer frequent toileting  - Follow urinary retention protocol if ordered  Outcome: Progressing     Problem: METABOLIC, FLUID AND ELECTROLYTES - ADULT  Goal: Electrolytes maintained within normal limits  Description: INTERVENTIONS:  - Monitor labs and assess patient for signs and symptoms of electrolyte imbalances  - Administer electrolyte replacement as ordered  - Monitor response to electrolyte replacements, including repeat lab results as appropriate  - Instruct patient on fluid and nutrition as appropriate  Outcome: Progressing  Goal: Fluid balance maintained  Description: INTERVENTIONS:  - Monitor labs   - Monitor I/O and WT  - Instruct patient on fluid and nutrition as appropriate  - Assess for signs & symptoms of volume excess or deficit  Outcome: Progressing     Problem: SKIN/TISSUE INTEGRITY - ADULT  Goal: Skin Integrity remains intact(Skin Breakdown Prevention)  Description: Assess:  -Perform Nate assessment every shift  -Clean and moisturize skin every day  -Inspect skin when repositioning, toileting, and assisting with ADLS  -Assess under medical devices such as masimo every shift  -Assess extremities for adequate circulation and sensation     Bed Management:  -Have minimal linens on bed & keep smooth, unwrinkled  -Change linens as needed when moist or perspiring  -Avoid sitting or lying in one position for more than 2 hours while in bed  -Keep HOB at 30 degrees     Toileting:  -Offer bedside commode  -Assess for incontinence every 4 hrs  -Use incontinent care products after each incontinent episode such as foam cleanser    Activity:  -Mobilize patient 3 times a day  -Encourage activity and walks on unit  -Encourage or provide ROM exercises   -Turn and reposition patient every 2 Hours  -Use appropriate equipment to lift or move patient in bed  -Instruct/ Assist with weight shifting every 1 hr when out of bed in chair  -Consider limitation of chair time 2 hour intervals    Skin Care:  -Avoid use of baby powder, tape, friction and shearing, hot water or constrictive clothing  -Relieve pressure over bony prominences using pillows  -Do not massage red bony areas    Outcome: Progressing  Goal: Incision(s), wounds(s) or drain site(s) healing without S/S of infection  Description: INTERVENTIONS  - Assess and document dressing, incision, wound bed, drain sites and surrounding tissue  - Provide patient and family education  - Perform skin care/dressing changes every day  Outcome: Progressing     Problem: HEMATOLOGIC - ADULT  Goal: Maintains hematologic stability  Description: INTERVENTIONS  - Assess for signs and symptoms of bleeding or hemorrhage  - Monitor labs  - Administer supportive blood products/factors as ordered and appropriate  Outcome: Progressing     Problem: MUSCULOSKELETAL - ADULT  Goal: Maintain or return mobility to safest level of function  Description: INTERVENTIONS:  - Assess patient's ability to carry out ADLs; assess patient's baseline for ADL function and identify physical deficits which impact ability to perform ADLs (bathing, care of mouth/teeth, toileting, grooming, dressing, etc )  - Assess/evaluate cause of self-care deficits   - Assess range of motion  - Assess patient's mobility  - Assess patient's need for assistive devices and provide as appropriate  - Encourage maximum independence but intervene and supervise when necessary  - Involve family in performance of ADLs  - Assess for home care needs following discharge   - Consider OT consult to assist with ADL evaluation and planning for discharge  - Provide patient education as appropriate  Outcome: Progressing     Problem: COPING  Goal: Pt/Family able to verbalize concerns and demonstrate effective coping strategies  Description: INTERVENTIONS:  - Assist patient/family to identify coping skills, available support systems and cultural and spiritual values  - Provide emotional support, including active listening and acknowledgement of concerns of patient and caregivers  - Reduce environmental stimuli, as able  - Provide patient education  - Assess for spiritual pain/suffering and initiate spiritual care, including notification of Pastoral Care or [place] based community as needed  - Assess effectiveness of coping strategies  Outcome: Progressing  Goal: Will report anxiety at manageable levels  Description: INTERVENTIONS:  - Administer medication as ordered  - Teach and encourage coping skills  - Provide emotional support  - Assess patient/family for anxiety and ability to cope  Outcome: Progressing

## 2022-08-26 NOTE — PROGRESS NOTES
[facility]  Progress Note Josiah Scriver 1960, 57 y o  female MRN: [id number]  Unit/Bed#: [facility] 627-01 Encounter: [id number]  Primary Care Provider: No primary care provider on file  Date and time admitted to hospital: 8/21/2022  9:49 AM    COVID-19  Assessment & Plan  · Patient found to be COVID positive prior to discharge  · Would consider starting patient on mild COVID pathway as this may be contributing to her increased oxygen requirements previously attributed to her COPD    MGUS (monoclonal gammopathy of unknown significance)  Assessment & Plan  · Per Epic review, MGUS w/ h/o recurrent DVTs on Eliquis/ASA      Pulmonary emphysema (HCC)  Assessment & Plan  · Arrived to [facility] on 3L supplemental O2 sating 100%  · PRN bronchodilators  · Q1hr I/S use  · 1PPD smoker  · Nicotine patch  · Goal SpO2 >88%  · Pulmonology consulted -- will need home oxygen evaluation and nebulizer machine for home    DVT (deep venous thrombosis) ([facility] [address] )  Assessment & Plan  · Continue home eliquis (resumed 8/22/22)    Fall  Assessment & Plan  · 8/21 mechanical fall d/t ambulatory dysfunction and Parkinson's disease  · No home assistive devices for ambulation  · PT/OT consulted      Scalp laceration, initial encounter  Assessment & Plan  · 11cm scalp laceration after fall  · S/p staple repair at OSH 8/21  · Staple removal 10-14 days  · Outpatient follow up trauma clinic    Unspecified injury of left vertebral artery, initial encounter  Assessment & Plan  8/21 CTA neck: No vascular enhancement of left vertebral artery in the neck  However, in this patient with no acute/persistent neurologic symptoms, the appearance is most suggestive of anatomic variation rather than acute traumatic left vertebral artery injury, specifically as there is no enhancement even of left vertebral artery origin    There is enhancement of left vertebral artery and left posterior cerebellar artery above the level of foramen magnum presumably via collateral circulation across vertebral basilar junction  There are patent posterior communicating arteries bilaterally and the basilar artery is somewhat diminutive consistent with anatomic variation  Neurosurgery consulted  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      Nasal fracture  Assessment & Plan  · 8/21 CT facial bones: nasal bone fracture  · Unasyn for PPX  · S/p operative fixation by OMFS on 8/22/22  · Sinus precautions    C3 cervical fracture (HCC)  Assessment & Plan  As per C2 cervical fracture plan      Parkinson disease ([facility] [address] )  Assessment & Plan  · Takes carbidopa-levodopa-entacapone (STALEVO) -200 MG per tablet  · paxil  · Propranolol for tremor    * C2 cervical fracture (HCC)  Assessment & Plan  · 8/21 CT cspine: anterioinferior C2 fx, L transverse process fx C3, old L clavicle fx  · Neurosurgery consulted  · Appreciate recommendations  · Cervical spine precautions  · Non-op in cervical collar, upright imaging          Disposition: continue med/surg level of care    SUBJECTIVE:    Subjective: No acute events overnight  OBJECTIVE:   Vitals:   Temp:  [98 1 °F (36 7 °C)-99 3 °F (37 4 °C)] 98 5 °F (36 9 °C)  HR:  [73-79] 74  Resp:  [16-18] 18  BP: (104-136)/(74-86) 136/78    Intake/Output:  I/O       08/24 0701  08/25 0700 08/25 0701  08/26 0700 08/26 0701  08/27 0700    P  O  168 600     IV Piggyback 200      Total Intake(mL/kg) 368 (8) 600 (13)     Urine (mL/kg/hr)  125 (0 1)     Total Output  125     Net +368 +475            Unmeasured Urine Occurrence 2 x 4 x     Unmeasured Stool Occurrence 1 x           Nutrition: Diet Dysphagia/Modified Consistency; Dysphagia 3-Dental Soft;  Thin Liquid  VTE Prophylaxis: eliquis     Physical Exam:   Gen:  NAD  HENT: MMM, cervical collar in place  CV:  RRR  Lungs:  nl effort on 2L NC  Abd:  soft, NT/ND  Ext:  no CCE  Skin:  no rashes  Neuro:  GCS 15, non-focal, A&Ox3     Invasive Devices  Report    Peripheral Intravenous Line  Duration           Peripheral IV 08/26/22 Right Wrist <1 day          Drain  Duration           External Urinary Catheter -- days                      Lab Results: Results: I have personally reviewed all pertinent laboratory/tests results  Imaging/EKG Studies: I have personally reviewed pertinent reports  Other Studies: I have personally reviewed pertinent reports

## 2022-08-26 NOTE — ASSESSMENT & PLAN NOTE
· 8/21 mechanical fall d/t ambulatory dysfunction and Parkinson's disease  · No home assistive devices for ambulation  · PT/OT consulted

## 2022-08-26 NOTE — PLAN OF CARE
Problem: PHYSICAL THERAPY ADULT  Goal: Performs mobility at highest level of function for planned discharge setting  See evaluation for individualized goals  Description: Treatment/Interventions: Functional transfer training, LE strengthening/ROM, Elevations, Therapeutic exercise, Endurance training, Equipment eval/education, Bed mobility, Gait training, Spoke to nursing, OT  Equipment Recommended:  (r/o rw)       See flowsheet documentation for full assessment, interventions and recommendations  Outcome: Progressing  Note: Prognosis: Good  Problem List: Decreased strength, Decreased endurance, Impaired balance, Decreased mobility  Assessment: Pt demonstrated improved endurance today, ambulating short distances in her room without LOB & safely managing RW while doing so  Does fatigue quickly compared to baseline, requiring seated rests to recvoer  Pt maintained SpO2 ~95% on 2LO2 nc, but drops to 85-6% when attempting to ambulate on RA  Pt placed to O2 during each seated rest   Pt did report walking further in hallway with MANZO yesterday on RA without incident, which April StormANAIS confirmed  At this time, continue to recommend rehab given pt is ambulating below baseline & is a higher risk for falls at this time  Pt may progress to home pending LOS & improvements in respiratory status  Barriers to Discharge: Inaccessible home environment  Barriers to Discharge Comments: increased ambulation & CLIFTON with decreased assist  PT Discharge Recommendation: Post acute rehabilitation services    See flowsheet documentation for full assessment

## 2022-08-26 NOTE — ASSESSMENT & PLAN NOTE
· Arrived to [facility] on 3L supplemental O2 sating 100%  · PRN bronchodilators  · Q1hr I/S use  · 1PPD smoker  · Nicotine patch  · Goal SpO2 >88%  · Pulmonology consulted -- will need home oxygen evaluation and nebulizer machine for home

## 2022-08-26 NOTE — ASSESSMENT & PLAN NOTE
· Patient found to be COVID positive prior to discharge  · Would consider starting patient on mild COVID pathway as this may be contributing to her increased oxygen requirements previously attributed to her COPD

## 2022-08-26 NOTE — CASE MANAGEMENT
Case Management Discharge Planning Note    Patient name Eduardo Reyes  Location [address] 627/PPHP 604-30 MRN [id number]  : 1960 Date 2022       Current Admission Date: 2022  Current Admission Diagnosis:C2 cervical fracture [facility])   Patient Active Problem List    Diagnosis Date Noted    COVID-19 2022    Acute blood loss anemia 2022    Parkinson disease ([facility] [address] ) 2022    C2 cervical fracture ([facility] [address] ) 2022    C3 cervical fracture ([facility] [address] ) 2022    Nasal fracture 2022    Unspecified injury of left vertebral artery, initial encounter 2022    Scalp laceration, initial encounter 2022    Fall 2022    DVT (deep venous thrombosis) ([facility] [address] ) 2022    Pulmonary emphysema (HCC)     MGUS (monoclonal gammopathy of unknown significance)       LOS (days): 5  Geometric Mean LOS (GMLOS) (days):   Days to GMLOS:     OBJECTIVE:  Risk of Unplanned Readmission Score: 11 61         Current admission status: Inpatient   Preferred Pharmacy:   [facility] [address] SAL [address] [place] [address] [place] [id number] [facility] [id number]  Phone: [phone] Fax: [phone]    Primary Care Provider: No primary care provider on file      Primary Insurance: [facility]  Secondary Insurance:     DISCHARGE DETAILS:    Mervin may be able to accept for COVID+ on monday

## 2022-08-27 PROCEDURE — 94760 N-INVAS EAR/PLS OXIMETRY 1: CPT

## 2022-08-27 PROCEDURE — 99232 SBSQ HOSP IP/OBS MODERATE 35: CPT | Performed by: SURGERY

## 2022-08-27 PROCEDURE — 94640 AIRWAY INHALATION TREATMENT: CPT

## 2022-08-27 RX ADMIN — ENTACAPONE 200 MG: 200 TABLET, FILM COATED ORAL at 13:55

## 2022-08-27 RX ADMIN — ENTACAPONE 200 MG: 200 TABLET, FILM COATED ORAL at 21:39

## 2022-08-27 RX ADMIN — CARBIDOPA AND LEVODOPA 2 TABLET: 25; 100 TABLET ORAL at 21:40

## 2022-08-27 RX ADMIN — NICOTINE 14 MG: 14 PATCH, EXTENDED RELEASE TRANSDERMAL at 09:40

## 2022-08-27 RX ADMIN — ENTACAPONE 200 MG: 200 TABLET, FILM COATED ORAL at 18:19

## 2022-08-27 RX ADMIN — APIXABAN 5 MG: 5 TABLET, FILM COATED ORAL at 18:18

## 2022-08-27 RX ADMIN — ENTACAPONE 200 MG: 200 TABLET, FILM COATED ORAL at 09:40

## 2022-08-27 RX ADMIN — BUDESONIDE 0.5 MG: 0.5 INHALANT ORAL at 08:33

## 2022-08-27 RX ADMIN — GABAPENTIN 300 MG: 300 CAPSULE ORAL at 18:18

## 2022-08-27 RX ADMIN — APIXABAN 5 MG: 5 TABLET, FILM COATED ORAL at 09:37

## 2022-08-27 RX ADMIN — ASPIRIN 81 MG: 81 TABLET, COATED ORAL at 09:37

## 2022-08-27 RX ADMIN — GABAPENTIN 300 MG: 300 CAPSULE ORAL at 09:36

## 2022-08-27 RX ADMIN — CARBIDOPA AND LEVODOPA 2 TABLET: 25; 100 TABLET ORAL at 13:54

## 2022-08-27 RX ADMIN — PAROXETINE HYDROCHLORIDE 20 MG: 20 TABLET, FILM COATED ORAL at 09:36

## 2022-08-27 RX ADMIN — SENNOSIDES AND DOCUSATE SODIUM 2 TABLET: 50; 8.6 TABLET ORAL at 18:18

## 2022-08-27 RX ADMIN — ACETAMINOPHEN 975 MG: 325 TABLET ORAL at 13:55

## 2022-08-27 RX ADMIN — ACETAMINOPHEN 975 MG: 325 TABLET ORAL at 21:40

## 2022-08-27 RX ADMIN — CARBIDOPA AND LEVODOPA 2 TABLET: 25; 100 TABLET ORAL at 18:18

## 2022-08-27 RX ADMIN — SENNOSIDES AND DOCUSATE SODIUM 2 TABLET: 50; 8.6 TABLET ORAL at 09:37

## 2022-08-27 RX ADMIN — FORMOTEROL FUMARATE DIHYDRATE 20 MCG: 20 SOLUTION RESPIRATORY (INHALATION) at 08:33

## 2022-08-27 RX ADMIN — LIDOCAINE 5% 1 PATCH: 700 PATCH TOPICAL at 09:34

## 2022-08-27 RX ADMIN — CARBIDOPA AND LEVODOPA 2 TABLET: 25; 100 TABLET ORAL at 09:40

## 2022-08-27 RX ADMIN — GABAPENTIN 300 MG: 300 CAPSULE ORAL at 21:40

## 2022-08-27 NOTE — PLAN OF CARE
Problem: PAIN - ADULT  Goal: Verbalizes/displays adequate comfort level or baseline comfort level  Description: Interventions:  - Encourage patient to monitor pain and request assistance  - Assess pain using appropriate pain scale  - Administer analgesics based on type and severity of pain and evaluate response  - Implement non-pharmacological measures as appropriate and evaluate response  - Consider cultural and social influences on pain and pain management  - Notify physician/advanced practitioner if interventions unsuccessful or patient reports new pain  Outcome: Progressing     Problem: SAFETY ADULT  Goal: Patient will remain free of falls  Description: INTERVENTIONS:  - Educate patient/family on patient safety including physical limitations  - Instruct patient to call for assistance with activity   - Consult OT/PT to assist with strengthening/mobility   - Keep Call bell within reach  - Keep bed low and locked with side rails adjusted as appropriate  - Keep care items and personal belongings within reach  - Initiate and maintain comfort rounds  - Make Fall Risk Sign visible to staff  - Offer Toileting every  Hours, in advance of need  - Initiate/Maintain alarm  - Obtain necessary fall risk management equipment:   - Apply yellow socks and bracelet for high fall risk patients  - Consider moving patient to room near nurses station  Outcome: Progressing     Problem: Prexisting or High Potential for Compromised Skin Integrity  Goal: Skin integrity is maintained or improved  Description: INTERVENTIONS:  - Identify patients at risk for skin breakdown  - Assess and monitor skin integrity  - Assess and monitor nutrition and hydration status  - Monitor labs   - Assess for incontinence   - Turn and reposition patient  - Assist with mobility/ambulation  - Relieve pressure over bony prominences  - Avoid friction and shearing  - Provide appropriate hygiene as needed including keeping skin clean and dry  - Evaluate need for skin moisturizer/barrier cream  - Collaborate with interdisciplinary team   - Patient/family teaching  - Consider wound care consult   Outcome: Progressing     Problem: Potential for Falls  Goal: Patient will remain free of falls  Description: INTERVENTIONS:  - Educate patient/family on patient safety including physical limitations  - Instruct patient to call for assistance with activity   - Consult OT/PT to assist with strengthening/mobility   - Keep Call bell within reach  - Keep bed low and locked with side rails adjusted as appropriate  - Keep care items and personal belongings within reach  - Initiate and maintain comfort rounds  - Make Fall Risk Sign visible to staff  - Offer Toileting every Hours, in advance of need  - Initiate/Maintain alarm  - Obtain necessary fall risk management equipment:   - Apply yellow socks and bracelet for high fall risk patients  - Consider moving patient to room near nurses station  Outcome: Progressing

## 2022-08-27 NOTE — PROGRESS NOTES
[address] [facility]  Progress Note Sheree Craven 1960, 57 y o  female MRN: [id number]  Unit/Bed#: [facility] 627-01 Encounter: [id number]  Primary Care Provider: No primary care provider on file  Date and time admitted to hospital: 8/21/2022  9:49 AM    COVID-19  Assessment & Plan  · Patient found to be COVID positive prior to discharge  · Would consider starting patient on mild COVID pathway as this may be contributing to her increased oxygen requirements previously attributed to her COPD    Acute blood loss anemia  Assessment & Plan  · Likely 2/2 scalp laceration losses  · S/p 1u PRBC with appropriate response and stability  · Daily CBC    MGUS (monoclonal gammopathy of unknown significance)  Assessment & Plan  · Per Epic review, MGUS w/ h/o recurrent DVTs on Eliquis/ASA      Pulmonary emphysema (HCC)  Assessment & Plan  · Arrived to B on 3L supplemental O2 sating 100%  · PRN bronchodilators  · Q1hr I/S use  · 1PPD smoker  · Nicotine patch  · Goal SpO2 >88%  · Pulmonology consulted -- will need home oxygen evaluation and nebulizer machine for home    DVT (deep venous thrombosis) ([facility] [address] )  Assessment & Plan  · Continue home eliquis (resumed 8/22/22)    Fall  Assessment & Plan  · 8/21 mechanical fall d/t ambulatory dysfunction and Parkinson's disease  · No home assistive devices for ambulation  · PT/OT consulted      Scalp laceration, initial encounter  Assessment & Plan  · 11cm scalp laceration after fall  · S/p staple repair at OSH 8/21  · Staple removal 10-14 days  · Outpatient follow up trauma clinic    Unspecified injury of left vertebral artery, initial encounter  Assessment & Plan  8/21 CTA neck: No vascular enhancement of left vertebral artery in the neck    However, in this patient with no acute/persistent neurologic symptoms, the appearance is most suggestive of anatomic variation rather than acute traumatic left vertebral artery injury, specifically as there is no enhancement even of left vertebral artery origin  There is enhancement of left vertebral artery and left posterior cerebellar artery above the level of foramen magnum presumably via collateral circulation across vertebral basilar junction  There are patent posterior communicating arteries bilaterally and the basilar artery is somewhat diminutive consistent with anatomic variation  Neurosurgery consulted  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      Nasal fracture  Assessment & Plan  · 8/21 CT facial bones: nasal bone fracture  · Unasyn for PPX  · S/p operative fixation by OMFS on 8/22/22  · Sinus precautions    C3 cervical fracture (HCC)  Assessment & Plan  As per C2 cervical fracture plan      Parkinson disease (HCC)  Assessment & Plan  · Takes carbidopa-levodopa-entacapone (STALEVO) -200 MG per tablet  · paxil  · Propranolol for tremor    * C2 cervical fracture (HCC)  Assessment & Plan  · 8/21 CT cspine: anterioinferior C2 fx, L transverse process fx C3, old L clavicle fx  · Neurosurgery consulted  · Appreciate recommendations  · Cervical spine precautions  · Non-op in cervical collar, upright imaging        Disposition: med/surg    SUBJECTIVE:  Chief Complaint: None    Subjective: no complaints     OBJECTIVE:   Vitals:   Temp:  [98 7 °F (37 1 °C)-98 9 °F (37 2 °C)] 98 7 °F (37 1 °C)  HR:  [79-86] 86  Resp:  [16-17] 16  BP: (109-139)/(59-85) 109/59    Intake/Output:  I/O       08/25 0701  08/26 0700 08/26 0701  08/27 0700 08/27 0701  08/28 0700    P  O  600 218     IV Piggyback 100      Total Intake(mL/kg) 700 (15 2) 218 (4 7)     Urine (mL/kg/hr) 125 (0 1) 500 (0 5)     Total Output 125 500     Net +575 -282            Unmeasured Urine Occurrence 4 x 1 x          Nutrition: Diet Dysphagia/Modified Consistency; Dysphagia 3-Dental Soft;  Thin Liquid  GI Proph/Bowel Reg: see mar  VTE Prophylaxis: sharon    Physical Exam:   GENERAL APPEARANCE: Patient in no acute distress  HEENT: NCAT; PERRL, EOMs intact; Mucous membranes moist  NECK / BACK: in c-collar  CV: Regular rate and rhythm; no murmur/gallops/rubs appreciated  CHEST / LUNGS: Clear to auscultation; no wheezes/rales/rhonci  ABD: NABS; soft; non-distended; non-tender  EXT: +2 pulses bilaterally upper & lower extremities; no edema  NEURO: GCS 15; no focal neurologic deficits; neurovascularly intact  SKIN: Warm, dry and well perfused; no rash; no jaundice  Invasive Devices  Report    Peripheral Intravenous Line  Duration           Peripheral IV 08/26/22 Right Wrist 1 day          Drain  Duration           External Urinary Catheter -- days                      Lab Results: BMP/CMP: No results found for: SODIUM, K, CL, CO2, ANIONGAP, BUN, CREATININE, GLUCOSE, CALCIUM, AST, ALT, ALKPHOS, PROT, BILITOT, EGFR  Imaging/EKG Studies: I have personally reviewed pertinent reports       Other Studies:

## 2022-08-28 PROCEDURE — 94640 AIRWAY INHALATION TREATMENT: CPT

## 2022-08-28 PROCEDURE — 97535 SELF CARE MNGMENT TRAINING: CPT

## 2022-08-28 PROCEDURE — 94760 N-INVAS EAR/PLS OXIMETRY 1: CPT

## 2022-08-28 PROCEDURE — 99232 SBSQ HOSP IP/OBS MODERATE 35: CPT | Performed by: SURGERY

## 2022-08-28 RX ADMIN — LIDOCAINE 5% 1 PATCH: 700 PATCH TOPICAL at 08:32

## 2022-08-28 RX ADMIN — CARBIDOPA AND LEVODOPA 2 TABLET: 25; 100 TABLET ORAL at 22:39

## 2022-08-28 RX ADMIN — APIXABAN 5 MG: 5 TABLET, FILM COATED ORAL at 08:32

## 2022-08-28 RX ADMIN — PROPRANOLOL HYDROCHLORIDE 160 MG: 80 CAPSULE, EXTENDED RELEASE ORAL at 08:33

## 2022-08-28 RX ADMIN — CARBIDOPA AND LEVODOPA 2 TABLET: 25; 100 TABLET ORAL at 08:32

## 2022-08-28 RX ADMIN — ASPIRIN 81 MG: 81 TABLET, COATED ORAL at 08:32

## 2022-08-28 RX ADMIN — GABAPENTIN 300 MG: 300 CAPSULE ORAL at 08:32

## 2022-08-28 RX ADMIN — SENNOSIDES AND DOCUSATE SODIUM 2 TABLET: 50; 8.6 TABLET ORAL at 18:16

## 2022-08-28 RX ADMIN — GABAPENTIN 300 MG: 300 CAPSULE ORAL at 18:17

## 2022-08-28 RX ADMIN — PAROXETINE HYDROCHLORIDE 20 MG: 20 TABLET, FILM COATED ORAL at 08:32

## 2022-08-28 RX ADMIN — ENTACAPONE 200 MG: 200 TABLET, FILM COATED ORAL at 13:23

## 2022-08-28 RX ADMIN — CARBIDOPA AND LEVODOPA 2 TABLET: 25; 100 TABLET ORAL at 13:22

## 2022-08-28 RX ADMIN — GABAPENTIN 300 MG: 300 CAPSULE ORAL at 22:40

## 2022-08-28 RX ADMIN — ENTACAPONE 200 MG: 200 TABLET, FILM COATED ORAL at 18:16

## 2022-08-28 RX ADMIN — ACETAMINOPHEN 975 MG: 325 TABLET ORAL at 13:22

## 2022-08-28 RX ADMIN — APIXABAN 5 MG: 5 TABLET, FILM COATED ORAL at 18:16

## 2022-08-28 RX ADMIN — ENTACAPONE 200 MG: 200 TABLET, FILM COATED ORAL at 22:39

## 2022-08-28 RX ADMIN — NICOTINE 14 MG: 14 PATCH, EXTENDED RELEASE TRANSDERMAL at 08:32

## 2022-08-28 RX ADMIN — BUDESONIDE 0.5 MG: 0.5 INHALANT ORAL at 20:36

## 2022-08-28 RX ADMIN — FORMOTEROL FUMARATE DIHYDRATE 20 MCG: 20 SOLUTION RESPIRATORY (INHALATION) at 20:36

## 2022-08-28 RX ADMIN — CARBIDOPA AND LEVODOPA 2 TABLET: 25; 100 TABLET ORAL at 18:16

## 2022-08-28 RX ADMIN — SENNOSIDES AND DOCUSATE SODIUM 2 TABLET: 50; 8.6 TABLET ORAL at 08:32

## 2022-08-28 RX ADMIN — ENTACAPONE 200 MG: 200 TABLET, FILM COATED ORAL at 08:33

## 2022-08-28 NOTE — ASSESSMENT & PLAN NOTE
· 11cm scalp laceration after fall  · S/p staple repair at OSH 8/21  · Staple removal 10-14 days (8/31-9/4)  · Outpatient follow up trauma clinic

## 2022-08-28 NOTE — PROGRESS NOTES
Progress Note - Trauma   Dorota Doing 60 y o  female [id number]   Unit/Bed#: [facility] 627-01 Encounter: [id number]     ASSESSMENT/PLAN:  COVID-19  Assessment & Plan  · Patient found to be COVID positive prior to discharge  · Weigh risks and benefits of mild COVID pathway  Consider starting if patient has hypoxia or increasing O2 requirement  Acute blood loss anemia  Assessment & Plan  · Likely 2/2 scalp laceration losses  · S/p 1u PRBC with appropriate response and stability  · Stable hemoglobin since admission  · Check Hgb if any change in hemodynamics or suspicion for blood loss      MGUS (monoclonal gammopathy of unknown significance)  Assessment & Plan  · Per Epic review, MGUS w/ h/o recurrent DVTs on Eliquis/ASA      Pulmonary emphysema ([facility] [address] )  Assessment & Plan  · Arrived to [facility] on 3L supplemental O2 sating 100%  · PRN bronchodilators  · Q1hr I/S use  · 1PPD smoker  · Nicotine patch  · Goal SpO2 >88%  · Pulmonology consulted -- will need home oxygen evaluation and nebulizer machine for home    DVT (deep venous thrombosis) ([facility] [address] )  Assessment & Plan  · Continue home eliquis (resumed 8/22/22)    Fall  Assessment & Plan  · 8/21 mechanical fall d/t ambulatory dysfunction and Parkinson's disease  · No home assistive devices for ambulation  · PT/OT consulted      Scalp laceration, initial encounter  Assessment & Plan  · 11cm scalp laceration after fall  · S/p staple repair at OSH 8/21  · Staple removal 10-14 days  · Outpatient follow up trauma clinic    Unspecified injury of left vertebral artery, initial encounter  Assessment & Plan  8/21 CTA neck: "No vascular enhancement of left vertebral artery in the neck  However, in this patient with no acute/persistent neurologic symptoms, the appearance is most suggestive of anatomic variation rather than acute traumatic left vertebral artery injury, specifically as there is no enhancement even of left vertebral artery origin    There is enhancement of left vertebral artery and left posterior cerebellar artery above the level of foramen magnum presumably via collateral circulation across vertebral basilar junction  There are patent posterior communicating arteries bilaterally and the basilar artery is somewhat diminutive consistent with anatomic variation  "    - Neurosurgery consulted  - 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      Nasal fracture  Assessment & Plan  · 8/21 CT facial bones: nasal bone fracture  · Unasyn for PPX  · S/p operative fixation by OMFS on 8/22/22  · Sinus precautions    C3 cervical fracture (HCC)  Assessment & Plan  As per C2 cervical fracture plan      Parkinson disease ([facility] [address] )  Assessment & Plan  · Continue home carbidopa-levodopa-entacapone (STALEVO) -200 MG per tablet  · paxil  · Propranolol for tremor    * C2 cervical fracture (HCC)  Assessment & Plan  · 8/21 CT cspine: anterioinferior C2 fx, L transverse process fx C3, old L clavicle fx  · Neurosurgery consulted  · Appreciate recommendations  · Cervical spine precautions  · Non-op in cervical collar, upright imaging      Disposition: Monitor due to COVID precautions, pending placement  Ongoing MS level of care appropriate  SUBJECTIVE:  Chief Complaint:  I feel good, I want to get out of here    Subjective:  Patient was seen and wished a happy birthday today, she has no new pain complaints today  There were no acute events overnight  She has been maintaining SpO2 at goal with only 1 L nasal cannula  She is still denying any COVID symptoms at this time  In speaking with Case Management, there are possible places she can go even with positive COVID test   Awaiting responses from those facilities at this time  Patient is eager to be discharged to rehab facility      OBJECTIVE:   Vitals:   Temp:  [97 9 °F (36 6 °C)-99 2 °F (37 3 °C)] 99 2 °F (37 3 °C)  HR: [77-94] 81  Resp:  [16-20] 16  BP: (113-150)/(70-93) 141/76    Intake/Output:  I/O       08/26 0701  08/27 0700 08/27 0701  08/28 0700 08/28 0701  08/29 0700    P  O  218      IV Piggyback       Total Intake(mL/kg) 218 (4 7)      Urine (mL/kg/hr) 500 (0 5) 500 (0 5)     Total Output 500 500     Net -282 -500            Unmeasured Urine Occurrence 1 x           Nutrition: Diet Dysphagia/Modified Consistency; Dysphagia 3-Dental Soft; Thin Liquid  GI Prophylaxis/Bowel Regimen:  Scheduled Senokot  VTE Prophylaxis: Eliquis    Physical Exam:   GENERAL APPEARANCE:  NAD, comfortable appearing  NEURO:  GCS 15, no focal neurologic deficit, EPS very notable on exam today  HEENT:  NC/AT, EOM intact  CV:  RRR, no M/R/G  LUNGS:  CTA B  GI:  Soft, NT, ND  :  External drain in place, no breakdown  MSK:  MANCERA spontaneously, no pedal edema  SKIN:  No obvious breakdowns or DTIs, warm, dry, well perfused    Invasive Devices  Report    Peripheral Intravenous Line  Duration           Peripheral IV 08/26/22 Right Wrist 2 days          Drain  Duration           External Urinary Catheter -- days                      Lab Results:  No new labs today  Imaging/EKG Studies: I have personally reviewed pertinent reports      No new imaging today  Other Studies:  No new studies pending today

## 2022-08-28 NOTE — PLAN OF CARE
Problem: OCCUPATIONAL THERAPY ADULT  Goal: Performs self-care activities at highest level of function for planned discharge setting  See evaluation for individualized goals  Description: Treatment Interventions: ADL retraining, Functional transfer training, UE strengthening/ROM, Endurance training, Patient/family training, Equipment evaluation/education, Compensatory technique education, Continued evaluation, Energy conservation, Activityengagement          See flowsheet documentation for full assessment, interventions and recommendations  Outcome: Progressing  Note: Limitation: Decreased ADL status, Decreased Safe judgement during ADL, Decreased endurance, Decreased high-level ADLs, Decreased self-care trans  Prognosis: Fair  Assessment: Pt stated " I would like to go to the bathroom before we do any  Pt was seen for Occupational Therapy session with focus on activity tolerance, bed mob, functional transfers/mob, sitting balance and tolerance for pt engagement in UB/LB self-care tasks and energy conservation techniques  Pt cleared by RN/Berry for pt participated in OT session  Pt presented supine/HOB raised pt awake/alert and agreeable to participate in therapy following pt identifiers confirmed  Pt did not report her therapy goal this session  Pt required assist for 2* functional transfers/mob 2* pt increased activity tolerance  SHe was able to tolerate standing at bathroom sink for grooming task however required assist for LB dressing 2* pt decreased activity tolerance/endurance  Pt will require post acute rehab service to continue to address these above noted pt deficit which currently impair pt ADL and functional mob  Pt return to bedside chair post session, and all needs within reach    Recommendation: (S) Physiatry Consult  OT Discharge Recommendation: Post acute rehabilitation services

## 2022-08-28 NOTE — PLAN OF CARE
Problem: MOBILITY - ADULT  Goal: Maintain or return to baseline ADL function  Description: INTERVENTIONS:  -  Assess patient's ability to carry out ADLs; assess patient's baseline for ADL function and identify physical deficits which impact ability to perform ADLs (bathing, care of mouth/teeth, toileting, grooming, dressing, etc )  - Assess/evaluate cause of self-care deficits   - Assess range of motion  - Assess patient's mobility; develop plan if impaired  - Assess patient's need for assistive devices and provide as appropriate  - Encourage maximum independence but intervene and supervise when necessary  - Involve family in performance of ADLs  - Assess for home care needs following discharge   - Consider OT consult to assist with ADL evaluation and planning for discharge  - Provide patient education as appropriate  Outcome: Progressing  Goal: Maintains/Returns to pre admission functional level  Description: INTERVENTIONS:  - Perform BMAT or MOVE assessment daily    - Set and communicate daily mobility goal to care team and patient/family/caregiver  - Collaborate with rehabilitation services on mobility goals if consulted  - Perform Range of Motion 3 times a day  - Reposition patient every 2 hours    - Dangle patient 3 times a day  - Stand patient 3 times a day  - Ambulate patient 3 times a day  - Out of bed to chair 3 times a day   - Out of bed for meals 3 times a day  - Out of bed for toileting  - Record patient progress and toleration of activity level   Outcome: Progressing     Problem: PAIN - ADULT  Goal: Verbalizes/displays adequate comfort level or baseline comfort level  Description: Interventions:  - Encourage patient to monitor pain and request assistance  - Assess pain using appropriate pain scale  - Administer analgesics based on type and severity of pain and evaluate response  - Implement non-pharmacological measures as appropriate and evaluate response  - Consider cultural and social influences on pain and pain management  - Notify physician/advanced practitioner if interventions unsuccessful or patient reports new pain  Outcome: Progressing     Problem: INFECTION - ADULT  Goal: Absence or prevention of progression during hospitalization  Description: INTERVENTIONS:  - Assess and monitor for signs and symptoms of infection  - Monitor lab/diagnostic results  - Monitor all insertion sites, i e  indwelling lines, tubes, and drains  - Monitor endotracheal if appropriate and nasal secretions for changes in amount and color  - [place] appropriate cooling/warming therapies per order  - Administer medications as ordered  - Instruct and encourage patient and family to use good hand hygiene technique  - Identify and instruct in appropriate isolation precautions for identified infection/condition  Outcome: Progressing     Problem: SAFETY ADULT  Goal: Maintain or return to baseline ADL function  Description: INTERVENTIONS:  -  Assess patient's ability to carry out ADLs; assess patient's baseline for ADL function and identify physical deficits which impact ability to perform ADLs (bathing, care of mouth/teeth, toileting, grooming, dressing, etc )  - Assess/evaluate cause of self-care deficits   - Assess range of motion  - Assess patient's mobility; develop plan if impaired  - Assess patient's need for assistive devices and provide as appropriate  - Encourage maximum independence but intervene and supervise when necessary  - Involve family in performance of ADLs  - Assess for home care needs following discharge   - Consider OT consult to assist with ADL evaluation and planning for discharge  - Provide patient education as appropriate  Outcome: Progressing  Goal: Maintains/Returns to pre admission functional level  Description: INTERVENTIONS:  - Perform BMAT or MOVE assessment daily    - Set and communicate daily mobility goal to care team and patient/family/caregiver     - Collaborate with rehabilitation services on mobility goals if consulted  - Perform Range of Motion 3 times a day  - Reposition patient every 2 hours    - Dangle patient 3 times a day  - Stand patient 3 times a day  - Ambulate patient 3 times a day  - Out of bed to chair 3 times a day   - Out of bed for meals 3 times a day  - Out of bed for toileting  - Record patient progress and toleration of activity level   Outcome: Progressing  Goal: Patient will remain free of falls  Description: INTERVENTIONS:  - Educate patient/family on patient safety including physical limitations  - Instruct patient to call for assistance with activity   - Consult OT/PT to assist with strengthening/mobility   - Keep Call bell within reach  - Keep bed low and locked with side rails adjusted as appropriate  - Keep care items and personal belongings within reach  - Initiate and maintain comfort rounds  - Make Fall Risk Sign visible to staff  - Offer Toileting every 1 Hours, in advance of need  - Initiate/Maintain alarm  - Obtain necessary fall risk management equipment  - Apply yellow socks and bracelet for high fall risk patients  - Consider moving patient to room near nurses station  Outcome: Progressing     Problem: DISCHARGE PLANNING  Goal: Discharge to home or other facility with appropriate resources  Description: INTERVENTIONS:  - Identify barriers to discharge w/patient and caregiver  - Arrange for needed discharge resources and transportation as appropriate  - Identify discharge learning needs (meds, wound care, etc )  - Arrange for interpretive services to assist at discharge as needed  - Refer to Case Management Department for coordinating discharge planning if the patient needs post-hospital services based on physician/advanced practitioner order or complex needs related to functional status, cognitive ability, or social support system  Outcome: Progressing     Problem: Knowledge Deficit  Goal: Patient/family/caregiver demonstrates understanding of disease process, treatment plan, medications, and discharge instructions  Description: Complete learning assessment and assess knowledge base  Interventions:  - Provide teaching at level of understanding  - Provide teaching via preferred learning methods  Outcome: Progressing     Problem: Nutrition/Hydration-ADULT  Goal: Nutrient/Hydration intake appropriate for improving, restoring or maintaining nutritional needs  Description: Monitor and assess patient's nutrition/hydration status for malnutrition  Collaborate with interdisciplinary team and initiate plan and interventions as ordered  Monitor patient's weight and dietary intake as ordered or per policy  Utilize nutrition screening tool and intervene as necessary  Determine patient's food preferences and provide high-protein, high-caloric foods as appropriate       INTERVENTIONS:  - Monitor oral intake, urinary output, labs, and treatment plans  - Assess nutrition and hydration status and recommend course of action  - Evaluate amount of meals eaten  - Assist patient with eating if necessary   - Allow adequate time for meals  - Recommend/ encourage appropriate diets, oral nutritional supplements, and vitamin/mineral supplements  - Order, calculate, and assess calorie counts as needed  - Recommend, monitor, and adjust tube feedings and TPN/PPN based on assessed needs  - Assess need for intravenous fluids  - Provide specific nutrition/hydration education as appropriate  - Include patient/family/caregiver in decisions related to nutrition  Outcome: Progressing     Problem: Prexisting or High Potential for Compromised Skin Integrity  Goal: Skin integrity is maintained or improved  Description: INTERVENTIONS:  - Identify patients at risk for skin breakdown  - Assess and monitor skin integrity  - Assess and monitor nutrition and hydration status  - Monitor labs   - Assess for incontinence   - Turn and reposition patient  - Assist with mobility/ambulation  - Relieve pressure over bony prominences  - Avoid friction and shearing  - Provide appropriate hygiene as needed including keeping skin clean and dry  - Evaluate need for skin moisturizer/barrier cream  - Collaborate with interdisciplinary team   - Patient/family teaching  - Consider wound care consult   Outcome: Progressing     Problem: Potential for Falls  Goal: Patient will remain free of falls  Description: INTERVENTIONS:  - Educate patient/family on patient safety including physical limitations  - Instruct patient to call for assistance with activity   - Consult OT/PT to assist with strengthening/mobility   - Keep Call bell within reach  - Keep bed low and locked with side rails adjusted as appropriate  - Keep care items and personal belongings within reach  - Initiate and maintain comfort rounds  - Make Fall Risk Sign visible to staff  - Offer Toileting every 1 Hours, in advance of need  - Initiate/Maintain alarm  - Obtain necessary fall risk management equipment  - Apply yellow socks and bracelet for high fall risk patients  - Consider moving patient to room near nurses station  Outcome: Progressing     Problem: NEUROSENSORY - ADULT  Goal: Achieves stable or improved neurological status  Description: INTERVENTIONS  - Monitor and report changes in neurological status  - Monitor vital signs such as temperature, blood pressure, glucose, and any other labs ordered   - Initiate measures to prevent increased intracranial pressure  - Monitor for seizure activity and implement precautions if appropriate      Outcome: Progressing     Problem: CARDIOVASCULAR - ADULT  Goal: Maintains optimal cardiac output and hemodynamic stability  Description: INTERVENTIONS:  - Monitor I/O, vital signs and rhythm  - Monitor for S/S and trends of decreased cardiac output  - Administer and titrate ordered vasoactive medications to optimize hemodynamic stability  - Assess quality of pulses, skin color and temperature  - Assess for signs of decreased coronary artery perfusion  - Instruct patient to report change in severity of symptoms  Outcome: Progressing  Goal: Absence of cardiac dysrhythmias or at baseline rhythm  Description: INTERVENTIONS:  - Continuous cardiac monitoring, vital signs, obtain 12 lead EKG if ordered  - Administer antiarrhythmic and heart rate control medications as ordered  - Monitor electrolytes and administer replacement therapy as ordered  Outcome: Progressing     Problem: RESPIRATORY - ADULT  Goal: Achieves optimal ventilation and oxygenation  Description: INTERVENTIONS:  - Assess for changes in respiratory status  - Assess for changes in mentation and behavior  - Position to facilitate oxygenation and minimize respiratory effort  - Oxygen administered by appropriate delivery if ordered  - Initiate smoking cessation education as indicated  - Encourage broncho-pulmonary hygiene including cough, deep breathe, Incentive Spirometry  - Assess the need for suctioning and aspirate as needed  - Assess and instruct to report SOB or any respiratory difficulty  - Respiratory Therapy support as indicated  Outcome: Progressing     Problem: GASTROINTESTINAL - ADULT  Goal: Minimal or absence of nausea and/or vomiting  Description: INTERVENTIONS:  - Administer IV fluids if ordered to ensure adequate hydration  - Maintain NPO status until nausea and vomiting are resolved  - Nasogastric tube if ordered  - Administer ordered antiemetic medications as needed  - Provide nonpharmacologic comfort measures as appropriate  - Advance diet as tolerated, if ordered  - Consider nutrition services referral to assist patient with adequate nutrition and appropriate food choices  Outcome: Progressing  Goal: Maintains or returns to baseline bowel function  Description: INTERVENTIONS:  - Assess bowel function  - Encourage oral fluids to ensure adequate hydration  - Administer IV fluids if ordered to ensure adequate hydration  - Administer ordered medications as needed  - Encourage mobilization and activity  - Consider nutritional services referral to assist patient with adequate nutrition and appropriate food choices  Outcome: Progressing  Goal: Maintains adequate nutritional intake  Description: INTERVENTIONS:  - Monitor percentage of each meal consumed  - Identify factors contributing to decreased intake, treat as appropriate  - Assist with meals as needed  - Monitor I&O, weight, and lab values if indicated  - Obtain nutrition services referral as needed  Outcome: Progressing     Problem: GENITOURINARY - ADULT  Goal: Maintains or returns to baseline urinary function  Description: INTERVENTIONS:  - Assess urinary function  - Encourage oral fluids to ensure adequate hydration if ordered  - Administer IV fluids as ordered to ensure adequate hydration  - Administer ordered medications as needed  - Offer frequent toileting  - Follow urinary retention protocol if ordered  Outcome: Progressing     Problem: METABOLIC, FLUID AND ELECTROLYTES - ADULT  Goal: Electrolytes maintained within normal limits  Description: INTERVENTIONS:  - Monitor labs and assess patient for signs and symptoms of electrolyte imbalances  - Administer electrolyte replacement as ordered  - Monitor response to electrolyte replacements, including repeat lab results as appropriate  - Instruct patient on fluid and nutrition as appropriate  Outcome: Progressing  Goal: Fluid balance maintained  Description: INTERVENTIONS:  - Monitor labs   - Monitor I/O and WT  - Instruct patient on fluid and nutrition as appropriate  - Assess for signs & symptoms of volume excess or deficit  Outcome: Progressing     Problem: SKIN/TISSUE INTEGRITY - ADULT  Goal: Skin Integrity remains intact(Skin Breakdown Prevention)  Description: Assess:  -Perform Nate assessment every shift  -Clean and moisturize skin every day  -Inspect skin when repositioning, toileting, and assisting with ADLS  -Assess under medical devices such as masimo every shift  -Assess extremities for adequate circulation and sensation     Bed Management:  -Have minimal linens on bed & keep smooth, unwrinkled  -Change linens as needed when moist or perspiring  -Avoid sitting or lying in one position for more than 2 hours while in bed  -Keep HOB at 30 degrees     Toileting:  -Offer bedside commode  -Assess for incontinence every 4 hrs  -Use incontinent care products after each incontinent episode such as foam cleanser    Activity:  -Mobilize patient 3 times a day  -Encourage activity and walks on unit  -Encourage or provide ROM exercises   -Turn and reposition patient every 2 Hours  -Use appropriate equipment to lift or move patient in bed  -Instruct/ Assist with weight shifting every 1 hr when out of bed in chair  -Consider limitation of chair time 2 hour intervals    Skin Care:  -Avoid use of baby powder, tape, friction and shearing, hot water or constrictive clothing  -Relieve pressure over bony prominences using pillows  -Do not massage red bony areas    Outcome: Progressing  Goal: Incision(s), wounds(s) or drain site(s) healing without S/S of infection  Description: INTERVENTIONS  - Assess and document dressing, incision, wound bed, drain sites and surrounding tissue  - Provide patient and family education  - Perform skin care/dressing changes every day  Outcome: Progressing     Problem: HEMATOLOGIC - ADULT  Goal: Maintains hematologic stability  Description: INTERVENTIONS  - Assess for signs and symptoms of bleeding or hemorrhage  - Monitor labs  - Administer supportive blood products/factors as ordered and appropriate  Outcome: Progressing     Problem: MUSCULOSKELETAL - ADULT  Goal: Maintain or return mobility to safest level of function  Description: INTERVENTIONS:  - Assess patient's ability to carry out ADLs; assess patient's baseline for ADL function and identify physical deficits which impact ability to perform ADLs (bathing, care of mouth/teeth, toileting, grooming, dressing, etc )  - Assess/evaluate cause of self-care deficits   - Assess range of motion  - Assess patient's mobility  - Assess patient's need for assistive devices and provide as appropriate  - Encourage maximum independence but intervene and supervise when necessary  - Involve family in performance of ADLs  - Assess for home care needs following discharge   - Consider OT consult to assist with ADL evaluation and planning for discharge  - Provide patient education as appropriate  Outcome: Progressing     Problem: COPING  Goal: Pt/Family able to verbalize concerns and demonstrate effective coping strategies  Description: INTERVENTIONS:  - Assist patient/family to identify coping skills, available support systems and cultural and spiritual values  - Provide emotional support, including active listening and acknowledgement of concerns of patient and caregivers  - Reduce environmental stimuli, as able  - Provide patient education  - Assess for spiritual pain/suffering and initiate spiritual care, including notification of Pastoral Care or [place] based community as needed  - Assess effectiveness of coping strategies  Outcome: Progressing  Goal: Will report anxiety at manageable levels  Description: INTERVENTIONS:  - Administer medication as ordered  - Teach and encourage coping skills  - Provide emotional support  - Assess patient/family for anxiety and ability to cope  Outcome: Progressing

## 2022-08-28 NOTE — ASSESSMENT & PLAN NOTE
· Patient found to be COVID positive prior to discharge  · Weigh risks and benefits of mild COVID pathway  Consider starting if patient has hypoxia or increasing O2 requirement    · Repeat covid test as necessary for placement

## 2022-08-28 NOTE — ASSESSMENT & PLAN NOTE
8/21 CTA neck: "No vascular enhancement of left vertebral artery in the neck  However, in this patient with no acute/persistent neurologic symptoms, the appearance is most suggestive of anatomic variation rather than acute traumatic left vertebral artery injury, specifically as there is no enhancement even of left vertebral artery origin  There is enhancement of left vertebral artery and left posterior cerebellar artery above the level of foramen magnum presumably via collateral circulation across vertebral basilar junction  There are patent posterior communicating arteries bilaterally and the basilar artery is somewhat diminutive consistent with anatomic variation  "    - Neurosurgery consulted  - 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

## 2022-08-28 NOTE — OCCUPATIONAL THERAPY NOTE
Occupational Therapy Treatment Note     08/28/22 1257   OT Last Visit   OT Visit Date 08/28/22   Note Type   Note Type Treatment   Restrictions/Precautions   Weight Bearing Precautions Per Order No   Braces or Orthoses C/S Collar   Other Precautions Fall Risk;O2  (Covid 19 +)   Lifestyle   Autonomy I w/ ADLS/IADLS, transfers and functional mobility PTA   Reciprocal Relationships Pt lives w/ his sister and spouse   Service to Others on SSD; worked at a [employer]   Hellen Levi Enjoys playing on her Ipad   Pain Assessment   Pain Assessment Tool 0-10   Pain Score No Pain   ADL   Where Assessed Chair   Grooming Assistance 4  Minimal Assistance   Grooming Deficit Wash/dry hands   LB Dressing Assistance 4  Minimal Assistance   LB Dressing Deficit Don/doff R sock; Don/doff L sock; Thread RLE into pants; Thread LLE into pants;Pull up over hips   Toileting Assistance  4  Minimal Assistance   Toileting Deficit Clothing management up;Clothing management down   Bed Mobility   Supine to Sit 5  Supervision   Additional items Assist x 1   Transfers   Sit to Stand 5  Supervision   Additional items Assist x 1   Stand to Sit 4  Minimal assistance   Additional items Assist x 1   Functional Mobility   Functional Mobility 4  Minimal assistance   Additional items Rolling walker   Toilet Transfers   Toilet Transfer From Rolling walker   Toilet Transfer Type To and from   Toilet Transfer to Standard toilet   Toilet Transfer Technique Ambulating   Toilet Transfers Minimal assistance   Cognition   Overall Cognitive Status WFL   Arousal/Participation Cooperative   Attention Within functional limits   Orientation Level Oriented X4   Memory Decreased recall of precautions   Following Commands Follows multistep commands with increased time or repetition   Activity Tolerance   Activity Tolerance Patient tolerated treatment well   Assessment   Assessment Pt stated " I would like to go to the bathroom before we do any   Pt was seen for Occupational Therapy session with focus on activity tolerance, bed mob, functional transfers/mob, sitting balance and tolerance for pt engagement in UB/LB self-care tasks and energy conservation techniques  Pt cleared by MIMA/Berry for pt participated in OT session  Pt presented supine/HOB raised pt awake/alert and agreeable to participate in therapy following pt identifiers confirmed  Pt did not report her therapy goal this session  Pt required assist for 2* functional transfers/mob 2* pt increased activity tolerance  SHe was able to tolerate standing at bathroom sink for grooming task however required assist for LB dressing 2* pt decreased activity tolerance/endurance  Pt will require post acute rehab service to continue to address these above noted pt deficit which currently impair pt ADL and functional mob  Pt return to bedside chair post session, and all needs within reach     Plan   Treatment Interventions ADL retraining   Goal Expiration Date 09/05/22   OT Treatment Day 2   OT Frequency 3-5x/wk   Recommendation   OT Discharge Recommendation Post acute rehabilitation services   AM-PAC Daily Activity Inpatient   Lower Body Dressing 3   Bathing 3   Toileting 2   Upper Body Dressing 2   Grooming 2   Eating 3   Daily Activity Raw Score 15   Daily Activity Standardized Score (Calc for Raw Score >=11) 34 69   AM-PAC Applied Cognition Inpatient   Following a Speech/Presentation 3   Understanding Ordinary Conversation 4   Taking Medications 3   Remembering Where Things Are Placed or Put Away 4   Remembering List of 4-5 Errands 4   Taking Care of Complicated Tasks 3   Applied Cognition Raw Score 21   Applied Cognition Standardized Score 44 3       Matthew MANZO/KIRSTEN

## 2022-08-28 NOTE — ASSESSMENT & PLAN NOTE
· Likely 2/2 scalp laceration losses  · S/p 1u PRBC with appropriate response and stability  · Stable hemoglobin since admission  · Check Hgb if any change in hemodynamics or suspicion for blood loss

## 2022-08-28 NOTE — ASSESSMENT & PLAN NOTE
· Continue home carbidopa-levodopa-entacapone (STALEVO) -200 MG per tablet  · paxil  · Propranolol for tremor

## 2022-08-29 PROCEDURE — 94640 AIRWAY INHALATION TREATMENT: CPT

## 2022-08-29 PROCEDURE — 99232 SBSQ HOSP IP/OBS MODERATE 35: CPT | Performed by: SURGERY

## 2022-08-29 PROCEDURE — 94760 N-INVAS EAR/PLS OXIMETRY 1: CPT

## 2022-08-29 RX ADMIN — CARBIDOPA AND LEVODOPA 2 TABLET: 25; 100 TABLET ORAL at 13:51

## 2022-08-29 RX ADMIN — NICOTINE 14 MG: 14 PATCH, EXTENDED RELEASE TRANSDERMAL at 09:09

## 2022-08-29 RX ADMIN — APIXABAN 5 MG: 5 TABLET, FILM COATED ORAL at 18:47

## 2022-08-29 RX ADMIN — GABAPENTIN 300 MG: 300 CAPSULE ORAL at 21:59

## 2022-08-29 RX ADMIN — GABAPENTIN 300 MG: 300 CAPSULE ORAL at 18:47

## 2022-08-29 RX ADMIN — PAROXETINE HYDROCHLORIDE 20 MG: 20 TABLET, FILM COATED ORAL at 09:09

## 2022-08-29 RX ADMIN — SENNOSIDES AND DOCUSATE SODIUM 2 TABLET: 50; 8.6 TABLET ORAL at 09:09

## 2022-08-29 RX ADMIN — APIXABAN 5 MG: 5 TABLET, FILM COATED ORAL at 09:09

## 2022-08-29 RX ADMIN — BUDESONIDE 0.5 MG: 0.5 INHALANT ORAL at 08:39

## 2022-08-29 RX ADMIN — FORMOTEROL FUMARATE DIHYDRATE 20 MCG: 20 SOLUTION RESPIRATORY (INHALATION) at 08:39

## 2022-08-29 RX ADMIN — CARBIDOPA AND LEVODOPA 2 TABLET: 25; 100 TABLET ORAL at 21:59

## 2022-08-29 RX ADMIN — ASPIRIN 81 MG: 81 TABLET, COATED ORAL at 09:09

## 2022-08-29 RX ADMIN — CARBIDOPA AND LEVODOPA 2 TABLET: 25; 100 TABLET ORAL at 07:15

## 2022-08-29 RX ADMIN — CARBIDOPA AND LEVODOPA 2 TABLET: 25; 100 TABLET ORAL at 18:47

## 2022-08-29 RX ADMIN — ENTACAPONE 200 MG: 200 TABLET, FILM COATED ORAL at 21:59

## 2022-08-29 RX ADMIN — BUDESONIDE 0.5 MG: 0.5 INHALANT ORAL at 20:47

## 2022-08-29 RX ADMIN — LIDOCAINE 5% 1 PATCH: 700 PATCH TOPICAL at 09:09

## 2022-08-29 RX ADMIN — ENTACAPONE 200 MG: 200 TABLET, FILM COATED ORAL at 13:51

## 2022-08-29 RX ADMIN — PROPRANOLOL HYDROCHLORIDE 160 MG: 80 CAPSULE, EXTENDED RELEASE ORAL at 09:10

## 2022-08-29 RX ADMIN — ENTACAPONE 200 MG: 200 TABLET, FILM COATED ORAL at 18:47

## 2022-08-29 RX ADMIN — FORMOTEROL FUMARATE DIHYDRATE 20 MCG: 20 SOLUTION RESPIRATORY (INHALATION) at 20:47

## 2022-08-29 RX ADMIN — ENTACAPONE 200 MG: 200 TABLET, FILM COATED ORAL at 07:16

## 2022-08-29 RX ADMIN — GABAPENTIN 300 MG: 300 CAPSULE ORAL at 09:09

## 2022-08-29 NOTE — PROGRESS NOTES
[facility]  Progress Note Eric Wong 1960, 60 y o  female MRN: [id number]  Unit/Bed#: [facility] 627-01 Encounter: [id number]  Primary Care Provider: No primary care provider on file  Date and time admitted to hospital: 8/21/2022  9:49 AM    COVID-19  Assessment & Plan  · Patient found to be COVID positive prior to discharge  · Weigh risks and benefits of mild COVID pathway  Consider starting if patient has hypoxia or increasing O2 requirement  · Repeat covid test as necessary for placement    Acute blood loss anemia  Assessment & Plan  · Likely 2/2 scalp laceration losses  · S/p 1u PRBC with appropriate response and stability  · Stable hemoglobin since admission  · Check Hgb if any change in hemodynamics or suspicion for blood loss      MGUS (monoclonal gammopathy of unknown significance)  Assessment & Plan  · Per Epic review, MGUS w/ h/o recurrent DVTs on Eliquis/ASA      Pulmonary emphysema (HCC)  Assessment & Plan  · Arrived to [facility] on 3L supplemental O2 sating 100%  · PRN bronchodilators  · Q1hr I/S use  · 1PPD smoker  · Nicotine patch  · Goal SpO2 >88%  · Pulmonology consulted -- will need home oxygen evaluation and nebulizer machine for home    DVT (deep venous thrombosis) ([facility] [address] )  Assessment & Plan  · Continue home eliquis (resumed 8/22/22)    Fall  Assessment & Plan  · 8/21 mechanical fall d/t ambulatory dysfunction and Parkinson's disease  · No home assistive devices for ambulation  · PT/OT consulted      Scalp laceration, initial encounter  Assessment & Plan  · 11cm scalp laceration after fall  · S/p staple repair at OSH 8/21  · Staple removal 10-14 days (8/31-9/4)  · Outpatient follow up trauma clinic    Unspecified injury of left vertebral artery, initial encounter  Assessment & Plan  8/21 CTA neck: "No vascular enhancement of left vertebral artery in the neck    However, in this patient with no acute/persistent neurologic symptoms, the appearance is most suggestive of anatomic variation rather than acute traumatic left vertebral artery injury, specifically as there is no enhancement even of left vertebral artery origin  There is enhancement of left vertebral artery and left posterior cerebellar artery above the level of foramen magnum presumably via collateral circulation across vertebral basilar junction  There are patent posterior communicating arteries bilaterally and the basilar artery is somewhat diminutive consistent with anatomic variation  "    - Neurosurgery consulted  - 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      Nasal fracture  Assessment & Plan  · 8/21 CT facial bones: nasal bone fracture  · Unasyn for PPX  · S/p operative fixation by OMFS on 8/22/22  · Sinus precautions    C3 cervical fracture (HCC)  Assessment & Plan  As per C2 cervical fracture plan      Parkinson disease ([facility] [address] )  Assessment & Plan  · Continue home carbidopa-levodopa-entacapone (STALEVO) -200 MG per tablet  · paxil  · Propranolol for tremor    * C2 cervical fracture (HCC)  Assessment & Plan  · 8/21 CT cspine: anterioinferior C2 fx, L transverse process fx C3, old L clavicle fx  · Neurosurgery consulted  · Appreciate recommendations  · Cervical spine precautions  · Non-op in cervical collar, upright imaging        SUBJECTIVE:    Subjective: No acute events overnight  SpO2 in low 90s on 1L NC  Denies SOB and pain is well controlled  OBJECTIVE:   Vitals:   Temp:  [97 9 °F (36 6 °C)-99 4 °F (37 4 °C)] 99 4 °F (37 4 °C)  HR:  [76-83] 83  Resp:  [16-20] 20  BP: (119-147)/(76-80) 119/76    Intake/Output:  I/O       08/27 0701 08/28 0700 08/28 0701 08/29 0700 08/29 0701 08/30 0700    P  O         Total Intake(mL/kg)       Urine (mL/kg/hr) 500 (0 5)      Total Output 500      Net -500             Unmeasured Urine Occurrence  3 x     Unmeasured Stool Occurrence  1 x          Nutrition: Diet Dysphagia/Modified Consistency; Dysphagia 3-Dental Soft; Thin Liquid  GI Prophylaxis/Bowel Regimen: zofran, senokot  VTE Prophylaxis: eliquis    Physical Exam:  General: No acute distress  Neuro: alert and oriented  HEENT: nasal cannula in place  CV: Well perfused, regular rate and rhythm  Lungs: Normal work of breathing, no increased respiratory effort on supplemental oxygen  Abdomen: Soft, non-tender, non-distended  Extremities: No edema, clubbing or cyanosis  Skin: Warm, dry    Invasive Devices  Report    Peripheral Intravenous Line  Duration           Peripheral IV 08/28/22 Right Antecubital 1 day          Drain  Duration           External Urinary Catheter -- days                      Lab Results: Results: I have personally reviewed all pertinent laboratory/tests results  Imaging/EKG Studies: I have personally reviewed pertinent reports

## 2022-08-29 NOTE — CASE MANAGEMENT
Case Management Discharge Planning Note    Patient name Mariella Gomez  Location [address] 627/PPHP 829-71 MRN [id number]  : 1960 Date 2022       Current Admission Date: 2022  Current Admission Diagnosis:C2 cervical fracture [facility])   Patient Active Problem List    Diagnosis Date Noted    COVID-19 2022    Acute blood loss anemia 2022    Parkinson disease ([facility] [address] ) 2022    [facility]    [facility] 20[facility]    Nasal fracture 2022    Unspecified injury of left vertebral artery, initial encounter 2022    Scalp laceration, initial encounter 2022    Fall 2022    DVT (deep venous thrombosis) ([facility] [address] ) 2022    Pulmonary emphysema (HCC)     MGUS (monoclonal gammopathy of unknown significance)       LOS (days): 8  Geometric Mean LOS (GMLOS) (days):   Days to GMLOS:     OBJECTIVE:  Risk of Unplanned Readmission Score: 10 9         Current admission status: Inpatient   Preferred Pharmacy:   [facility] [address] [place] [address]  Phone: [phone] Fax: [phone]    Primary Care Provider: No primary care provider on file  Primary Insurance: [employer]  Secondary Insurance:     DISCHARGE DETAILS:          CM spoke to pt's  regarding d/c plan  Pt has expressed her desire to d/c home with VNA, and not go to IP rehab  Pt's  is in agreement with this plan and wants to take the pt home today  CM explained that a home O2 evaluation needs to occur, as well as getting [address] secured for her  Pt's  drove to the hospital in person to ask CM to take her home  CM spoke to  and states that tomorrow can be possibly more appropriate for d/c  CM placed multiple referrals for VNA, and sent for a walker

## 2022-08-29 NOTE — CASE MANAGEMENT
Case Management Discharge Planning Note    Patient name Mariella Gomez  Location [address] 627/PPHP 734-88 MRN [id number]  : 1960 Date 2022       Current Admission Date: 2022  Current Admission Diagnosis:C2 cervical fracture [facility])   Patient Active Problem List    Diagnosis Date Noted    COVID-19 2022    Acute blood loss anemia 2022    Parkinson disease ([facility] [address] ) 2022    [facility]    [facility] 2022    Nasal fracture 2022    Unspecified injury of left vertebral artery, initial encounter 2022    Scalp laceration, initial encounter 2022    Fall 2022    DVT (deep venous thrombosis) ([facility] [address] ) 2022    Pulmonary emphysema (HCC)     MGUS (monoclonal gammopathy of unknown significance)       LOS (days): 8  Geometric Mean LOS (GMLOS) (days):   Days to GMLOS:     OBJECTIVE:  Risk of Unplanned Readmission Score: 10 87         Current admission status: Inpatient   Preferred Pharmacy:   [facility] [address] [place] [address] [place] [address]  Phone: [phone] Fax: [phone]    Primary Care Provider: No primary care provider on file      Primary Insurance: [employer]  Secondary Insurance:     DISCHARGE DETAILS:    BRIANNA left voicemail liaison from [facility] to see if they can accept

## 2022-08-29 NOTE — PLAN OF CARE
Problem: MOBILITY - ADULT  Goal: Maintain or return to baseline ADL function  Description: INTERVENTIONS:  -  Assess patient's ability to carry out ADLs; assess patient's baseline for ADL function and identify physical deficits which impact ability to perform ADLs (bathing, care of mouth/teeth, toileting, grooming, dressing, etc )  - Assess/evaluate cause of self-care deficits   - Assess range of motion  - Assess patient's mobility; develop plan if impaired  - Assess patient's need for assistive devices and provide as appropriate  - Encourage maximum independence but intervene and supervise when necessary  - Involve family in performance of ADLs  - Assess for home care needs following discharge   - Consider OT consult to assist with ADL evaluation and planning for discharge  - Provide patient education as appropriate  Outcome: Progressing  Goal: Maintains/Returns to pre admission functional level  Description: INTERVENTIONS:  - Perform BMAT or MOVE assessment daily    - Set and communicate daily mobility goal to care team and patient/family/caregiver  - Collaborate with rehabilitation services on mobility goals if consulted  - Perform Range of Motion 3 times a day  - Reposition patient every 2 hours    - Dangle patient 3 times a day  - Stand patient 3 times a day  - Ambulate patient 3 times a day  - Out of bed to chair 3 times a day   - Out of bed for meals 3 times a day  - Out of bed for toileting  - Record patient progress and toleration of activity level   Outcome: Progressing     Problem: PAIN - ADULT  Goal: Verbalizes/displays adequate comfort level or baseline comfort level  Description: Interventions:  - Encourage patient to monitor pain and request assistance  - Assess pain using appropriate pain scale  - Administer analgesics based on type and severity of pain and evaluate response  - Implement non-pharmacological measures as appropriate and evaluate response  - Consider cultural and social influences on pain and pain management  - Notify physician/advanced practitioner if interventions unsuccessful or patient reports new pain  Outcome: Progressing     Problem: INFECTION - ADULT  Goal: Absence or prevention of progression during hospitalization  Description: INTERVENTIONS:  - Assess and monitor for signs and symptoms of infection  - Monitor lab/diagnostic results  - Monitor all insertion sites, i e  indwelling lines, tubes, and drains  - Monitor endotracheal if appropriate and nasal secretions for changes in amount and color  - Peterson appropriate cooling/warming therapies per order  - Administer medications as ordered  - Instruct and encourage patient and family to use good hand hygiene technique  - Identify and instruct in appropriate isolation precautions for identified infection/condition  Outcome: Progressing     Problem: SAFETY ADULT  Goal: Maintain or return to baseline ADL function  Description: INTERVENTIONS:  -  Assess patient's ability to carry out ADLs; assess patient's baseline for ADL function and identify physical deficits which impact ability to perform ADLs (bathing, care of mouth/teeth, toileting, grooming, dressing, etc )  - Assess/evaluate cause of self-care deficits   - Assess range of motion  - Assess patient's mobility; develop plan if impaired  - Assess patient's need for assistive devices and provide as appropriate  - Encourage maximum independence but intervene and supervise when necessary  - Involve family in performance of ADLs  - Assess for home care needs following discharge   - Consider OT consult to assist with ADL evaluation and planning for discharge  - Provide patient education as appropriate  Outcome: Progressing  Goal: Maintains/Returns to pre admission functional level  Description: INTERVENTIONS:  - Perform BMAT or MOVE assessment daily    - Set and communicate daily mobility goal to care team and patient/family/caregiver     - Collaborate with rehabilitation services on mobility goals if consulted  - Perform Range of Motion 3 times a day  - Reposition patient every 2 hours    - Dangle patient 3 times a day  - Stand patient 3 times a day  - Ambulate patient 3 times a day  - Out of bed to chair 3 times a day   - Out of bed for meals 3 times a day  - Out of bed for toileting  - Record patient progress and toleration of activity level   Outcome: Progressing  Goal: Patient will remain free of falls  Description: INTERVENTIONS:  - Educate patient/family on patient safety including physical limitations  - Instruct patient to call for assistance with activity   - Consult OT/PT to assist with strengthening/mobility   - Keep Call bell within reach  - Keep bed low and locked with side rails adjusted as appropriate  - Keep care items and personal belongings within reach  - Initiate and maintain comfort rounds  - Make Fall Risk Sign visible to staff  - Offer Toileting every 1 Hours, in advance of need  - Initiate/Maintain alarm  - Obtain necessary fall risk management equipment  - Apply yellow socks and bracelet for high fall risk patients  - Consider moving patient to room near nurses station  Outcome: Progressing     Problem: DISCHARGE PLANNING  Goal: Discharge to home or other facility with appropriate resources  Description: INTERVENTIONS:  - Identify barriers to discharge w/patient and caregiver  - Arrange for needed discharge resources and transportation as appropriate  - Identify discharge learning needs (meds, wound care, etc )  - Arrange for interpretive services to assist at discharge as needed  - Refer to Case Management Department for coordinating discharge planning if the patient needs post-hospital services based on physician/advanced practitioner order or complex needs related to functional status, cognitive ability, or social support system  Outcome: Progressing     Problem: Knowledge Deficit  Goal: Patient/family/caregiver demonstrates understanding of disease process, treatment plan, medications, and discharge instructions  Description: Complete learning assessment and assess knowledge base  Interventions:  - Provide teaching at level of understanding  - Provide teaching via preferred learning methods  Outcome: Progressing     Problem: Nutrition/Hydration-ADULT  Goal: Nutrient/Hydration intake appropriate for improving, restoring or maintaining nutritional needs  Description: Monitor and assess patient's nutrition/hydration status for malnutrition  Collaborate with interdisciplinary team and initiate plan and interventions as ordered  Monitor patient's weight and dietary intake as ordered or per policy  Utilize nutrition screening tool and intervene as necessary  Determine patient's food preferences and provide high-protein, high-caloric foods as appropriate       INTERVENTIONS:  - Monitor oral intake, urinary output, labs, and treatment plans  - Assess nutrition and hydration status and recommend course of action  - Evaluate amount of meals eaten  - Assist patient with eating if necessary   - Allow adequate time for meals  - Recommend/ encourage appropriate diets, oral nutritional supplements, and vitamin/mineral supplements  - Order, calculate, and assess calorie counts as needed  - Recommend, monitor, and adjust tube feedings and TPN/PPN based on assessed needs  - Assess need for intravenous fluids  - Provide specific nutrition/hydration education as appropriate  - Include patient/family/caregiver in decisions related to nutrition  Outcome: Progressing     Problem: Prexisting or High Potential for Compromised Skin Integrity  Goal: Skin integrity is maintained or improved  Description: INTERVENTIONS:  - Identify patients at risk for skin breakdown  - Assess and monitor skin integrity  - Assess and monitor nutrition and hydration status  - Monitor labs   - Assess for incontinence   - Turn and reposition patient  - Assist with mobility/ambulation  - Relieve pressure over bony prominences  - Avoid friction and shearing  - Provide appropriate hygiene as needed including keeping skin clean and dry  - Evaluate need for skin moisturizer/barrier cream  - Collaborate with interdisciplinary team   - Patient/family teaching  - Consider wound care consult   Outcome: Progressing     Problem: Potential for Falls  Goal: Patient will remain free of falls  Description: INTERVENTIONS:  - Educate patient/family on patient safety including physical limitations  - Instruct patient to call for assistance with activity   - Consult OT/PT to assist with strengthening/mobility   - Keep Call bell within reach  - Keep bed low and locked with side rails adjusted as appropriate  - Keep care items and personal belongings within reach  - Initiate and maintain comfort rounds  - Make Fall Risk Sign visible to staff  - Offer Toileting every 1 Hours, in advance of need  - Initiate/Maintain alarm  - Obtain necessary fall risk management equipment  - Apply yellow socks and bracelet for high fall risk patients  - Consider moving patient to room near nurses station  Outcome: Progressing     Problem: NEUROSENSORY - ADULT  Goal: Achieves stable or improved neurological status  Description: INTERVENTIONS  - Monitor and report changes in neurological status  - Monitor vital signs such as temperature, blood pressure, glucose, and any other labs ordered   - Initiate measures to prevent increased intracranial pressure  - Monitor for seizure activity and implement precautions if appropriate      Outcome: Progressing     Problem: CARDIOVASCULAR - ADULT  Goal: Maintains optimal cardiac output and hemodynamic stability  Description: INTERVENTIONS:  - Monitor I/O, vital signs and rhythm  - Monitor for S/S and trends of decreased cardiac output  - Administer and titrate ordered vasoactive medications to optimize hemodynamic stability  - Assess quality of pulses, skin color and temperature  - Assess for signs of decreased coronary artery perfusion  - Instruct patient to report change in severity of symptoms  Outcome: Progressing  Goal: Absence of cardiac dysrhythmias or at baseline rhythm  Description: INTERVENTIONS:  - Continuous cardiac monitoring, vital signs, obtain 12 lead EKG if ordered  - Administer antiarrhythmic and heart rate control medications as ordered  - Monitor electrolytes and administer replacement therapy as ordered  Outcome: Progressing     Problem: RESPIRATORY - ADULT  Goal: Achieves optimal ventilation and oxygenation  Description: INTERVENTIONS:  - Assess for changes in respiratory status  - Assess for changes in mentation and behavior  - Position to facilitate oxygenation and minimize respiratory effort  - Oxygen administered by appropriate delivery if ordered  - Initiate smoking cessation education as indicated  - Encourage broncho-pulmonary hygiene including cough, deep breathe, Incentive Spirometry  - Assess the need for suctioning and aspirate as needed  - Assess and instruct to report SOB or any respiratory difficulty  - Respiratory Therapy support as indicated  Outcome: Progressing     Problem: GASTROINTESTINAL - ADULT  Goal: Minimal or absence of nausea and/or vomiting  Description: INTERVENTIONS:  - Administer IV fluids if ordered to ensure adequate hydration  - Maintain NPO status until nausea and vomiting are resolved  - Nasogastric tube if ordered  - Administer ordered antiemetic medications as needed  - Provide nonpharmacologic comfort measures as appropriate  - Advance diet as tolerated, if ordered  - Consider nutrition services referral to assist patient with adequate nutrition and appropriate food choices  Outcome: Progressing  Goal: Maintains or returns to baseline bowel function  Description: INTERVENTIONS:  - Assess bowel function  - Encourage oral fluids to ensure adequate hydration  - Administer IV fluids if ordered to ensure adequate hydration  - Administer ordered medications as needed  - Encourage mobilization and activity  - Consider nutritional services referral to assist patient with adequate nutrition and appropriate food choices  Outcome: Progressing  Goal: Maintains adequate nutritional intake  Description: INTERVENTIONS:  - Monitor percentage of each meal consumed  - Identify factors contributing to decreased intake, treat as appropriate  - Assist with meals as needed  - Monitor I&O, weight, and lab values if indicated  - Obtain nutrition services referral as needed  Outcome: Progressing     Problem: GENITOURINARY - ADULT  Goal: Maintains or returns to baseline urinary function  Description: INTERVENTIONS:  - Assess urinary function  - Encourage oral fluids to ensure adequate hydration if ordered  - Administer IV fluids as ordered to ensure adequate hydration  - Administer ordered medications as needed  - Offer frequent toileting  - Follow urinary retention protocol if ordered  Outcome: Progressing     Problem: METABOLIC, FLUID AND ELECTROLYTES - ADULT  Goal: Electrolytes maintained within normal limits  Description: INTERVENTIONS:  - Monitor labs and assess patient for signs and symptoms of electrolyte imbalances  - Administer electrolyte replacement as ordered  - Monitor response to electrolyte replacements, including repeat lab results as appropriate  - Instruct patient on fluid and nutrition as appropriate  Outcome: Progressing  Goal: Fluid balance maintained  Description: INTERVENTIONS:  - Monitor labs   - Monitor I/O and WT  - Instruct patient on fluid and nutrition as appropriate  - Assess for signs & symptoms of volume excess or deficit  Outcome: Progressing     Problem: SKIN/TISSUE INTEGRITY - ADULT  Goal: Skin Integrity remains intact(Skin Breakdown Prevention)  Description: Assess:  -Perform Nate assessment every shift  -Clean and moisturize skin every day  -Inspect skin when repositioning, toileting, and assisting with ADLS  -Assess under medical devices such as masimo every shift  -Assess extremities for adequate circulation and sensation     Bed Management:  -Have minimal linens on bed & keep smooth, unwrinkled  -Change linens as needed when moist or perspiring  -Avoid sitting or lying in one position for more than 2 hours while in bed  -Keep HOB at 30 degrees     Toileting:  -Offer bedside commode  -Assess for incontinence every 4 hrs  -Use incontinent care products after each incontinent episode such as foam cleanser    Activity:  -Mobilize patient 3 times a day  -Encourage activity and walks on unit  -Encourage or provide ROM exercises   -Turn and reposition patient every 2 Hours  -Use appropriate equipment to lift or move patient in bed  -Instruct/ Assist with weight shifting every 1 hr when out of bed in chair  -Consider limitation of chair time 2 hour intervals    Skin Care:  -Avoid use of baby powder, tape, friction and shearing, hot water or constrictive clothing  -Relieve pressure over bony prominences using pillows  -Do not massage red bony areas    Outcome: Progressing  Goal: Incision(s), wounds(s) or drain site(s) healing without S/S of infection  Description: INTERVENTIONS  - Assess and document dressing, incision, wound bed, drain sites and surrounding tissue  - Provide patient and family education  - Perform skin care/dressing changes every day  Outcome: Progressing     Problem: HEMATOLOGIC - ADULT  Goal: Maintains hematologic stability  Description: INTERVENTIONS  - Assess for signs and symptoms of bleeding or hemorrhage  - Monitor labs  - Administer supportive blood products/factors as ordered and appropriate  Outcome: Progressing     Problem: MUSCULOSKELETAL - ADULT  Goal: Maintain or return mobility to safest level of function  Description: INTERVENTIONS:  - Assess patient's ability to carry out ADLs; assess patient's baseline for ADL function and identify physical deficits which impact ability to perform ADLs (bathing, care of mouth/teeth, toileting, grooming, dressing, etc )  - Assess/evaluate cause of self-care deficits   - Assess range of motion  - Assess patient's mobility  - Assess patient's need for assistive devices and provide as appropriate  - Encourage maximum independence but intervene and supervise when necessary  - Involve family in performance of ADLs  - Assess for home care needs following discharge   - Consider OT consult to assist with ADL evaluation and planning for discharge  - Provide patient education as appropriate  Outcome: Progressing     Problem: COPING  Goal: Pt/Family able to verbalize concerns and demonstrate effective coping strategies  Description: INTERVENTIONS:  - Assist patient/family to identify coping skills, available support systems and cultural and spiritual values  - Provide emotional support, including active listening and acknowledgement of concerns of patient and caregivers  - Reduce environmental stimuli, as able  - Provide patient education  - Assess for spiritual pain/suffering and initiate spiritual care, including notification of Pastoral Care or [place] based community as needed  - Assess effectiveness of coping strategies  Outcome: Progressing  Goal: Will report anxiety at manageable levels  Description: INTERVENTIONS:  - Administer medication as ordered  - Teach and encourage coping skills  - Provide emotional support  - Assess patient/family for anxiety and ability to cope  Outcome: Progressing

## 2022-08-30 VITALS
TEMPERATURE: 98 F | RESPIRATION RATE: 18 BRPM | BODY MASS INDEX: 19.44 KG/M2 | WEIGHT: 102.95 LBS | HEIGHT: 61 IN | SYSTOLIC BLOOD PRESSURE: 117 MMHG | HEART RATE: 83 BPM | DIASTOLIC BLOOD PRESSURE: 60 MMHG | OXYGEN SATURATION: 96 %

## 2022-08-30 LAB
DME PARACHUTE DELIVERY DATE EXPECTED: NORMAL
DME PARACHUTE DELIVERY DATE REQUESTED: NORMAL
DME PARACHUTE DELIVERY DATE REQUESTED: NORMAL
DME PARACHUTE ITEM DESCRIPTION: NORMAL
DME PARACHUTE ORDER STATUS: NORMAL
DME PARACHUTE ORDER STATUS: NORMAL
DME PARACHUTE SUPPLIER NAME: NORMAL
DME PARACHUTE SUPPLIER NAME: NORMAL
DME PARACHUTE SUPPLIER PHONE: NORMAL
DME PARACHUTE SUPPLIER PHONE: NORMAL

## 2022-08-30 PROCEDURE — 94640 AIRWAY INHALATION TREATMENT: CPT

## 2022-08-30 PROCEDURE — 97530 THERAPEUTIC ACTIVITIES: CPT

## 2022-08-30 PROCEDURE — 97116 GAIT TRAINING THERAPY: CPT

## 2022-08-30 PROCEDURE — 99232 SBSQ HOSP IP/OBS MODERATE 35: CPT | Performed by: SURGERY

## 2022-08-30 PROCEDURE — NC001 PR NO CHARGE: Performed by: SURGERY

## 2022-08-30 PROCEDURE — 99232 SBSQ HOSP IP/OBS MODERATE 35: CPT | Performed by: INTERNAL MEDICINE

## 2022-08-30 PROCEDURE — 94760 N-INVAS EAR/PLS OXIMETRY 1: CPT

## 2022-08-30 RX ORDER — NICOTINE 21 MG/24HR
1 PATCH, TRANSDERMAL 24 HOURS TRANSDERMAL DAILY
Qty: 90 EACH | Refills: 0 | Status: SHIPPED | OUTPATIENT
Start: 2022-08-31

## 2022-08-30 RX ORDER — PREDNISONE 20 MG/1
40 TABLET ORAL DAILY
Qty: 14 TABLET | Refills: 0 | Status: SHIPPED | OUTPATIENT
Start: 2022-08-30 | End: 2022-09-06

## 2022-08-30 RX ORDER — LIDOCAINE 50 MG/G
1 PATCH TOPICAL DAILY
Qty: 5 PATCH | Refills: 0 | Status: SHIPPED | OUTPATIENT
Start: 2022-08-31 | End: 2022-10-04 | Stop reason: ALTCHOICE

## 2022-08-30 RX ORDER — AMOXICILLIN 250 MG
2 CAPSULE ORAL 2 TIMES DAILY
Qty: 120 TABLET | Refills: 0 | Status: SHIPPED | OUTPATIENT
Start: 2022-08-30 | End: 2022-10-04 | Stop reason: ALTCHOICE

## 2022-08-30 RX ADMIN — LIDOCAINE 5% 1 PATCH: 700 PATCH TOPICAL at 08:38

## 2022-08-30 RX ADMIN — ASPIRIN 81 MG: 81 TABLET, COATED ORAL at 08:37

## 2022-08-30 RX ADMIN — APIXABAN 5 MG: 5 TABLET, FILM COATED ORAL at 08:37

## 2022-08-30 RX ADMIN — BUDESONIDE 0.5 MG: 0.5 INHALANT ORAL at 07:49

## 2022-08-30 RX ADMIN — ENTACAPONE 200 MG: 200 TABLET, FILM COATED ORAL at 08:38

## 2022-08-30 RX ADMIN — PAROXETINE HYDROCHLORIDE 20 MG: 20 TABLET, FILM COATED ORAL at 08:38

## 2022-08-30 RX ADMIN — SENNOSIDES AND DOCUSATE SODIUM 2 TABLET: 50; 8.6 TABLET ORAL at 08:38

## 2022-08-30 RX ADMIN — ENTACAPONE 200 MG: 200 TABLET, FILM COATED ORAL at 12:24

## 2022-08-30 RX ADMIN — GABAPENTIN 300 MG: 300 CAPSULE ORAL at 08:37

## 2022-08-30 RX ADMIN — CARBIDOPA AND LEVODOPA 2 TABLET: 25; 100 TABLET ORAL at 08:37

## 2022-08-30 RX ADMIN — ACETAMINOPHEN 975 MG: 325 TABLET ORAL at 14:31

## 2022-08-30 RX ADMIN — CARBIDOPA AND LEVODOPA 2 TABLET: 25; 100 TABLET ORAL at 12:24

## 2022-08-30 RX ADMIN — NICOTINE 14 MG: 14 PATCH, EXTENDED RELEASE TRANSDERMAL at 08:39

## 2022-08-30 RX ADMIN — FORMOTEROL FUMARATE DIHYDRATE 20 MCG: 20 SOLUTION RESPIRATORY (INHALATION) at 07:49

## 2022-08-30 NOTE — ASSESSMENT & PLAN NOTE
· Patient found to be COVID positive prior to discharge  · Weigh risks and benefits of mild COVID pathway  Consider starting if patient has hypoxia or increasing O2 requirement

## 2022-08-30 NOTE — ASSESSMENT & PLAN NOTE
· 8/21 mechanical fall d/t ambulatory dysfunction and Parkinson's disease  · No home assistive devices for ambulation  · PT/OT consulted, recommending post acute rehab

## 2022-08-30 NOTE — UTILIZATION REVIEW
Continued Stay Review    Date: 8/30                          Current Patient Class: Inpatient  Current Level of Care: Med Surg    HPI:62 y o  female initially admitted on 8/21    Assessment/Plan:   Currently with recorded desaturations to <80% while ambulating  Ambulatory pulse ox in preparation for home O2  need 2 L supplemental oxygen at all times for now  Acute hypoxic respiratory failure likely secondary to underlying COPD/emphysema and concomitant COVID  Recommend discharge with 7 day course of prednisone  PT recommending post acute rehab  Per CM notes; Referral for Home O2 placed    Tentative discharge for today 8/30    Vital Signs:   08/30/22 1314 -- -- -- -- -- -- 28 -- -- -- -- --   08/30/22 1225 98 5 °F (36 9 °C) 73 19 92/49 Abnormal   -- 87 %   Abnormal   -- -- -- -- None (Room air) --   BP: lubna awad aware at 08/30/22 1225   SpO2: 87-92 at 08/30/22 1225   08/30/22 1224 -- -- -- -- -- 97 % -- -- -- -- Nasal cannula --   08/30/22 0910 -- -- -- 102/58 -- -- -- -- -- -- -- Sitting   08/30/22 0900 99 1 °F (37 3 °C) 87 18 -- -- 95 %  -- -- 2 L/min -- Nasal cannula        Pertinent Labs/Diagnostic Results:   Results from last 7 days   Lab Units 08/25/22  1507   SARS-COV-2  Positive*     Results from last 7 days   Lab Units 08/24/22  0451   WBC Thousand/uL 5 64   HEMOGLOBIN g/dL 9 7*   HEMATOCRIT % 31 6*   PLATELETS Thousands/uL 172         Results from last 7 days   Lab Units 08/24/22  0451   SODIUM mmol/L 138   POTASSIUM mmol/L 3 8   CHLORIDE mmol/L 104   CO2 mmol/L 31   ANION GAP mmol/L 3*   BUN mg/dL 10   CREATININE mg/dL 0 72   EGFR ml/min/1 73sq m 90   CALCIUM mg/dL 8 6     Results from last 7 days   Lab Units 08/24/22  0451   AST U/L 48*   ALT U/L 16   ALK PHOS U/L 162*   TOTAL PROTEIN g/dL 5 9*   ALBUMIN g/dL 2 4*   TOTAL BILIRUBIN mg/dL 0 69         Results from last 7 days   Lab Units 08/24/22  0451   GLUCOSE RANDOM mg/dL 108         Results from last 7 days   Lab Units 08/25/22  1507   INFLUENZA A PCR  Negative   INFLUENZA B PCR  Negative   RSV PCR  Negative       Medications:   Scheduled Medications:  acetaminophen, 975 mg, Oral, Q8H NENA  apixaban, 5 mg, Oral, BID  aspirin, 81 mg, Oral, Daily  budesonide, 0 5 mg, Nebulization, Q12H  carbidopa-levodopa, 2 tablet, Oral, 4x Daily   And  entacapone, 200 mg, Oral, 4x Daily  formoterol, 20 mcg, Nebulization, Q12H  gabapentin, 300 mg, Oral, TID  lidocaine, 1 patch, Topical, Daily  nicotine, 14 mg, Transdermal, Daily  PARoxetine, 20 mg, Oral, Daily  propranolol, 160 mg, Oral, Daily  senna-docusate sodium, 2 tablet, Oral, BID      Continuous IV Infusions: None     PRN Meds:  albuterol, 2 5 mg, Nebulization, Q6H PRN  morphine injection, 2 mg, Intravenous, Q3H PRN  ondansetron, 4 mg, Intravenous, Q4H PRN  oxyCODONE, 2 5 mg, Oral, Q4H PRN  oxyCODONE, 5 mg, Oral, Q4H PRN        Discharge Plan: See above    Network Utilization Review Department  ATTENTION: Please call with any questions or concerns to [phone] and carefully listen to the prompts so that you are directed to the right person  All voicemails are confidential   Cinthya Cardenask all requests for admission clinical reviews, approved or denied determinations and any other requests to dedicated fax number below belonging to the campus where the patient is receiving treatment   List of dedicated fax numbers for the Facilities:  [address] [place] [address] DENIALS (Administrative/Medical Necessity) [phone]   [address] [place] [address] (Maternity/NICU/Pediatrics) 261 [facility],[address]  [address] Medical [place] [address] [id number] [address] [facility] Mike Zeng 1481 [address] [place] [id number] [phone]

## 2022-08-30 NOTE — CASE MANAGEMENT
Case Management Discharge Planning Note    Patient name Jackie Preston  Location [facility] 627/PPHP [id number] MRN [id number]  : 1960 Date 2022       Current Admission Date: 2022  Current Admission Diagnosis:C2 cervical fracture [facility])   Patient Active Problem List    Diagnosis Date Noted    COVID-19 2022    Acute blood loss anemia 2022    Parkinson disease ([facility] [facility] ) 2022    C2 cervical fracture ([facility] ) 2022    C3 cervical fracture ([facility] [facility] ) 2022    Nasal fracture 2022    Unspecified injury of left vertebral artery, initial encounter 2022    Scalp laceration, initial encounter 2022    Fall 2022    DVT (deep venous thrombosis) ([facility] ) 2022    Pulmonary emphysema (HCC)     MGUS (monoclonal gammopathy of unknown significance)       LOS (days): 9  Geometric Mean LOS (GMLOS) (days):   Days to GMLOS:     OBJECTIVE:  Risk of Unplanned Readmission Score: 11 07         Current admission status: Inpatient   Preferred Pharmacy:   [address] [place] [address]  Phone: [phone] Fax: [phone]    Primary Care Provider: No primary care provider on file  Primary Insurance: Stefani  Secondary Insurance:     DISCHARGE DETAILS:    CM placed referral for Home O2  Plan to have O2 delivered to room as soon as possible and then pt's  will transport home

## 2022-08-30 NOTE — ASSESSMENT & PLAN NOTE
· 8/21 CT cspine: anterioinferior C2 fx, L transverse process fx C3, old L clavicle fx  · Neurosurgery consulted  · Appreciate recommendations  · Cervical spine precautions  · Cont vista collar  · Cont ASA

## 2022-08-30 NOTE — PROGRESS NOTES
[facility] [facility]  Progress Note Seun Stains 1960, 60 y o  female MRN: [id number]  Unit/Bed#: [facility] 627-01 Encounter: [id number]  Primary Care Provider: No primary care provider on file  Date and time admitted to hospital: 8/21/2022  9:49 AM    COVID-19  Assessment & Plan  · Patient found to be COVID positive prior to discharge  · Weigh risks and benefits of mild COVID pathway  Consider starting if patient has hypoxia or increasing O2 requirement        Acute blood loss anemia  Assessment & Plan  · Likely 2/2 scalp laceration losses  · S/p 1u PRBC with appropriate response and stability  · Stable hemoglobin since admission  · Check Hgb if any change in hemodynamics or suspicion for blood loss      MGUS (monoclonal gammopathy of unknown significance)  Assessment & Plan  · Per Epic review, MGUS w/ h/o recurrent DVTs on Eliquis/ASA      Pulmonary emphysema (HCC)  Assessment & Plan  · Arrived to B on 3L supplemental O2 sating 100%  · PRN bronchodilators  · Q1hr I/S use  · 1PPD smoker  · Nicotine patch  · Goal SpO2 >88%, currently with recorded desaturations to <80% while ambulating  · Pulmonology consulted -- will need home oxygen evaluation and nebulizer machine for home  · Ambulatory pulse ox in preparation for home oxygen    DVT (deep venous thrombosis) ([facility] [address] )  Assessment & Plan  · Continue home eliquis (resumed 8/22/22)    Fall  Assessment & Plan  · 8/21 mechanical fall d/t ambulatory dysfunction and Parkinson's disease  · No home assistive devices for ambulation  · PT/OT consulted, recommending post acute rehab      Scalp laceration, initial encounter  Assessment & Plan  · 11cm scalp laceration after fall  · S/p staple repair at OSH 8/21  · Staple removal 10-14 days (8/31-9/4)  · Outpatient follow up trauma clinic    Unspecified injury of left vertebral artery, initial encounter  Assessment & Plan  8/21 CTA neck: "No vascular enhancement of left vertebral artery in the neck  However, in this patient with no acute/persistent neurologic symptoms, the appearance is most suggestive of anatomic variation rather than acute traumatic left vertebral artery injury, specifically as there is no enhancement even of left vertebral artery origin  There is enhancement of left vertebral artery and left posterior cerebellar artery above the level of foramen magnum presumably via collateral circulation across vertebral basilar junction  There are patent posterior communicating arteries bilaterally and the basilar artery is somewhat diminutive consistent with anatomic variation  "    - Neurosurgery consulted  - 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      Nasal fracture  Assessment & Plan  · 8/21 CT facial bones: nasal bone fracture  · Unasyn for PPX EOT 8/26  · S/p operative fixation by OMFS on 8/22/22  · Sinus precautions    C3 cervical fracture (HCC)  Assessment & Plan  As per C2 cervical fracture plan      Parkinson disease ([facility] [address] )  Assessment & Plan  · Continue home carbidopa-levodopa-entacapone (STALEVO) -200 MG per tablet  · paxil  · Propranolol for tremor    * C2 cervical fracture (HCC)  Assessment & Plan  · 8/21 CT cspine: anterioinferior C2 fx, L transverse process fx C3, old L clavicle fx  · Neurosurgery consulted  · Appreciate recommendations  · Cervical spine precautions  · Cont vista collar  · Cont ASA          Disposition: PT/OT recommending post acute rehab, family insisting on discharge to home    SUBJECTIVE:    Subjective: No acute events overnight  Continues to desaturate to <80% SpO2 when ambulating  Was on 1 5L NC this AM with normal SpO2  Tolerating diet      OBJECTIVE:   Vitals:   Temp:  [98 °F (36 7 °C)-99 4 °F (37 4 °C)] 99 1 °F (37 3 °C)  HR:  [75-87] 87  Resp:  [16-20] 18  BP: (102-125)/(55-76) 102/58    Intake/Output:  I/O       08/28 0701 08/29 0700 08/29 0701 08/30 0700 08/30 0701 08/31 0700    P  O   118     Total Intake(mL/kg)  118 (2 5)     Urine (mL/kg/hr)       Total Output       Net  +118            Unmeasured Urine Occurrence 3 x  1 x    Unmeasured Stool Occurrence 1 x  1 x         Nutrition: Diet Dysphagia/Modified Consistency; Dysphagia 3-Dental Soft; Thin Liquid  GI Proph/Bowel Reg: zofran, senokot  VTE Prophylaxis: eliquis    Physical Exam:  General: No acute distress  Neuro: alert and oriented  HEENT: nasal cannula in place  CV: Well perfused, regular rate and rhythm  Lungs: Normal work of breathing, no increased respiratory effort on supplemental oxygen  Abdomen: Soft, non-tender, non-distended  Extremities: No edema, clubbing or cyanosis  Skin: Warm, dry      Invasive Devices  Report    Peripheral Intravenous Line  Duration           Peripheral IV 08/28/22 Right Antecubital 2 days          Drain  Duration           External Urinary Catheter -- days                      Lab Results: No new labs today  Imaging/EKG Studies: I have personally reviewed pertinent reports

## 2022-08-30 NOTE — DISCHARGE SUMMARY
[facility] [facility]  Discharge- Auber Cutting 1960, 60 y o  female MRN: [id number]  Unit/Bed#: [facility] 627-01 Encounter: [id number]  Primary Care Provider: No primary care provider on file  Date and time admitted to hospital: 8/21/2022  9:49 AM    COVID-19  Assessment & Plan  · Patient found to be COVID positive prior to discharge  · Weigh risks and benefits of mild COVID pathway  Consider starting if patient has hypoxia or increasing O2 requirement  Acute blood loss anemia  Assessment & Plan  · Likely 2/2 scalp laceration losses  · S/p 1u PRBC with appropriate response and stability  · Stable hemoglobin since admission  · Check Hgb if any change in hemodynamics or suspicion for blood loss      MGUS (monoclonal gammopathy of unknown significance)  Assessment & Plan  · Per Epic review, MGUS w/ h/o recurrent DVTs on Eliquis/ASA      Pulmonary emphysema (HCC)  Assessment & Plan  · Arrived to \[facility]\"" on 3L supplemental O2 sating 100%  · PRN bronchodilators  · Q1hr I/S use  · 1PPD smoker  · Nicotine patch  · Goal SpO2 >88%, currently with recorded desaturations to <80% while ambulating  · Pulmonology consulted -- home with supplemental oxygen, outpatient follow up with PFTs, prednisone 40mg QD x7 days    DVT (deep venous thrombosis) ([facility] [address] )  Assessment & Plan  · Continue home eliquis (resumed 8/22/22)    Fall  Assessment & Plan  · 8/21 mechanical fall d/t ambulatory dysfunction and Parkinson's disease  · No home assistive devices for ambulation  · PT/OT consulted, recommending post acute rehab   Family prefers to care for the patient at home with VNA      Scalp laceration, initial encounter  Assessment & Plan  · 11cm scalp laceration after fall  · S/p staple repair at OSH 8/21  · Staple removal 10-14 days (8/31-9/4)  · Outpatient follow up trauma clinic    Unspecified injury of left vertebral artery, initial encounter  Assessment & Plan  8/21 CTA neck: "No vascular enhancement of left vertebral artery in the neck  However, in this patient with no acute/persistent neurologic symptoms, the appearance is most suggestive of anatomic variation rather than acute traumatic left vertebral artery injury, specifically as there is no enhancement even of left vertebral artery origin  There is enhancement of left vertebral artery and left posterior cerebellar artery above the level of foramen magnum presumably via collateral circulation across vertebral basilar junction  There are patent posterior communicating arteries bilaterally and the basilar artery is somewhat diminutive consistent with anatomic variation  "    - Neurosurgery consulted  - 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      Nasal fracture  Assessment & Plan  · 8/21 CT facial bones: nasal bone fracture  · Unasyn for PPX EOT 8/26  · S/p operative fixation by OMFS on 8/22/22  · Sinus precautions    C3 cervical fracture (HCC)  Assessment & Plan  As per C2 cervical fracture plan      Parkinson disease ([facility] [address] )  Assessment & Plan  · Continue home carbidopa-levodopa-entacapone (STALEVO) -200 MG per tablet  · paxil  · Propranolol for tremor    * C2 cervical fracture (HCC)  Assessment & Plan  · 8/21 CT cspine: anterioinferior C2 fx, L transverse process fx C3, old L clavicle fx  · Neurosurgery consulted  · Appreciate recommendations  · Cervical spine precautions  · Cont vista collar  · Cont ASA              Medical Problems             Resolved Problems  Date Reviewed: 8/22/2022          Resolved    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     Resolved by  Stephy Mao PA-C                Admission Date:   Admission Orders (From admission, onward)     Ordered        08/21/22 1038  Inpatient Admission  Once                        Admitting Diagnosis: Nasal bone fracture [S02  2XXA]  Closed nondisplaced fracture of second cervical vertebra, unspecified fracture morphology, initial encounter [facility]) [S12 101A]    HPI: Per Dr Sade Greene's note 8/21/2022:  "Louise Friday is a 57 y o  female on eliquis who presented to [facility] ED after experiencing and mechanical fall from standing height and striking her head on the shower  Pt was in the bathroom when she fell  She incurred a large scalp laceration that was stapled in the ED  She also was found to have C2 and C3 fractures and no neuro deficits  CTA showed vertebral artery fillin deficit vs anatomical variation  She was transferred to [facility] were she will be sent to [facility] "    Procedures Performed: No orders of the defined types were placed in this encounter  Summary of Hospital Course: Ms Yvon Loya was transferred to [facility] as a trauma patient after sustaining a mechanical fall  She was evaluated by neurosurgery and they decided to manage the patient without surgical intervention for her C2 and C3 cervical fractures  She was given a vista cervical collar and monitored closely  She was observed in the ICU with serial neurological checks  Care was taken to continue her home medications to manage her Parkinson's  She also presented with a scalp laceration that was repaired with staples at the outside hospital prior to transfer  Staples are due to be removed around 8/31-9/4  She was found to have a nasal fracture for which OMFS repaired with a closed reduction on 8/22  They recommended a course of antibiotics for 5-7 days which she completed in the hospital  She was transferred out of the ICU after the surgery  Her hospital course was uncomplicated for the most part, but she had a persistent supplemental oxygen requirement  When she was stable enough for discharge, pulmonology evaluated the patient and cleared her for discharge with home supplemental oxygen  They are recommending a follow up as an outpatient with pulmonary function tests       On the day of discharge, the patient was ambulating, tolerating a regular diet, hemodynamically stable on supplemental oxygen, and pain was well-controlled  She was instructed to follow up with neurosurgery, pulmonology, and trauma clinic for staple removal     She and her  expressed understanding of the plan and need for follow up  Significant Findings, Care, Treatment and Services Provided:   8/21: vista cervical collar  8/22 closed reduction of nasal fracture with OMFS    Complications: The patient's hospital course was complicated by COVID infection and persistent supplemental oxygen requirement  Condition at Discharge: stable         Discharge instructions/Information to patient and family:   See after visit summary for information provided to patient and family  Provisions for Follow-Up Care:  See after visit summary for information related to follow-up care and any pertinent home health orders  PCP: No primary care provider on file  Disposition: Home with VNA and supplemental oxygen    Planned Readmission: No    Discharge Statement   I spent 20 minutes discharging the patient  This time was spent on the day of discharge  I had direct contact with the patient on the day of discharge  Additional documentation is required if more than 30 minutes were spent on discharge  Discharge Medications:  See after visit summary for reconciled discharge medications provided to patient and family

## 2022-08-30 NOTE — PROGRESS NOTES
PULMONOLOGY PROGRESS NOTE     Name: Harsha Ram   Age & Sex: 60 y o  female   MRN: [id number]  Unit/Bed#: [address] 627-01   Encounter: [id number]    PATIENT INFORMATION     Name: Harsha Ram   Age & Sex: 60 y o  female   MRN: [id number]  Hospital Stay Days: 9    ASSESSMENT/PLAN     1  Acute hypoxic respiratory failure  - May have had a chronic component with her degree of chronic emphysema  - Unclear if her COVID infection in playing a role, does not appear overtly symptomatic  - Not previously on home O2    2  Pulmonary emphysema  - On imaging, no PFTs available  - Doesn't state that she has chronic dyspnea or mucus production consistent with symptomatic COPD    3  Tobacco use disorder  - On NRT in hospital, uncertain if she is interested in continuing as outpatient    4  Mechanical fall  - Likely secondary to parkinson disease  - Complicated by cervical spine and nasal fx    5  Parkinson disease    Plan:  - Would give prednisone 40mg daily x 7 days for COPD/COVID since her source of hypoxia is not completely clear  May be chronic, but could have a component from her COVID infection   - Ambulatory O2 evaluation revealed desaturation to 87% on room air with exertion  Improves to 90% on 2L  - Will need home O2 provided on discharge  - Continue budesonide and perforomist on discharge  Will need prescription for a DME:Nebulizer and supplies  - Provide NRT, encouraged smoking cessation  - Will need outpatient pulmonary follow up for continued O2 assessment and probably PFTs    SUBJECTIVE     Patient seen and examined  No acute events overnight  Patient states she feels well today  Her pain is well controlled  She denies feeling sick or subjective fevers  Denies respiratory concerns, cough, sputum production, chest pain or tightness  ROS otherwise normal  Excited to be able to go home soon       OBJECTIVE     Vitals:    08/30/22 0900 08/30/22 0910 08/30/22 1224 08/30/22 1225   BP:  102/58  (!) 92/49 BP Location:  Right arm  Right arm   Pulse: 87   73   Resp: 18   19   Temp: 99 1 °F (37 3 °C)   98 5 °F (36 9 °C)   TempSrc: Oral   Oral   SpO2: 95%  97% (!) 87%   Weight:       Height:          Temperature:   Temp (24hrs), Av 8 °F (37 1 °C), Min:98 °F (36 7 °C), Max:99 4 °F (37 4 °C)    Temperature: 98 5 °F (36 9 °C)  Intake & Output:  I/O        07 07 07 07 0701   0700    P  O   118     Total Intake(mL/kg)  118 (2 5)     Urine (mL/kg/hr)       Total Output       Net  +118            Unmeasured Urine Occurrence 3 x  1 x    Unmeasured Stool Occurrence 1 x  1 x        Weights:        Body mass index is 19 45 kg/m²  Weight (last 2 days)     Date/Time Weight    22 0600 46 7 (102 96)        Physical Exam  Vitals and nursing note reviewed  Constitutional:       General: She is not in acute distress  Appearance: Normal appearance  She is well-developed  HENT:      Head: Normocephalic and atraumatic  Mouth/Throat:      Pharynx: No oropharyngeal exudate or posterior oropharyngeal erythema  Eyes:      General: No scleral icterus  Conjunctiva/sclera: Conjunctivae normal    Neck:      Comments: In C-collar  Cardiovascular:      Rate and Rhythm: Normal rate and regular rhythm  Heart sounds: No murmur heard  No friction rub  No gallop  Pulmonary:      Effort: Pulmonary effort is normal  No respiratory distress  Breath sounds: Normal breath sounds  No wheezing, rhonchi or rales  Abdominal:      General: Bowel sounds are normal  There is no distension  Palpations: Abdomen is soft  Tenderness: There is no abdominal tenderness  There is no guarding  Musculoskeletal:      Right lower leg: No edema  Left lower leg: No edema  Skin:     General: Skin is warm and dry  Neurological:      General: No focal deficit present  Mental Status: She is alert and oriented to person, place, and time        Comments: Tremor present LABORATORY DATA     Labs: I have personally reviewed pertinent reports  Results from last 7 days   Lab Units 08/24/22  0451   WBC Thousand/uL 5 64   HEMOGLOBIN g/dL 9 7*   HEMATOCRIT % 31 6*   PLATELETS Thousands/uL 172      Results from last 7 days   Lab Units 08/24/22  0451   POTASSIUM mmol/L 3 8   CHLORIDE mmol/L 104   CO2 mmol/L 31   BUN mg/dL 10   CREATININE mg/dL 0 72   CALCIUM mg/dL 8 6   ALK PHOS U/L 162*   ALT U/L 16   AST U/L 48*                              ABG:       IMAGING & DIAGNOSTIC TESTING     Radiology Results: I have personally reviewed pertinent reports  XR chest portable    Result Date: 8/21/2022  Impression: Emphysematous changes are noted  No focal consolidation, pleural effusion, or pneumothorax  Workstation performed: [id number]     XR spine cervical 2 or 3 vw injury    Result Date: 8/22/2022  Impression: No change C2 reported fracture  C3 left transverse process fracture not appreciated radiographically  Workstation performed: [id number]     CT head without contrast    Result Date: 8/21/2022  Impression: Large posterior vertex scalp hematoma/laceration, no calvarial fracture or acute intracranial abnormality is seen  Other findings as above  CT CERVICAL SPINE - WITHOUT CONTRAST INDICATION:   Head trauma, moderate-severe Fall, head injury, face injury, on Eliquis  COMPARISON:  None  TECHNIQUE:  CT examination of the cervical spine was performed without intravenous contrast   Contiguous axial images were obtained  Sagittal and coronal reconstructions were performed  Radiation dose length product (DLP) for this visit:  76 310 744 mGy-cm  (accession [id number]), 314 mGy-cm  (accession [id number]), 326 mGy-cm  (accession [id number])  This examination, like all CT scans performed in the [facility], was performed utilizing techniques to minimize radiation dose exposure, including the use of iterative reconstruction and automated exposure control  IMAGE QUALITY:  Diagnostic  FINDINGS: ALIGNMENT:  Normal alignment of the cervical spine  No subluxation  VERTEBRAL BODIES:  Oblique fracture of the anteroinferior aspect of C2 (sagittal image 57, series 604)  Oblique fracture of the left transverse process of C3 (axial image 62, series 5)  Visualized bones otherwise appear intact  Anterior plate and screw fusion of C4-C7  Hardware appears intact  DEGENERATIVE CHANGES:  Intervertebral disc space narrowing at C2/C3, C3/C4, and C7/T1 with anterior, marginal, and uncovertebral osteophytosis at these levels  Multilevel facet joint arthropathy  PREVERTEBRAL AND PARASPINAL SOFT TISSUES:  Grossly unremarkable THORACIC INLET:  Old fracture of the left clavicle  Moderate to severe pulmonary emphysematous changes  Mild atherosclerosis  IMPRESSION: Oblique fracture of the anteroinferior aspect of C2 (sagittal image 57, series 604)  Oblique fracture of the left transverse process of C3 (axial image 62, series 5)  Recommend CTA of the neck to exclude vascular injury on the left  Spinal alignment appears maintained  Degenerative changes as described  Old fracture of the left clavicle, pulmonary emphysematous changes, and other findings as above  CT FACIAL BONES WITHOUT INTRAVENOUS CONTRAST INDICATION:   Head trauma, moderate-severe Fall, head injury, face injury, on Eliquis  COMPARISON: None  TECHNIQUE:  Axial CT images were obtained through the facial bones with additional sagittal and coronal reconstructions  Radiation dose length product (DLP) for this visit:  76 310 744 mGy-cm  (accession [id number]), 314 mGy-cm  (accession [id number]), 326 mGy-cm  (accession [id number])  This examination, like all CT scans performed in the [facility], was performed utilizing techniques to minimize radiation dose exposure, including the use of iterative reconstruction and automated exposure control  IMAGE QUALITY:  Diagnostic  FINDINGS: FACIAL BONES:  Nasal bone fracture    Normal alignment of the temporomandibular joints  No suspicious appearing osseous lesion  ORBITS:  Orbital globes, optic nerves, and extraocular muscles appear symmetric and normal  There is no evidence of retrobulbar mass, abscess, or hematoma  SINUSES:  Opacification of several bilateral ethmoid air cells; otherwise grossly unremarkable  SOFT TISSUES:  Perinasal soft tissue swelling and a small amount of subcutaneous emphysema  IMPRESSION: Nasal bone fracture with associated perinasal soft tissue swelling  The orbits appear intact  Other findings as above  I personally discussed this study with Busby Friends on 8/21/2022 at 4:32 AM  Workstation performed: [id number]     CT facial bones wo contrast    Result Date: 8/21/2022  Impression: Large posterior vertex scalp hematoma/laceration, no calvarial fracture or acute intracranial abnormality is seen  Other findings as above  CT CERVICAL SPINE - WITHOUT CONTRAST INDICATION:   Head trauma, moderate-severe Fall, head injury, face injury, on Eliquis  COMPARISON:  None  TECHNIQUE:  CT examination of the cervical spine was performed without intravenous contrast   Contiguous axial images were obtained  Sagittal and coronal reconstructions were performed  Radiation dose length product (DLP) for this visit:  76 310 744 mGy-cm  (accession [id number]), 314 mGy-cm  (accession [id number]), 326 mGy-cm  (accession [id number])  This examination, like all CT scans performed in the [facility], was performed utilizing techniques to minimize radiation dose exposure, including the use of iterative reconstruction and automated exposure control  IMAGE QUALITY:  Diagnostic  FINDINGS: ALIGNMENT:  Normal alignment of the cervical spine  No subluxation  VERTEBRAL BODIES:  Oblique fracture of the anteroinferior aspect of C2 (sagittal image 57, series 604)  Oblique fracture of the left transverse process of C3 (axial image 62, series 5)  Visualized bones otherwise appear intact    Anterior plate and screw fusion of C4-C7  Hardware appears intact  DEGENERATIVE CHANGES:  Intervertebral disc space narrowing at C2/C3, C3/C4, and C7/T1 with anterior, marginal, and uncovertebral osteophytosis at these levels  Multilevel facet joint arthropathy  PREVERTEBRAL AND PARASPINAL SOFT TISSUES:  Grossly unremarkable THORACIC INLET:  Old fracture of the left clavicle  Moderate to severe pulmonary emphysematous changes  Mild atherosclerosis  IMPRESSION: Oblique fracture of the anteroinferior aspect of C2 (sagittal image 57, series 604)  Oblique fracture of the left transverse process of C3 (axial image 62, series 5)  Recommend CTA of the neck to exclude vascular injury on the left  Spinal alignment appears maintained  Degenerative changes as described  Old fracture of the left clavicle, pulmonary emphysematous changes, and other findings as above  CT FACIAL BONES WITHOUT INTRAVENOUS CONTRAST INDICATION:   Head trauma, moderate-severe Fall, head injury, face injury, on Eliquis  COMPARISON: None  TECHNIQUE:  Axial CT images were obtained through the facial bones with additional sagittal and coronal reconstructions  Radiation dose length product (DLP) for this visit:  76 310 744 mGy-cm  (accession [id number]), 314 mGy-cm  (accession [id number]), 326 mGy-cm  (accession [id number])  This examination, like all CT scans performed in the [facility], was performed utilizing techniques to minimize radiation dose exposure, including the use of iterative reconstruction and automated exposure control  IMAGE QUALITY:  Diagnostic  FINDINGS: FACIAL BONES:  Nasal bone fracture  Normal alignment of the temporomandibular joints  No suspicious appearing osseous lesion  ORBITS:  Orbital globes, optic nerves, and extraocular muscles appear symmetric and normal  There is no evidence of retrobulbar mass, abscess, or hematoma  SINUSES:  Opacification of several bilateral ethmoid air cells; otherwise grossly unremarkable   SOFT TISSUES: Perinasal soft tissue swelling and a small amount of subcutaneous emphysema  IMPRESSION: Nasal bone fracture with associated perinasal soft tissue swelling  The orbits appear intact  Other findings as above  I personally discussed this study with Melina Gomez on 8/21/2022 at 4:32 AM  Workstation performed: [id number]     CTA neck with and without contrast    Result Date: 8/21/2022  Impression: No vascular enhancement of left vertebral artery in the neck  However, in this patient with no acute/persistent neurologic symptoms, the appearance is most suggestive of anatomic variation rather than acute traumatic left vertebral artery injury, specifically as there is no enhancement even of left vertebral artery origin  There is enhancement of left vertebral artery and left posterior cerebellar artery above the level of foramen magnum presumably via collateral circulation across vertebral basilar junction  There are patent posterior communicating arteries bilaterally and the basilar artery is somewhat diminutive consistent with anatomic variation  Continued neurologic monitoring is recommended to exclude the development of neurologic symptoms  Atherosclerotic changes, more advanced stability expected for the patient's age but without flow-limiting atherosclerotic stenosis  Emphysematous changes in the visualized upper lung zones  Reidentification of cervical spine degenerative changes and no cervical spine fractures without malalignment  I personally discussed this study with Melina Gomez on 8/21/2022 at 6:10 AM  Workstation performed: [id number]     CT spine cervical without contrast    Result Date: 8/21/2022  Impression: Large posterior vertex scalp hematoma/laceration, no calvarial fracture or acute intracranial abnormality is seen  Other findings as above  CT CERVICAL SPINE - WITHOUT CONTRAST INDICATION:   Head trauma, moderate-severe Fall, head injury, face injury, on Eliquis  COMPARISON:  None  TECHNIQUE:  CT examination of the cervical spine was performed without intravenous contrast   Contiguous axial images were obtained  Sagittal and coronal reconstructions were performed  Radiation dose length product (DLP) for this visit:  76 310 744 mGy-cm  (accession [id number]), 314 mGy-cm  (accession [id number]), 326 mGy-cm  (accession [id number])  This examination, like all CT scans performed in the [facility], was performed utilizing techniques to minimize radiation dose exposure, including the use of iterative reconstruction and automated exposure control  IMAGE QUALITY:  Diagnostic  FINDINGS: ALIGNMENT:  Normal alignment of the cervical spine  No subluxation  VERTEBRAL BODIES:  Oblique fracture of the anteroinferior aspect of C2 (sagittal image 57, series 604)  Oblique fracture of the left transverse process of C3 (axial image 62, series 5)  Visualized bones otherwise appear intact  Anterior plate and screw fusion of C4-C7  Hardware appears intact  DEGENERATIVE CHANGES:  Intervertebral disc space narrowing at C2/C3, C3/C4, and C7/T1 with anterior, marginal, and uncovertebral osteophytosis at these levels  Multilevel facet joint arthropathy  PREVERTEBRAL AND PARASPINAL SOFT TISSUES:  Grossly unremarkable THORACIC INLET:  Old fracture of the left clavicle  Moderate to severe pulmonary emphysematous changes  Mild atherosclerosis  IMPRESSION: Oblique fracture of the anteroinferior aspect of C2 (sagittal image 57, series 604)  Oblique fracture of the left transverse process of C3 (axial image 62, series 5)  Recommend CTA of the neck to exclude vascular injury on the left  Spinal alignment appears maintained  Degenerative changes as described  Old fracture of the left clavicle, pulmonary emphysematous changes, and other findings as above  CT FACIAL BONES WITHOUT INTRAVENOUS CONTRAST INDICATION:   Head trauma, moderate-severe Fall, head injury, face injury, on Eliquis  COMPARISON: None   TECHNIQUE: Axial CT images were obtained through the facial bones with additional sagittal and coronal reconstructions  Radiation dose length product (DLP) for this visit:  76 310 744 mGy-cm  (accession [id number]), 314 mGy-cm  (accession [id number]), 326 mGy-cm  (accession [id number])  This examination, like all CT scans performed in the [facility], was performed utilizing techniques to minimize radiation dose exposure, including the use of iterative reconstruction and automated exposure control  IMAGE QUALITY:  Diagnostic  FINDINGS: FACIAL BONES:  Nasal bone fracture  Normal alignment of the temporomandibular joints  No suspicious appearing osseous lesion  ORBITS:  Orbital globes, optic nerves, and extraocular muscles appear symmetric and normal  There is no evidence of retrobulbar mass, abscess, or hematoma  SINUSES:  Opacification of several bilateral ethmoid air cells; otherwise grossly unremarkable  SOFT TISSUES:  Perinasal soft tissue swelling and a small amount of subcutaneous emphysema  IMPRESSION: Nasal bone fracture with associated perinasal soft tissue swelling  The orbits appear intact  Other findings as above  I personally discussed this study with Melina Gomez on 8/21/2022 at 4:32 AM  Workstation performed: [id number]     Other Diagnostic Testing: I have personally reviewed pertinent reports      ACTIVE MEDICATIONS     Current Facility-Administered Medications   Medication Dose Route Frequency    acetaminophen (TYLENOL) tablet 975 mg  975 mg Oral Q8H [facility]    albuterol inhalation solution 2 5 mg  2 5 mg Nebulization Q6H PRN    apixaban (ELIQUIS) tablet 5 mg  5 mg Oral BID    aspirin (ECOTRIN LOW STRENGTH) EC tablet 81 mg  81 mg Oral Daily    budesonide (PULMICORT) inhalation solution 0 5 mg  0 5 mg Nebulization Q12H    carbidopa-levodopa (SINEMET)  mg per tablet 2 tablet  2 tablet Oral 4x Daily    And    entacapone (COMTAN) tablet 200 mg  200 mg Oral 4x Daily    formoterol (PERFOROMIST) nebulizer solution 20 mcg  20 mcg Nebulization Q12H    gabapentin (NEURONTIN) capsule 300 mg  300 mg Oral TID    lidocaine (LIDODERM) 5 % patch 1 patch  1 patch Topical Daily    morphine injection 2 mg  2 mg Intravenous Q3H PRN    nicotine (NICODERM CQ) 14 mg/24hr TD 24 hr patch 14 mg  14 mg Transdermal Daily    ondansetron (ZOFRAN) injection 4 mg  4 mg Intravenous Q4H PRN    oxyCODONE (ROXICODONE) IR tablet 2 5 mg  2 5 mg Oral Q4H PRN    oxyCODONE (ROXICODONE) IR tablet 5 mg  5 mg Oral Q4H PRN    PARoxetine (PAXIL) tablet 20 mg  20 mg Oral Daily    propranolol (INDERAL LA) 24 hr capsule 160 mg  160 mg Oral Daily    senna-docusate sodium (SENOKOT S) 8 6-50 mg per tablet 2 tablet  2 tablet Oral BID       VTE Pharmacologic Prophylaxis: Eliquis  VTE Mechanical Prophylaxis: sequential compression device      Disclaimer: Portions of the record may have been created with voice recognition software  Occasional wrong word or "sound a like" substitutions may have occurred due to the inherent limitations of voice recognition software  Careful consideration should be taken to recognize, using context, where substitutions have occurred      Yary Sheets DO  Pulmonary/Critical Care Fellowship PGY-V  [facility]

## 2022-08-30 NOTE — ASSESSMENT & PLAN NOTE
· 8/21 CT facial bones: nasal bone fracture  · Unasyn for PPX EOT 8/26  · S/p operative fixation by OMFS on 8/22/22  · Sinus precautions

## 2022-08-30 NOTE — PLAN OF CARE
Problem: PHYSICAL THERAPY ADULT  Goal: Performs mobility at highest level of function for planned discharge setting  See evaluation for individualized goals  Description: Treatment/Interventions: Functional transfer training, LE strengthening/ROM, Elevations, Therapeutic exercise, Endurance training, Equipment eval/education, Bed mobility, Gait training, Spoke to nursing, OT  Equipment Recommended:  (r/o rw)       See flowsheet documentation for full assessment, interventions and recommendations  Outcome: Progressing  Note: Prognosis: Good  Problem List: Decreased strength, Decreased endurance, Impaired balance, Decreased mobility  Assessment: Pt demonstrated improved functional endurance today, ambulating increased distances on RA prior to sitting, but does demosntrate similar drop in O2 sats with activity as noted above when she does so on RA  Once on supplemental O2, pt maintains stable vitals  She did remain asymptomatic despite changes in sats  Pt performs all tasks safely with use of RW & reports she has access to home at d/c & will use one upon return home  She also reports increased home support is available to her as compared to eval   Primary concern at this time from functional standpoint is pt's continued reliance on supplemental O2 , which is below her baseline and adds a significant tripping hazard to a pt who was admitted with a fall & is likely to continue to remain a general risk due to premorbid Parkinson's  If pt is to have incrased home support to manage ADLs prn & provide safe environment for mobitily, she may be able to d/c home from hospital with follow up home PT  If this is unavailable, she will continue to benfit from rehab at d/c to address fucntional deficits & maximize endurance, ideally off supplemental O2 to ensure safe return to prevmorbid environment  CM informed of pt needs if she is to return home    Barriers to Discharge: Decreased caregiver support  Barriers to Discharge Comments: increased ambulation & CLIFTON with decreased assist  PT Discharge Recommendation: Post acute rehabilitation services (vs home pending home support)    See flowsheet documentation for full assessment

## 2022-08-30 NOTE — PHYSICAL THERAPY NOTE
Physical Therapy Progress Note     08/30/22 1149   Note Type   Note Type Treatment   Pain Assessment   Pain Score No Pain   Restrictions/Precautions   Braces or Orthoses C/S Collar   Other Precautions Fall Risk;Pain;Spinal precautions;O2  (COVID 19 +)   Subjective   Subjective Pt encountered seated in recliner, pleasatn and hopeful to d/c today  Reprots no pain & no other complaints with activity  Reprots sister can assist with tasks around the hosue despite her medical condition & she can also have sister in law for additional assist at times when  is at work  Transfers   Sit to Stand 5  Supervision   Additional items Assist x 1   Stand to Sit 5  Supervision   Additional items Assist x 1   Toilet transfer 5  Supervision   Additional items Assist x 1;Standard toilet   Ambulation/Elevation   Gait pattern   (Parkinsonian)   Gait Assistance 5  Supervision   Additional items Assist x 1   Assistive Device Rolling walker   Distance 60', 7' x 2   Stair Management Assistance 4  Minimal assist   Additional items Assist x 1   Stair Management Technique Two rails; Foreward;Backward  (RW for BUE support, forward on, backwards off)   Number of Stairs 6  (6" step stool simulating steps)   Endurance Deficit   Endurance Deficit Yes   Endurance Deficit Description (S)  SpO2 97% on 2L O2 nc prior to activity, dropping to 87% ambulating on RA, returning to 92-4% with 1-2 minute rest on RA, placed back to 2L O2 with return to 95-7% sustained with further activity in room including stair training   Activity Tolerance   Activity Tolerance Patient tolerated treatment well;Patient limited by fatigue   Nurse [id number] [place], RN   Assessment   Prognosis Good   Problem List Decreased strength;Decreased endurance; Impaired balance;Decreased mobility   Assessment Pt demonstrated improved functional endurance today, ambulating increased distances on RA prior to sitting, but does demosntrate similar drop in O2 sats with activity as noted above when she does so on RA  Once on supplemental O2, pt maintains stable vitals  She did remain asymptomatic despite changes in sats  Pt performs all tasks safely with use of RW & reports she has access to home at d/c & will use one upon return home  She also reports increased home support is available to her as compared to eval   Primary concern at this time from functional standpoint is pt's continued reliance on supplemental O2 , which is below her baseline and adds a significant tripping hazard to a pt who was admitted with a fall & is likely to continue to remain a general risk due to premorbid Parkinson's  If pt is to have incrased home support to manage ADLs prn & provide safe environment for mobitily, she may be able to d/c home from hospital with follow up home PT  If this is unavailable, she will continue to benfit from rehab at d/c to address fucntional deficits & maximize endurance, ideally off supplemental O2 to ensure safe return to prevmorbid environment  CM informed of pt needs if she is to return home  Barriers to Discharge Decreased caregiver support   Goals   Patient Goals to go home   STG Expiration Date 09/01/22  (confirmed with evaluating PT since goals are written for 10 days s/p eval date)   PT Treatment Day 3   Plan   Treatment/Interventions Functional transfer training;LE strengthening/ROM; Elevations; Therapeutic exercise; Endurance training;Patient/family training;Equipment eval/education; Bed mobility;Gait training   Progress Progressing toward goals   PT Frequency Other (Comment)  (3-6x/week)   Recommendation   PT Discharge Recommendation Post acute rehabilitation services  (vs home pending home support)   Equipment Recommended [place] walker   AM-PAC Basic Mobility Inpatient   Turning in Bed Without Bedrails 4   Lying on Back to Sitting on Edge of Flat Bed 4   Moving Bed to Chair 4   Standing Up From Chair 4   Walk in Room 4   Climb 3-5 Stairs 3   Basic Mobility Inpatient Raw Score 23   Basic Mobility Standardized Score 50 88   Highest Level Of Mobility   JH-HLM Goal 7: Walk 25 feet or more   JH-HLM Achieved 7: Walk 25 feet or more     Kenny Smith PTA    An [facility] Basic Mobility Standardized Score of 40 78 suggests pt would benefit from post acute rehab

## 2022-08-30 NOTE — ASSESSMENT & PLAN NOTE
· 8/21 mechanical fall d/t ambulatory dysfunction and Parkinson's disease  · No home assistive devices for ambulation  · PT/OT consulted, recommending post acute rehab   Family prefers to care for the patient at home with [facility]

## 2022-08-30 NOTE — PLAN OF CARE
Problem: MOBILITY - ADULT  Goal: Maintain or return to baseline ADL function  Description: INTERVENTIONS:  -  Assess patient's ability to carry out ADLs; assess patient's baseline for ADL function and identify physical deficits which impact ability to perform ADLs (bathing, care of mouth/teeth, toileting, grooming, dressing, etc )  - Assess/evaluate cause of self-care deficits   - Assess range of motion  - Assess patient's mobility; develop plan if impaired  - Assess patient's need for assistive devices and provide as appropriate  - Encourage maximum independence but intervene and supervise when necessary  - Involve family in performance of ADLs  - Assess for home care needs following discharge   - Consider OT consult to assist with ADL evaluation and planning for discharge  - Provide patient education as appropriate  Outcome: Progressing  Goal: Maintains/Returns to pre admission functional level  Description: INTERVENTIONS:  - Perform BMAT or MOVE assessment daily    - Set and communicate daily mobility goal to care team and patient/family/caregiver  - Collaborate with rehabilitation services on mobility goals if consulted  - Perform Range of Motion 3 times a day  - Reposition patient every 2 hours    - Dangle patient 3 times a day  - Stand patient 3 times a day  - Ambulate patient 3 times a day  - Out of bed to chair 3 times a day   - Out of bed for meals 3 times a day  - Out of bed for toileting  - Record patient progress and toleration of activity level   Outcome: Progressing     Problem: PAIN - ADULT  Goal: Verbalizes/displays adequate comfort level or baseline comfort level  Description: Interventions:  - Encourage patient to monitor pain and request assistance  - Assess pain using appropriate pain scale  - Administer analgesics based on type and severity of pain and evaluate response  - Implement non-pharmacological measures as appropriate and evaluate response  - Consider cultural and social influences on pain and pain management  - Notify physician/advanced practitioner if interventions unsuccessful or patient reports new pain  Outcome: Progressing     Problem: INFECTION - ADULT  Goal: Absence or prevention of progression during hospitalization  Description: INTERVENTIONS:  - Assess and monitor for signs and symptoms of infection  - Monitor lab/diagnostic results  - Monitor all insertion sites, i e  indwelling lines, tubes, and drains  - Monitor endotracheal if appropriate and nasal secretions for changes in amount and color  - [place] appropriate cooling/warming therapies per order  - Administer medications as ordered  - Instruct and encourage patient and family to use good hand hygiene technique  - Identify and instruct in appropriate isolation precautions for identified infection/condition  Outcome: Progressing     Problem: SAFETY ADULT  Goal: Maintain or return to baseline ADL function  Description: INTERVENTIONS:  -  Assess patient's ability to carry out ADLs; assess patient's baseline for ADL function and identify physical deficits which impact ability to perform ADLs (bathing, care of mouth/teeth, toileting, grooming, dressing, etc )  - Assess/evaluate cause of self-care deficits   - Assess range of motion  - Assess patient's mobility; develop plan if impaired  - Assess patient's need for assistive devices and provide as appropriate  - Encourage maximum independence but intervene and supervise when necessary  - Involve family in performance of ADLs  - Assess for home care needs following discharge   - Consider OT consult to assist with ADL evaluation and planning for discharge  - Provide patient education as appropriate  Outcome: Progressing  Goal: Maintains/Returns to pre admission functional level  Description: INTERVENTIONS:  - Perform BMAT or MOVE assessment daily    - Set and communicate daily mobility goal to care team and patient/family/caregiver     - Collaborate with rehabilitation services on mobility goals if consulted  - Perform Range of Motion 3 times a day  - Reposition patient every 2 hours    - Dangle patient 3 times a day  - Stand patient 3 times a day  - Ambulate patient 3 times a day  - Out of bed to chair 3 times a day   - Out of bed for meals 3 times a day  - Out of bed for toileting  - Record patient progress and toleration of activity level   Outcome: Progressing  Goal: Patient will remain free of falls  Description: INTERVENTIONS:  - Educate patient/family on patient safety including physical limitations  - Instruct patient to call for assistance with activity   - Consult OT/PT to assist with strengthening/mobility   - Keep Call bell within reach  - Keep bed low and locked with side rails adjusted as appropriate  - Keep care items and personal belongings within reach  - Initiate and maintain comfort rounds  - Make Fall Risk Sign visible to staff  - Offer Toileting every 2 Hours, in advance of need  - Initiate/Maintain *bed/chair **alarm  - Obtain necessary fall risk management equipment:   - Apply yellow socks and bracelet for high fall risk patients  - Consider moving patient to room near nurses station  Outcome: Progressing     Problem: DISCHARGE PLANNING  Goal: Discharge to home or other facility with appropriate resources  Description: INTERVENTIONS:  - Identify barriers to discharge w/patient and caregiver  - Arrange for needed discharge resources and transportation as appropriate  - Identify discharge learning needs (meds, wound care, etc )  - Arrange for interpretive services to assist at discharge as needed  - Refer to Case Management Department for coordinating discharge planning if the patient needs post-hospital services based on physician/advanced practitioner order or complex needs related to functional status, cognitive ability, or social support system  Outcome: Progressing     Problem: Knowledge Deficit  Goal: Patient/family/caregiver demonstrates understanding of disease process, treatment plan, medications, and discharge instructions  Description: Complete learning assessment and assess knowledge base  Interventions:  - Provide teaching at level of understanding  - Provide teaching via preferred learning methods  Outcome: Progressing     Problem: Nutrition/Hydration-ADULT  Goal: Nutrient/Hydration intake appropriate for improving, restoring or maintaining nutritional needs  Description: Monitor and assess patient's nutrition/hydration status for malnutrition  Collaborate with interdisciplinary team and initiate plan and interventions as ordered  Monitor patient's weight and dietary intake as ordered or per policy  Utilize nutrition screening tool and intervene as necessary  Determine patient's food preferences and provide high-protein, high-caloric foods as appropriate       INTERVENTIONS:  - Monitor oral intake, urinary output, labs, and treatment plans  - Assess nutrition and hydration status and recommend course of action  - Evaluate amount of meals eaten  - Assist patient with eating if necessary   - Allow adequate time for meals  - Recommend/ encourage appropriate diets, oral nutritional supplements, and vitamin/mineral supplements  - Order, calculate, and assess calorie counts as needed  - Recommend, monitor, and adjust tube feedings and TPN/PPN based on assessed needs  - Assess need for intravenous fluids  - Provide specific nutrition/hydration education as appropriate  - Include patient/family/caregiver in decisions related to nutrition  Outcome: Progressing     Problem: Prexisting or High Potential for Compromised Skin Integrity  Goal: Skin integrity is maintained or improved  Description: INTERVENTIONS:  - Identify patients at risk for skin breakdown  - Assess and monitor skin integrity  - Assess and monitor nutrition and hydration status  - Monitor labs   - Assess for incontinence   - Turn and reposition patient  - Assist with mobility/ambulation  - Relieve pressure over bony prominences  - Avoid friction and shearing  - Provide appropriate hygiene as needed including keeping skin clean and dry  - Evaluate need for skin moisturizer/barrier cream  - Collaborate with interdisciplinary team   - Patient/family teaching  - Consider wound care consult   Outcome: Progressing     Problem: Potential for Falls  Goal: Patient will remain free of falls  Description: INTERVENTIONS:  - Educate patient/family on patient safety including physical limitations  - Instruct patient to call for assistance with activity   - Consult OT/PT to assist with strengthening/mobility   - Keep Call bell within reach  - Keep bed low and locked with side rails adjusted as appropriate  - Keep care items and personal belongings within reach  - Initiate and maintain comfort rounds  - Make Fall Risk Sign visible to staff  - Offer Toileting every 2 Hours, in advance of need  - Initiate/Maintain *bed/chair **alarm  - Obtain necessary fall risk management equipment:   - Apply yellow socks and bracelet for high fall risk patients  - Consider moving patient to room near nurses station  Outcome: Progressing     Problem: NEUROSENSORY - ADULT  Goal: Achieves stable or improved neurological status  Description: INTERVENTIONS  - Monitor and report changes in neurological status  - Monitor vital signs such as temperature, blood pressure, glucose, and any other labs ordered   - Initiate measures to prevent increased intracranial pressure  - Monitor for seizure activity and implement precautions if appropriate      Outcome: Progressing     Problem: CARDIOVASCULAR - ADULT  Goal: Maintains optimal cardiac output and hemodynamic stability  Description: INTERVENTIONS:  - Monitor I/O, vital signs and rhythm  - Monitor for S/S and trends of decreased cardiac output  - Administer and titrate ordered vasoactive medications to optimize hemodynamic stability  - Assess quality of pulses, skin color and temperature  - Assess for signs of decreased coronary artery perfusion  - Instruct patient to report change in severity of symptoms  Outcome: Progressing  Goal: Absence of cardiac dysrhythmias or at baseline rhythm  Description: INTERVENTIONS:  - Continuous cardiac monitoring, vital signs, obtain 12 lead EKG if ordered  - Administer antiarrhythmic and heart rate control medications as ordered  - Monitor electrolytes and administer replacement therapy as ordered  Outcome: Progressing     Problem: RESPIRATORY - ADULT  Goal: Achieves optimal ventilation and oxygenation  Description: INTERVENTIONS:  - Assess for changes in respiratory status  - Assess for changes in mentation and behavior  - Position to facilitate oxygenation and minimize respiratory effort  - Oxygen administered by appropriate delivery if ordered  - Initiate smoking cessation education as indicated  - Encourage broncho-pulmonary hygiene including cough, deep breathe, Incentive Spirometry  - Assess the need for suctioning and aspirate as needed  - Assess and instruct to report SOB or any respiratory difficulty  - Respiratory Therapy support as indicated  Outcome: Progressing     Problem: GASTROINTESTINAL - ADULT  Goal: Minimal or absence of nausea and/or vomiting  Description: INTERVENTIONS:  - Administer IV fluids if ordered to ensure adequate hydration  - Maintain NPO status until nausea and vomiting are resolved  - Nasogastric tube if ordered  - Administer ordered antiemetic medications as needed  - Provide nonpharmacologic comfort measures as appropriate  - Advance diet as tolerated, if ordered  - Consider nutrition services referral to assist patient with adequate nutrition and appropriate food choices  Outcome: Progressing  Goal: Maintains or returns to baseline bowel function  Description: INTERVENTIONS:  - Assess bowel function  - Encourage oral fluids to ensure adequate hydration  - Administer IV fluids if ordered to ensure adequate hydration  - Administer ordered medications as needed  - Encourage mobilization and activity  - Consider nutritional services referral to assist patient with adequate nutrition and appropriate food choices  Outcome: Progressing  Goal: Maintains adequate nutritional intake  Description: INTERVENTIONS:  - Monitor percentage of each meal consumed  - Identify factors contributing to decreased intake, treat as appropriate  - Assist with meals as needed  - Monitor I&O, weight, and lab values if indicated  - Obtain nutrition services referral as needed  Outcome: Progressing     Problem: GENITOURINARY - ADULT  Goal: Maintains or returns to baseline urinary function  Description: INTERVENTIONS:  - Assess urinary function  - Encourage oral fluids to ensure adequate hydration if ordered  - Administer IV fluids as ordered to ensure adequate hydration  - Administer ordered medications as needed  - Offer frequent toileting  - Follow urinary retention protocol if ordered  Outcome: Progressing     Problem: METABOLIC, FLUID AND ELECTROLYTES - ADULT  Goal: Electrolytes maintained within normal limits  Description: INTERVENTIONS:  - Monitor labs and assess patient for signs and symptoms of electrolyte imbalances  - Administer electrolyte replacement as ordered  - Monitor response to electrolyte replacements, including repeat lab results as appropriate  - Instruct patient on fluid and nutrition as appropriate  Outcome: Progressing  Goal: Fluid balance maintained  Description: INTERVENTIONS:  - Monitor labs   - Monitor I/O and WT  - Instruct patient on fluid and nutrition as appropriate  - Assess for signs & symptoms of volume excess or deficit  Outcome: Progressing     Problem: SKIN/TISSUE INTEGRITY - ADULT  Goal: Skin Integrity remains intact(Skin Breakdown Prevention)  Description: Assess:  -Perform Nate assessment every shift  -Clean and moisturize skin every day  -Inspect skin when repositioning, toileting, and assisting with ADLS  -Assess under medical devices such as masimo every shift  -Assess extremities for adequate circulation and sensation     Bed Management:  -Have minimal linens on bed & keep smooth, unwrinkled  -Change linens as needed when moist or perspiring  -Avoid sitting or lying in one position for more than 2 hours while in bed  -Keep HOB at 30 degrees     Toileting:  -Offer bedside commode  -Assess for incontinence every 4 hrs  -Use incontinent care products after each incontinent episode such as foam cleanser    Activity:  -Mobilize patient 3 times a day  -Encourage activity and walks on unit  -Encourage or provide ROM exercises   -Turn and reposition patient every 2 Hours  -Use appropriate equipment to lift or move patient in bed  -Instruct/ Assist with weight shifting every 1 hr when out of bed in chair  -Consider limitation of chair time 2 hour intervals    Skin Care:  -Avoid use of baby powder, tape, friction and shearing, hot water or constrictive clothing  -Relieve pressure over bony prominences using pillows  -Do not massage red bony areas    Outcome: Progressing  Goal: Incision(s), wounds(s) or drain site(s) healing without S/S of infection  Description: INTERVENTIONS  - Assess and document dressing, incision, wound bed, drain sites and surrounding tissue  - Provide patient and family education  - Perform skin care/dressing changes every day  Outcome: Progressing     Problem: HEMATOLOGIC - ADULT  Goal: Maintains hematologic stability  Description: INTERVENTIONS  - Assess for signs and symptoms of bleeding or hemorrhage  - Monitor labs  - Administer supportive blood products/factors as ordered and appropriate  Outcome: Progressing     Problem: MUSCULOSKELETAL - ADULT  Goal: Maintain or return mobility to safest level of function  Description: INTERVENTIONS:  - Assess patient's ability to carry out ADLs; assess patient's baseline for ADL function and identify physical deficits which impact ability to perform ADLs (bathing, care of mouth/teeth, toileting, grooming, dressing, etc )  - Assess/evaluate cause of self-care deficits   - Assess range of motion  - Assess patient's mobility  - Assess patient's need for assistive devices and provide as appropriate  - Encourage maximum independence but intervene and supervise when necessary  - Involve family in performance of ADLs  - Assess for home care needs following discharge   - Consider OT consult to assist with ADL evaluation and planning for discharge  - Provide patient education as appropriate  Outcome: Progressing     Problem: COPING  Goal: Pt/Family able to verbalize concerns and demonstrate effective coping strategies  Description: INTERVENTIONS:  - Assist patient/family to identify coping skills, available support systems and cultural and spiritual values  - Provide emotional support, including active listening and acknowledgement of concerns of patient and caregivers  - Reduce environmental stimuli, as able  - Provide patient education  - Assess for spiritual pain/suffering and initiate spiritual care, including notification of Pastoral Care or [place] based community as needed  - Assess effectiveness of coping strategies  Outcome: Progressing  Goal: Will report anxiety at manageable levels  Description: INTERVENTIONS:  - Administer medication as ordered  - Teach and encourage coping skills  - Provide emotional support  - Assess patient/family for anxiety and ability to cope  Outcome: Progressing

## 2022-08-30 NOTE — ASSESSMENT & PLAN NOTE
· Arrived to [facility] on 3L supplemental O2 sating 100%  · PRN bronchodilators  · Q1hr I/S use  · 1PPD smoker  · Nicotine patch  · Goal SpO2 >88%, currently with recorded desaturations to <80% while ambulating  · Pulmonology consulted -- will need home oxygen evaluation and nebulizer machine for home  · Ambulatory pulse ox in preparation for home oxygen

## 2022-08-30 NOTE — ASSESSMENT & PLAN NOTE
· Arrived to [facility] on 3L supplemental O2 sating 100%  · PRN bronchodilators  · Q1hr I/S use  · 1PPD smoker  · Nicotine patch  · Goal SpO2 >88%, currently with recorded desaturations to <80% while ambulating  · Pulmonology consulted -- home with supplemental oxygen, outpatient follow up with PFTs, prednisone 40mg QD x7 days

## 2022-08-31 NOTE — UTILIZATION REVIEW
Notification of Discharge   This is a Notification of Discharge from our facility [address]  Please be advised that this patient has been discharge from our facility  Below you will find the admission and discharge date and time including the patients disposition  UTILIZATION REVIEW CONTACT:  Katy Roblero  Utilization   Network Utilization Review Department  Phone: [phone] x carefully listen to the prompts  All voicemails are confidential   Email: [email or web address]     PHYSICIAN ADVISORY SERVICES:  FOR [id number] REVIEW - MEDICAL NECESSITY DENIAL  Phone: [phone]  Fax: [phone]  Email: [email or web address]     PRESENTATION DATE: 8/21/2022  9:49 AM  OBERVATION ADMISSION DATE:   INPATIENT ADMISSION DATE: 8/21/22 10:38 AM   DISCHARGE DATE: 8/30/2022  5:35 PM  DISPOSITION: Home with [facility] with [address] [place] [address] Road INFORMATION:  Send all requests for admission clinical reviews, approved or denied determinations and any other requests to dedicated fax number below belonging to the campus where the patient is receiving treatment   List of dedicated fax numbers:  [address] DENIALS (Administrative/Medical Necessity) [phone]   [address] (Maternity/NICU/Pediatrics) [phone]   [facility] [phone]   [address] [phone]   [address] [place] [address] [phone]   2000 [facility] [address] [place] [address],[address] [phone]   [facility]  [phone]   [address] [facility] [address]  [address] [facility] [address] [facility] [phone]

## 2022-09-01 ENCOUNTER — OFFICE VISIT (OUTPATIENT)
Dept: SURGERY | Facility: CLINIC | Age: 62
End: 2022-09-01

## 2022-09-01 VITALS
OXYGEN SATURATION: 97 % | HEIGHT: 61 IN | BODY MASS INDEX: 19.11 KG/M2 | WEIGHT: 101.2 LBS | RESPIRATION RATE: 12 BRPM | SYSTOLIC BLOOD PRESSURE: 132 MMHG | HEART RATE: 84 BPM | TEMPERATURE: 97.7 F | DIASTOLIC BLOOD PRESSURE: 76 MMHG

## 2022-09-01 DIAGNOSIS — S01.01XA SCALP LACERATION, INITIAL ENCOUNTER: Primary | ICD-10-CM

## 2022-09-01 PROCEDURE — 99024 POSTOP FOLLOW-UP VISIT: CPT | Performed by: SURGERY

## 2022-09-01 NOTE — PROGRESS NOTES
Office Visit - Trauma  Damien Christie MRN: [id number]  Encounter: [id number]    Assessment and Plan  Problem List Items Addressed This Visit        Other    Scalp laceration, initial encounter - Primary     - sutures removed at visit  - no further workup at this time  - patient tolerated well with no immediate complications  - wound is well approximated             Disposition:  No further workup at this time  Patient tolerates staple removal well  Outpatient follow-up as needed only    Chief Complaint:  Damien Christie is a 60 y o  female who presents for Follow-up (F/u fall  Suture removal )    Subjective  Patient here for staple removal     Past Medical History:   Diagnosis Date    Basedow's disease     Disease of thyroid gland     DVT (deep venous thrombosis) (HCC)     MGUS (monoclonal gammopathy of unknown significance)     Pulmonary emphysema (HCC)        Past Surgical History:   Procedure Laterality Date    CLOSED REDUCTION NASAL FRACTURE Bilateral 8/22/2022    Procedure: CLOSED REDUCTION NASAL FRACTURE;  Surgeon: Zachary Shipman DMD;  [facility];  Service: Maxillofacial       History reviewed  No pertinent family history      Social History     Tobacco Use    Smoking status: Current Every Day Smoker     Packs/day: 1 00     Years: 45 00     Pack years: 45 00     Types: Cigarettes    Smokeless tobacco: Never Used   Substance Use Topics    Alcohol use: Never    Drug use: Never        Medications  Current Outpatient Medications on File Prior to Visit   Medication Sig Dispense Refill    apixaban (ELIQUIS) 5 mg Take 5 mg by mouth 2 (two) times a day      aspirin 81 mg chewable tablet Chew 81 mg daily      carbidopa-levodopa-entacapone (STALEVO) -200 MG per tablet Take 1 tablet by mouth 4 (four) times a day      gabapentin (NEURONTIN) 300 mg capsule Take 300 mg by mouth 3 (three) times a day      lidocaine (LIDODERM) 5 % Apply 1 patch topically daily for 5 days Remove & Discard patch within 12 hours or as directed by MD Doan patch 0    nicotine (NICODERM CQ) 14 mg/24hr TD 24 hr patch Place 1 patch on the skin daily 90 each 0    PARoxetine (PAXIL) 20 mg tablet Take 20 mg by mouth daily      predniSONE 20 mg tablet Take 2 tablets (40 mg total) by mouth daily for 7 days 14 tablet 0    propranolol (INNOPRAN XL) 120 MG 24 hr capsule Take 160 mg by mouth daily at bedtime      senna-docusate sodium (SENOKOT S) 8 6-50 mg per tablet Take 2 tablets by mouth 2 (two) times a day 120 tablet 0     No current facility-administered medications on file prior to visit  Allergies  No Known Allergies    Review of Systems   Skin: Positive for wound         Objective  Vitals:    09/01/22 1445   BP: 132/76   Pulse: 84   Resp: 12   Temp: 97 7 °F (36 5 °C)   SpO2: 97%       Physical Exam  Skin:     Comments: Scalp laceration is clean, dry, intact

## 2022-09-01 NOTE — ASSESSMENT & PLAN NOTE
- sutures removed at visit  - no further workup at this time  - patient tolerated well with no immediate complications  - wound is well approximated

## 2022-09-29 ENCOUNTER — HOSPITAL ENCOUNTER (OUTPATIENT)
Dept: RADIOLOGY | Facility: HOSPITAL | Age: 62
End: 2022-09-29
Payer: COMMERCIAL

## 2022-09-29 DIAGNOSIS — S12.101A CLOSED NONDISPLACED FRACTURE OF SECOND CERVICAL VERTEBRA, UNSPECIFIED FRACTURE MORPHOLOGY, INITIAL ENCOUNTER (HCC): ICD-10-CM

## 2022-09-29 PROCEDURE — 72040 X-RAY EXAM NECK SPINE 2-3 VW: CPT

## 2022-10-04 ENCOUNTER — OFFICE VISIT (OUTPATIENT)
Dept: NEUROSURGERY | Facility: CLINIC | Age: 62
End: 2022-10-04
Payer: COMMERCIAL

## 2022-10-04 VITALS
BODY MASS INDEX: 19.07 KG/M2 | HEIGHT: 61 IN | WEIGHT: 101 LBS | TEMPERATURE: 98 F | HEART RATE: 86 BPM | DIASTOLIC BLOOD PRESSURE: 72 MMHG | SYSTOLIC BLOOD PRESSURE: 112 MMHG

## 2022-10-04 DIAGNOSIS — S12.200A C3 CERVICAL FRACTURE (HCC): ICD-10-CM

## 2022-10-04 DIAGNOSIS — M81.0 OSTEOPOROSIS: ICD-10-CM

## 2022-10-04 DIAGNOSIS — S12.101A CLOSED NONDISPLACED FRACTURE OF SECOND CERVICAL VERTEBRA, UNSPECIFIED FRACTURE MORPHOLOGY, INITIAL ENCOUNTER (HCC): Primary | ICD-10-CM

## 2022-10-04 PROCEDURE — 99213 OFFICE O/P EST LOW 20 MIN: CPT | Performed by: PHYSICIAN ASSISTANT

## 2022-10-04 NOTE — ASSESSMENT & PLAN NOTE
· Pt presents to neurosurgery office for approximately 6 week follow-up for Oblique fracture of the anterior inferior aspect of C2 as well as oblique fracture of the left transverse process of C3 Status post fall on 8/12/22 while in bathroom, patient falling frequently lately  History of Parkinson's  · History of previous anterior cervical diskectomy and fixation fusion at [facility]  Plan     · Imaging reviewed personally  Final results below discussed with the patient  · X-rays cervical spine 09/29/2022: Stable alignment of C2 and C3 fractures better visualized by prior CT study  Persistent ACDF C5-C7, unchanged    Plan  · Pain control with over-the-counter and/or prescribed medications as needed  · Recommend Cervical brace to be worn at all times, for showers switch to [facility] [place] collar  · Patient to avoid bending, twisting or turning the neck  No lifting more than 10 lb  No driving while being treated in a brace  · At this time, no neurosurgical intervention is anticipated  · Given the patient has no neck pain at this time and no new neurological symptoms, patient to follow-up in 2-3 weeks with flexion-extension x-rays of the cervical spine  · Patient to return sooner with any worsening neck pain, new numbness, tingling or weakness in extremities    · Patient verbalized understanding and was in agreement with the plan

## 2022-10-04 NOTE — PROGRESS NOTES
Neurosurgery Office Note  Nahid Palm 60 y o  female MRN: [id number]      Assessment/Plan     C2 cervical fracture ([facility] )  · Pt presents to neurosurgery office for approximately 6 week follow-up for Oblique fracture of the anterior inferior aspect of C2 as well as oblique fracture of the left transverse process of C3 Status post fall on 8/12/22 while in bathroom, patient falling frequently lately  History of Parkinson's  · History of previous anterior cervical diskectomy and fixation fusion at [facility]  Plan     · Imaging reviewed personally  Final results below discussed with the patient  · X-rays cervical spine 09/29/2022: Stable alignment of C2 and C3 fractures better visualized by prior CT study  Persistent ACDF C5-C7, unchanged    Plan  · Pain control with over-the-counter and/or prescribed medications as needed  · Recommend Cervical brace to be worn at all times, for showers switch to [place] collar  · Patient to avoid bending, twisting or turning the neck  No lifting more than 10 lb  No driving while being treated in a brace  · At this time, no neurosurgical intervention is anticipated  · Given the patient has no neck pain at this time and no new neurological symptoms, patient to follow-up in 2-3 weeks with flexion-extension x-rays of the cervical spine  · Patient to return sooner with any worsening neck pain, new numbness, tingling or weakness in extremities  · Patient verbalized understanding and was in agreement with the plan           Diagnoses and all orders for this visit:    Closed nondisplaced fracture of second cervical vertebra, unspecified fracture morphology, initial encounter ([facility] )  -     DXA bone density spine hip and pelvis; Future  -     XR spine cervical complete 6+ vw flex/ext/obl; Future    C3 cervical fracture (HCC)  -     XR spine cervical complete 6+ vw flex/ext/obl;  Future    Osteoporosis        I spent 30 minutes with the patient today in which >50% of the time was spent counseling/coordination of care regarding diagnosis, imaging review, symptoms and treatment plan  CHIEF COMPLAINT    Chief Complaint   Patient presents with    Follow-up     From hospital admission    Spine Injury     C2 cervical fracture, C3 cervical fracture        HISTORY    History of Present Illness     60y o  year old female     Patient is a 13-year-old female past medical history of Parkinson's disease who presents to neurosurgery office for approximately 6 week follow-up for C2 and C3 fractures s/p fall on 8/21/22  Patient denies neck pain today  She denies radicular pain in bilateral upper extremities  Patient denies any new numbness, tingling, weakness in bilateral arms or legs  Patient reports she has some gait instability due to Parkinson's disease  She denies any bowel or bladder incontinence  Patient reports he has been compliant with use of her cervical brace though she reports that it is uncomfortable and she wanted to find out the length of time she would need to have it on  Pt with reports tremors from Parkinson's  Patient and  who accompanied her to this visit report that patient is not responding well to the Parkinson's medications  She also reports that she was evaluated for deep brain stimulator, but was told that they type of Parkinson's disease that she has, she would not be a good candidate for DBS since she is not responding to the Parkinson's medications  REVIEW OF SYSTEMS    Review of Systems   Constitutional: Negative  HENT: Negative  Eyes: Negative  Respiratory: Negative  Cardiovascular: Negative  Gastrointestinal: Negative  Endocrine: Negative  Genitourinary: Negative  Musculoskeletal: Negative  Skin: Negative  Allergic/Immunologic: Negative  Neurological: Positive for tremors  Tremors secondary to Parkinson's disease  Hematological: Negative  Psychiatric/Behavioral: Negative      All other systems reviewed and are negative  Meds/Allergies     Current Outpatient Medications   Medication Sig Dispense Refill    apixaban (ELIQUIS) 5 mg Take 5 mg by mouth 2 (two) times a day      aspirin 81 mg chewable tablet Chew 81 mg daily      carbidopa-levodopa-entacapone (STALEVO) -200 MG per tablet Take 1 tablet by mouth 4 (four) times a day      gabapentin (NEURONTIN) 300 mg capsule Take 300 mg by mouth 3 (three) times a day      nicotine (NICODERM CQ) 14 mg/24hr TD 24 hr patch Place 1 patch on the skin daily 90 each 0    PARoxetine (PAXIL) 20 mg tablet Take 20 mg by mouth daily      propranolol (INNOPRAN XL) 120 MG 24 hr capsule Take 160 mg by mouth daily at bedtime       No current facility-administered medications for this visit  No Known Allergies    PAST HISTORY    Past Medical History:   Diagnosis Date    Basedow's disease     Disease of thyroid gland     DVT (deep venous thrombosis) (HCC)     MGUS (monoclonal gammopathy of unknown significance)     Pulmonary emphysema (HCC)        Past Surgical History:   Procedure Laterality Date    CLOSED REDUCTION NASAL FRACTURE Bilateral 8/22/2022    Procedure: CLOSED REDUCTION NASAL FRACTURE;  Surgeon: Esa Preciado DMD;  [facility];  Service: Maxillofacial       Social History     Tobacco Use    Smoking status: Current Every Day Smoker     Packs/day: 1 00     Years: 45 00     Pack years: 45 00     Types: Cigarettes    Smokeless tobacco: Never Used   Substance Use Topics    Alcohol use: Never    Drug use: Never       History reviewed  No pertinent family history  Above history personally reviewed  EXAM    Vitals:Blood pressure 112/72, pulse 86, temperature 98 °F (36 7 °C), temperature source Temporal, height 5' 1" (1 549 m), weight 45 8 kg (101 lb)  ,Body mass index is 19 08 kg/m²  Physical Exam  Constitutional:       General: She is not in acute distress  Appearance: She is well-developed     HENT: Head: Normocephalic  Eyes:      Pupils: Pupils are equal, round, and reactive to light  Neck:      Trachea: No tracheal deviation  Comments: Cervical brace in place  Cardiovascular:      Rate and Rhythm: Normal rate  Pulmonary:      Effort: Pulmonary effort is normal    Abdominal:      Palpations: Abdomen is soft  Tenderness: There is no abdominal tenderness  There is no guarding  Musculoskeletal:      Cervical back: Neck supple  Skin:     General: Skin is warm and dry  Coloration: Skin is not pale  Findings: No rash  Neurological:      Mental Status: She is alert and oriented to person, place, and time  Comments: GCS 15, Awake, Alert, Oriented x 3    Motor: MANCERA, strength 5/5 throughout    Sensation:  intact to LT X 4     Reflexes: 2+ and symmetric, no rhoades's or clonus     Coordination: no drift bilateral upper extremities, finger to nose normal bilaterally  Tremors 2/2 to parkinson's disease       Psychiatric:         Behavior: Behavior normal          Neurologic Exam     Mental Status   Oriented to person, place, and time  Cranial Nerves     CN III, IV, VI   Pupils are equal, round, and reactive to light  MEDICAL DECISION MAKING    Imaging Studies:     XR spine cervical 2 or 3 vw injury    Result Date: 10/3/2022  Narrative: CERVICAL SPINE INDICATION:   S12 101A: Unspecified nondisplaced fracture of second cervical vertebra, initial encounter for closed fracture  COMPARISON:  8/21/2022 VIEWS:  XR SPINE CERVICAL 2 OR 3 VW INJURY Images: 3 FINDINGS: Stable alignment of C2 and C3 fractures which were better visualized by CT 8/21/2022 Persistent ACDF C5-6 and C6-7, unchanged Anatomic alignment  Multilevel degenerative spondylotic changes again noted Normal prevertebral soft tissues  Clear lung apices       Impression: Stable alignment of C2 and C3 fractures better visualized by prior CT study Persistent ACDF C5-C7, unchanged Workstation performed: [id number] I have personally reviewed pertinent reports

## 2022-10-04 NOTE — PROGRESS NOTES
Neurosurgery Office Note  Lani Tsang 60 y o  female MRN: [id number]      Assessment/Plan     No problem-specific Assessment & Plan notes found for this encounter  {Assess/PlanSmarLourdes Specialty Hospitals:32552}      I spent *** minutes with the patient today in which >50% of the time was spent counseling/coordination of care regarding diagnosis, imaging review, symptoms and treatment plan  CHIEF COMPLAINT    No chief complaint on file  HISTORY    History of Present Illness     60y o  year old female     HPI    See Discussion    REVIEW OF SYSTEMS    Review of Systems      Meds/Allergies     Current Outpatient Medications   Medication Sig Dispense Refill    apixaban (ELIQUIS) 5 mg Take 5 mg by mouth 2 (two) times a day      aspirin 81 mg chewable tablet Chew 81 mg daily      carbidopa-levodopa-entacapone (STALEVO) -200 MG per tablet Take 1 tablet by mouth 4 (four) times a day      gabapentin (NEURONTIN) 300 mg capsule Take 300 mg by mouth 3 (three) times a day      lidocaine (LIDODERM) 5 % Apply 1 patch topically daily for 5 days Remove & Discard patch within 12 hours or as directed by MD 5 patch 0    nicotine (NICODERM CQ) 14 mg/24hr TD 24 hr patch Place 1 patch on the skin daily 90 each 0    PARoxetine (PAXIL) 20 mg tablet Take 20 mg by mouth daily      propranolol (INNOPRAN XL) 120 MG 24 hr capsule Take 160 mg by mouth daily at bedtime      senna-docusate sodium (SENOKOT S) 8 6-50 mg per tablet Take 2 tablets by mouth 2 (two) times a day 120 tablet 0     No current facility-administered medications for this visit         No Known Allergies    PAST HISTORY    Past Medical History:   Diagnosis Date    Basedow's disease     Disease of thyroid gland     DVT (deep venous thrombosis) (HCC)     MGUS (monoclonal gammopathy of unknown significance)     Pulmonary emphysema (HCC)        Past Surgical History:   Procedure Laterality Date    CLOSED REDUCTION NASAL FRACTURE Bilateral 8/22/2022 Procedure: CLOSED REDUCTION NASAL FRACTURE;  Surgeon: Emory Corey DMD;  [facility];  Service: Maxillofacial       Social History     Tobacco Use    Smoking status: Current Every Day Smoker     Packs/day: 1 00     Years: 45 00     Pack years: 45 00     Types: Cigarettes    Smokeless tobacco: Never Used   Substance Use Topics    Alcohol use: Never    Drug use: Never       No family history on file  Above history personally reviewed  EXAM    Vitals: There were no vitals taken for this visit  ,There is no height or weight on file to calculate BMI  Physical Exam    Neurologic Exam      MEDICAL DECISION MAKING    Imaging Studies:     XR spine cervical 2 or 3 vw injury    Result Date: 10/3/2022  Narrative: CERVICAL SPINE INDICATION:   S12 101A: Unspecified nondisplaced fracture of second cervical vertebra, initial encounter for closed fracture  COMPARISON:  8/21/2022 VIEWS:  XR SPINE CERVICAL 2 OR 3 VW INJURY Images: 3 FINDINGS: Stable alignment of C2 and C3 fractures which were better visualized by CT 8/21/2022 Persistent ACDF C5-6 and C6-7, unchanged Anatomic alignment  Multilevel degenerative spondylotic changes again noted Normal prevertebral soft tissues  Clear lung apices       Impression: Stable alignment of C2 and C3 fractures better visualized by prior CT study Persistent ACDF C5-C7, unchanged Workstation performed: [id number]       {Results Review Statement:37307}

## 2022-10-07 ENCOUNTER — HOSPITAL ENCOUNTER (OUTPATIENT)
Dept: RADIOLOGY | Facility: CLINIC | Age: 62
End: 2022-10-07
Payer: COMMERCIAL

## 2022-10-07 DIAGNOSIS — S12.101A CLOSED NONDISPLACED FRACTURE OF SECOND CERVICAL VERTEBRA, UNSPECIFIED FRACTURE MORPHOLOGY, INITIAL ENCOUNTER (HCC): ICD-10-CM

## 2022-10-07 PROCEDURE — 77080 DXA BONE DENSITY AXIAL: CPT

## 2022-10-11 ENCOUNTER — TELEPHONE (OUTPATIENT)
Dept: PULMONOLOGY | Facility: CLINIC | Age: 62
End: 2022-10-11

## 2022-10-19 NOTE — TELEPHONE ENCOUNTER
Called and left message for pt to contact office and let us know who her DME company for oxygen is 
Received a call re: Laura Kathy passed away last night, cancelled appt for tomorrow 
done

## 2022-10-21 PROBLEM — S01.01XA SCALP LACERATION, INITIAL ENCOUNTER: Status: RESOLVED | Noted: 2022-08-21 | Resolved: 2022-10-21

## 2022-11-01 NOTE — CASE MANAGEMENT
Case Management Progress Note    Patient name Jason Costello  Location [address] 627/PPHP 326-22 MRN [id number]  : 1960 Date 2022       LOS (days): 9  Geometric Mean LOS (GMLOS) (days):   Days to GMLOS:        OBJECTIVE:        Current admission status: Inpatient  Preferred Pharmacy:   [address]  Phone: [phone] Fax: [address] [facility],  Jacquelyn Garcia [address] [place] [address]  Phone: [phone] Fax: [phone]    Primary Care Provider: No primary care provider on file      Primary Insurance: Megan Roche  Secondary Insurance:     PROGRESS NOTE:  Pt's AVS was faxed to [facility] HISTORY OF PRESENT ILLNESS     GUSTAVO Houston presents for complete physical exam.  The patient was last seen by me about 1 year ago.  He has no acute complaints or concerns otherwise today.    PAST MEDICAL, FAMILY AND SOCIAL HISTORY     Patient Active Problem List   Diagnosis   • Obesity, unspecified   • Unspecified transient cerebral ischemia-TIA involving right hemispheric distribution in 2005. Complete resolution   • Bilateral hearing loss-hearing aids in place   • Pure hypercholesterolemia       Current Outpatient Medications   Medication Sig Dispense Refill   • atorvastatin (LIPITOR) 10 MG tablet Take 1 tablet by mouth daily. 90 tablet 3   • clopidogrel (PLAVIX) 75 MG tablet Take 1 tablet by mouth daily. 90 tablet 3   • naproxen sodium (ALEVE) 220 MG tablet Take 220 mg by mouth as needed.       No current facility-administered medications for this visit.       I have reviewed the patient's medications and allergies, past medical, surgical, social and family history, updating these as appropriate.  See histories section of the electronic medical record for a display of this information.    REVIEW OF SYSTEMS     Review of Systems   Constitutional: Negative for chills, diaphoresis and fever.   Respiratory: Negative for cough, shortness of breath and wheezing.    Cardiovascular: Negative for chest pain, palpitations and leg swelling.   Gastrointestinal: Negative for diarrhea, nausea and vomiting.       PHYSICAL EXAM     Visit Vitals  /74 (BP Location: LUE - Left upper extremity, Patient Position: Sitting, Cuff Size: Regular)   Pulse 80   Temp 97.8 °F (36.6 °C) (Oral)   Resp 18   Ht 5' 9\" (1.753 m)   Wt 96.7 kg (213 lb 3.2 oz)   BMI 31.48 kg/m²       Physical Exam  Constitutional:       General: He is not in acute distress.  HENT:      Head: Normocephalic and atraumatic.      Right Ear: Tympanic membrane and ear canal normal.      Left Ear: Tympanic membrane and ear canal normal.      Nose: No rhinorrhea.    Eyes:      Conjunctiva/sclera: Conjunctivae normal.   Neck:      Thyroid: No thyromegaly.   Cardiovascular:      Rate and Rhythm: Normal rate and regular rhythm.      Heart sounds: No murmur heard.  Pulmonary:      Effort: Pulmonary effort is normal.      Breath sounds: No wheezing, rhonchi or rales.   Abdominal:      General: Bowel sounds are normal.      Palpations: Abdomen is soft.      Tenderness: There is no abdominal tenderness.   Lymphadenopathy:      Head:      Right side of head: No submandibular, preauricular or posterior auricular adenopathy.      Left side of head: No submandibular, preauricular or posterior auricular adenopathy.      Cervical: No cervical adenopathy.      Upper Body:      Right upper body: No supraclavicular adenopathy.      Left upper body: No supraclavicular adenopathy.   Skin:     General: Skin is warm and dry.      Findings: No rash.   Neurological:      Mental Status: He is alert.   Psychiatric:         Behavior: Behavior normal.          RESULTS      Lab Services on 10/11/2022   Component Date Value Ref Range Status   • Fasting Status 10/11/2022 15  0 - 999 Hours Final   • Sodium 10/11/2022 139  135 - 145 mmol/L Final   • Potassium 10/11/2022 4.1  3.4 - 5.1 mmol/L Final   • Chloride 10/11/2022 102  97 - 110 mmol/L Final   • Carbon Dioxide 10/11/2022 28  21 - 32 mmol/L Final   • Anion Gap 10/11/2022 13  7 - 19 mmol/L Final   • Glucose 10/11/2022 103 (A) 70 - 99 mg/dL Final   • BUN 10/11/2022 13  6 - 20 mg/dL Final   • Creatinine 10/11/2022 1.11  0.67 - 1.17 mg/dL Final   • Glomerular Filtration Rate 10/11/2022 73  >=60 Final   • BUN/ Creatinine Ratio 10/11/2022 12  7 - 25 Final   • Calcium 10/11/2022 9.3  8.4 - 10.2 mg/dL Final   • Bilirubin, Total 10/11/2022 0.5  0.2 - 1.0 mg/dL Final   • GOT/AST 10/11/2022 22  <=37 Units/L Final   • GPT/ALT 10/11/2022 42  <64 Units/L Final   • Alkaline Phosphatase 10/11/2022 76  45 - 117 Units/L Final   • Albumin 10/11/2022 4.1  3.6 - 5.1 g/dL  Final   • Protein, Total 10/11/2022 7.1  6.4 - 8.2 g/dL Final   • Globulin 10/11/2022 3.0  2.0 - 4.0 g/dL Final   • A/G Ratio 10/11/2022 1.4  1.0 - 2.4 Final   • Fasting Status 10/11/2022 15  0 - 999 Hours Final   • Cholesterol 10/11/2022 149  <=199 mg/dL Final   • Triglycerides 10/11/2022 90  <=149 mg/dL Final   • HDL 10/11/2022 62  >=40 mg/dL Final   • LDL 10/11/2022 69  <=129 mg/dL Final   • Non-HDL Cholesterol 10/11/2022 87  mg/dL Final   • Cholesterol/ HDL Ratio 10/11/2022 2.4  <=4.4 Final         ASSESSMENT/PLAN     ASSESSMENT:  1. Medicare annual wellness visit, subsequent    2. Annual physical exam    3. Pure hypercholesterolemia        PLAN:  Orders Placed This Encounter   • Comprehensive Metabolic Panel   • Lipid Panel With Reflex   • atorvastatin (LIPITOR) 10 MG tablet   • clopidogrel (PLAVIX) 75 MG tablet     In addition to being tobacco free and maintaining a healthy weight, I have recommended 30 minutes of cardiovascular exercise 3-5 times a week and attention to a balanced diet with controlled portion sizes.    Cholesterol Management:     Cholesterol (mg/dL)   Date Value   10/11/2022 149     HDL (mg/dL)   Date Value   10/11/2022 62     Overall course of cholesterol management is unchanged and controlled.  Present treatment plan reviewed with patient and no changes made at this time.        Return in about 1 year (around 11/1/2023) for Medicare Wellness Visit, with FASTING bloodwork prior to appointment.    He will call or return to the office for any persistent, worsening, or concerning symptoms.  For any emergencies, he will present to the emergency room or call 911.    The patient understands, agrees, and will comply with today's plan.

## 2022-11-10 LAB
DME PARACHUTE DELIVERY DATE ACTUAL: NORMAL
DME PARACHUTE DELIVERY DATE REQUESTED: NORMAL
DME PARACHUTE ITEM DESCRIPTION: NORMAL
DME PARACHUTE ORDER STATUS: NORMAL
DME PARACHUTE SUPPLIER NAME: NORMAL
DME PARACHUTE SUPPLIER PHONE: NORMAL

## 2023-07-19 NOTE — CASE MANAGEMENT
07/19/2023 11:07 AM EDT by RADHA Baez Cara R (Self) [phone] (Mobile) Remove   called back and spoke to april and april gave her the message and was reported back to me she will work on getting sober again      Case Management Discharge Planning Note    Patient name Anoop Mcfadden  [facility] [id number]  : 1960 Date 2022       Current Admission Date: 2022  Current Admission Diagnosis:C2 cervical fracture [facility])   Patient Active Problem List    Diagnosis Date Noted    COVID-19 2022    Acute blood loss anemia 2022    Parkinson disease ([facility] ) 2022    [facility]    [facility] 20[facility]    Nasal fracture 2022    Unspecified injury of left vertebral artery, initial encounter 2022    Scalp laceration, initial encounter 2022    Fall 2022    DVT (deep venous thrombosis) ([facility] ) 2022    Pulmonary emphysema (HCC)     MGUS (monoclonal gammopathy of unknown significance)       LOS (days): 9  Geometric Mean LOS (GMLOS) (days):   Days to GMLOS:     OBJECTIVE:  Risk of Unplanned Readmission Score: 11 05         Current admission status: Inpatient   Preferred Pharmacy:   [facility] [address] [place] [address]  Phone: [phone] Fax: [phone]    Primary Care Provider: No primary care provider on file      Primary Insurance: [employer]  Secondary Insurance:     DISCHARGE DETAILS:    [facility]         Is the patient interested in [facility] at discharge?: Yes  [address] requested[de-identified] Physical Therapy, Occupational Therapy, [address] Name[de-identified] [address] [facility] Provider[de-identified] PCP  Home Health Services Needed[de-identified] Strengthening/Theraputic Exercises to Improve Function, Gait/ADL Training, Evaluate Functional Status and Safety  Homebound Criteria Met[de-identified] Uses an Assist Device (i e  cane, walker, etc), Requires the Assistance of Another Person for Safe Ambulation or to Leave the Home  Supporting Clincal Findings[de-identified] Limited Endurance, Fatigues Easliy in [employer]    DME Referral Provided  Referral made for DME?: Yes  DME referral completed for the following items[de-identified] Aleksander Mcmahon  DME Supplier Name[de-identified] [employer]    Other Referral/Resources/Interventions Provided:  Interventions: HHC, DME    Treatment Team Recommendation: Short Term Rehab, SNF  Discharge Destination Plan[de-identified] Home with [address] and  continue to want a home d/c  CM informed pt's  that the pt still needs the Home O2 evaluation completed prior to d/c
